# Patient Record
Sex: FEMALE | Race: BLACK OR AFRICAN AMERICAN | Employment: OTHER | URBAN - METROPOLITAN AREA
[De-identification: names, ages, dates, MRNs, and addresses within clinical notes are randomized per-mention and may not be internally consistent; named-entity substitution may affect disease eponyms.]

---

## 2019-08-17 ENCOUNTER — APPOINTMENT (OUTPATIENT)
Dept: GENERAL RADIOLOGY | Age: 84
End: 2019-08-17
Attending: EMERGENCY MEDICINE
Payer: MEDICARE

## 2019-08-17 ENCOUNTER — APPOINTMENT (OUTPATIENT)
Dept: CT IMAGING | Age: 84
End: 2019-08-17
Attending: EMERGENCY MEDICINE
Payer: MEDICARE

## 2019-08-17 ENCOUNTER — HOSPITAL ENCOUNTER (EMERGENCY)
Age: 84
Discharge: HOME | End: 2019-08-17
Attending: EMERGENCY MEDICINE
Payer: MEDICARE

## 2019-08-17 VITALS
SYSTOLIC BLOOD PRESSURE: 135 MMHG | DIASTOLIC BLOOD PRESSURE: 56 MMHG | OXYGEN SATURATION: 98 % | HEART RATE: 78 BPM | HEIGHT: 61 IN | TEMPERATURE: 97.9 F | WEIGHT: 145 LBS | BODY MASS INDEX: 27.38 KG/M2 | RESPIRATION RATE: 18 BRPM

## 2019-08-17 DIAGNOSIS — R56.9 SEIZURE (HCC): Primary | ICD-10-CM

## 2019-08-17 LAB
ALBUMIN SERPL-MCNC: 3.8 G/DL (ref 3.5–5)
ALBUMIN/GLOB SERPL: 1.1 {RATIO} (ref 1.1–2.2)
ALP SERPL-CCNC: 68 U/L (ref 45–117)
ALT SERPL-CCNC: 20 U/L (ref 12–78)
ANION GAP SERPL CALC-SCNC: 7 MMOL/L (ref 5–15)
APPEARANCE UR: CLEAR
AST SERPL-CCNC: 15 U/L (ref 15–37)
BACTERIA URNS QL MICRO: NEGATIVE /HPF
BASOPHILS # BLD: 0.1 K/UL (ref 0–0.1)
BASOPHILS NFR BLD: 1 % (ref 0–1)
BILIRUB SERPL-MCNC: 0.3 MG/DL (ref 0.2–1)
BILIRUB UR QL: NEGATIVE
BUN SERPL-MCNC: 18 MG/DL (ref 6–20)
BUN/CREAT SERPL: 13 (ref 12–20)
CALCIUM SERPL-MCNC: 9.5 MG/DL (ref 8.5–10.1)
CHLORIDE SERPL-SCNC: 112 MMOL/L (ref 97–108)
CO2 SERPL-SCNC: 23 MMOL/L (ref 21–32)
COLOR UR: ABNORMAL
CREAT SERPL-MCNC: 1.38 MG/DL (ref 0.55–1.02)
DIFFERENTIAL METHOD BLD: ABNORMAL
EOSINOPHIL # BLD: 0.1 K/UL (ref 0–0.4)
EOSINOPHIL NFR BLD: 2 % (ref 0–7)
EPITH CASTS URNS QL MICRO: ABNORMAL /LPF
ERYTHROCYTE [DISTWIDTH] IN BLOOD BY AUTOMATED COUNT: 14.7 % (ref 11.5–14.5)
GLOBULIN SER CALC-MCNC: 3.6 G/DL (ref 2–4)
GLUCOSE SERPL-MCNC: 98 MG/DL (ref 65–100)
GLUCOSE UR STRIP.AUTO-MCNC: NEGATIVE MG/DL
HCT VFR BLD AUTO: 40.2 % (ref 35–47)
HGB BLD-MCNC: 12.8 G/DL (ref 11.5–16)
HGB UR QL STRIP: NEGATIVE
HYALINE CASTS URNS QL MICRO: ABNORMAL /LPF (ref 0–5)
IMM GRANULOCYTES # BLD AUTO: 0 K/UL (ref 0–0.04)
IMM GRANULOCYTES NFR BLD AUTO: 0 % (ref 0–0.5)
KETONES UR QL STRIP.AUTO: NEGATIVE MG/DL
LEUKOCYTE ESTERASE UR QL STRIP.AUTO: ABNORMAL
LYMPHOCYTES # BLD: 1.6 K/UL (ref 0.8–3.5)
LYMPHOCYTES NFR BLD: 29 % (ref 12–49)
MCH RBC QN AUTO: 26 PG (ref 26–34)
MCHC RBC AUTO-ENTMCNC: 31.8 G/DL (ref 30–36.5)
MCV RBC AUTO: 81.5 FL (ref 80–99)
MONOCYTES # BLD: 0.7 K/UL (ref 0–1)
MONOCYTES NFR BLD: 13 % (ref 5–13)
NEUTS SEG # BLD: 3.1 K/UL (ref 1.8–8)
NEUTS SEG NFR BLD: 55 % (ref 32–75)
NITRITE UR QL STRIP.AUTO: NEGATIVE
NRBC # BLD: 0 K/UL (ref 0–0.01)
NRBC BLD-RTO: 0 PER 100 WBC
PH UR STRIP: 5.5 [PH] (ref 5–8)
PLATELET # BLD AUTO: 237 K/UL (ref 150–400)
PMV BLD AUTO: 10.8 FL (ref 8.9–12.9)
POTASSIUM SERPL-SCNC: 3.7 MMOL/L (ref 3.5–5.1)
PROT SERPL-MCNC: 7.4 G/DL (ref 6.4–8.2)
PROT UR STRIP-MCNC: NEGATIVE MG/DL
RBC # BLD AUTO: 4.93 M/UL (ref 3.8–5.2)
RBC #/AREA URNS HPF: ABNORMAL /HPF (ref 0–5)
SODIUM SERPL-SCNC: 142 MMOL/L (ref 136–145)
SP GR UR REFRACTOMETRY: 1.01 (ref 1–1.03)
UROBILINOGEN UR QL STRIP.AUTO: 0.2 EU/DL (ref 0.2–1)
WBC # BLD AUTO: 5.6 K/UL (ref 3.6–11)
WBC URNS QL MICRO: ABNORMAL /HPF (ref 0–4)

## 2019-08-17 PROCEDURE — 99285 EMERGENCY DEPT VISIT HI MDM: CPT

## 2019-08-17 PROCEDURE — 80053 COMPREHEN METABOLIC PANEL: CPT

## 2019-08-17 PROCEDURE — 81001 URINALYSIS AUTO W/SCOPE: CPT

## 2019-08-17 PROCEDURE — 71046 X-RAY EXAM CHEST 2 VIEWS: CPT

## 2019-08-17 PROCEDURE — 85025 COMPLETE CBC W/AUTO DIFF WBC: CPT

## 2019-08-17 PROCEDURE — 36415 COLL VENOUS BLD VENIPUNCTURE: CPT

## 2019-08-17 PROCEDURE — 70450 CT HEAD/BRAIN W/O DYE: CPT

## 2019-08-17 PROCEDURE — 93005 ELECTROCARDIOGRAM TRACING: CPT

## 2019-08-17 NOTE — ED NOTES
Care assumed of patient @ this time & intro with rounding done, with report from [facility], RN_________________. Patient resting quietly on stretcher without verbal complaints.

## 2019-08-17 NOTE — ED NOTES
Patient states she was at home on the computer playing a game where she got really dizzy, her son states her hand began shaking and her eyes rolled back in her head. Patient reports she has no history of seizure and that she does not remember the blacking out. She was never post ictal per EMS and remained A&O x4. Patient is in no distress at this time and is resting comfortably.

## 2019-08-17 NOTE — ED NOTES
Bedside shift change report given to Reyna Barnes RN (oncoming nurse) by Ariana Lynn RN (offgoing nurse). Report included the following information SBAR, Kardex and Recent Results.

## 2019-08-17 NOTE — ED PROVIDER NOTES
EMERGENCY DEPARTMENT HISTORY AND PHYSICAL EXAM      Date: 8/17/2019  Patient Name: Barry Soria    History of Presenting Illness     Chief Complaint   Patient presents with    Seizure       History Provided By: Patient and Patient's Son    HPI: Barry Soria, 80 y.o. female with PMHx significant for CAD and hypertension, presents by POV to the ED with cc of seizure-like activity. Symptoms occurred earlier today. Patient was at home playing a computer game, her son came into the room after she said she did not feel good and found her to be unresponsive with bilateral hand shaking. She was looking up to the ceiling, her eyes were tremulous and shaking back and forth. This occurred for approximately 5 minutes during which time she was unresponsive. No urinary incontinence but she did have some tongue biting. She had a 10-minute postictal period. No prior seizure activity. She does not drink alcohol. She only takes blood pressure medication. She denies any recent fevers, chills, illness. There are no other complaints, changes, or physical findings at this time. PCP: Maria Teresa Dan MD    No current facility-administered medications on file prior to encounter. Current Outpatient Medications on File Prior to Encounter   Medication Sig Dispense Refill    diltiazem (TAZTIA XT) 300 mg SR capsule Take 300 mg by mouth daily. Past History     Past Medical History:  Past Medical History:   Diagnosis Date    CAD (coronary artery disease)     Hypertension        Past Surgical History:  History reviewed. No pertinent surgical history. Family History:  History reviewed. No pertinent family history. Social History:  Social History     Tobacco Use    Smoking status: Never Smoker    Smokeless tobacco: Never Used   Substance Use Topics    Alcohol use: No    Drug use: No       Allergies:   Allergies   Allergen Reactions    Codeine Hives    Pcn [Penicillins] Hives    Sulfa (Sulfonamide Antibiotics) Hives         Review of Systems   Review of Systems   Constitutional: Negative for chills and fever. HENT: Negative. Eyes: Negative for visual disturbance. Respiratory: Negative for cough and shortness of breath. Cardiovascular: Negative for chest pain and leg swelling. Gastrointestinal: Negative for abdominal pain, nausea and vomiting. Genitourinary: Negative. Negative for dysuria. Musculoskeletal: Negative for back pain and gait problem. Skin: Negative for color change and rash. Neurological: Positive for seizures. Negative for dizziness, weakness, light-headedness and headaches. Hematological: Does not bruise/bleed easily. Physical Exam   Physical Exam   Constitutional: She is oriented to person, place, and time. She appears well-developed and well-nourished. No distress. HENT:   Head: Normocephalic and atraumatic. Eyes: Pupils are equal, round, and reactive to light. EOM are normal.   Neck: Normal range of motion. Neck supple. No JVD present. Cardiovascular: Normal rate, regular rhythm and normal heart sounds. Exam reveals no friction rub. No murmur heard. Pulmonary/Chest: Effort normal and breath sounds normal. No respiratory distress. She has no wheezes. Abdominal: Soft. She exhibits no distension. There is no tenderness. There is no rebound and no guarding. Musculoskeletal: Normal range of motion. She exhibits no edema or deformity. Neurological: She is alert and oriented to person, place, and time. No cranial nerve deficit. Cranial nerves intact, motor 5 out of 5 throughout, sensation intact, no cerebellar deficits. Skin: Skin is warm and dry. No rash noted. She is not diaphoretic. No erythema.        Diagnostic Study Results     Labs -     Recent Results (from the past 12 hour(s))   CBC WITH AUTOMATED DIFF    Collection Time: 08/17/19  6:16 PM   Result Value Ref Range    WBC 5.6 3.6 - 11.0 K/uL    RBC 4.93 3.80 - 5.20 M/uL    HGB 12.8 11.5 - 16.0 g/dL    HCT 40.2 35.0 - 47.0 %    MCV 81.5 80.0 - 99.0 FL    MCH 26.0 26.0 - 34.0 PG    MCHC 31.8 30.0 - 36.5 g/dL    RDW 14.7 (H) 11.5 - 14.5 %    PLATELET 823 633 - 291 K/uL    MPV 10.8 8.9 - 12.9 FL    NRBC 0.0 0  WBC    ABSOLUTE NRBC 0.00 0.00 - 0.01 K/uL    NEUTROPHILS 55 32 - 75 %    LYMPHOCYTES 29 12 - 49 %    MONOCYTES 13 5 - 13 %    EOSINOPHILS 2 0 - 7 %    BASOPHILS 1 0 - 1 %    IMMATURE GRANULOCYTES 0 0.0 - 0.5 %    ABS. NEUTROPHILS 3.1 1.8 - 8.0 K/UL    ABS. LYMPHOCYTES 1.6 0.8 - 3.5 K/UL    ABS. MONOCYTES 0.7 0.0 - 1.0 K/UL    ABS. EOSINOPHILS 0.1 0.0 - 0.4 K/UL    ABS. BASOPHILS 0.1 0.0 - 0.1 K/UL    ABS. IMM. GRANS. 0.0 0.00 - 0.04 K/UL    DF AUTOMATED     METABOLIC PANEL, COMPREHENSIVE    Collection Time: 08/17/19  6:16 PM   Result Value Ref Range    Sodium 142 136 - 145 mmol/L    Potassium 3.7 3.5 - 5.1 mmol/L    Chloride 112 (H) 97 - 108 mmol/L    CO2 23 21 - 32 mmol/L    Anion gap 7 5 - 15 mmol/L    Glucose 98 65 - 100 mg/dL    BUN 18 6 - 20 MG/DL    Creatinine 1.38 (H) 0.55 - 1.02 MG/DL    BUN/Creatinine ratio 13 12 - 20      GFR est AA 44 (L) >60 ml/min/1.73m2    GFR est non-AA 36 (L) >60 ml/min/1.73m2    Calcium 9.5 8.5 - 10.1 MG/DL    Bilirubin, total 0.3 0.2 - 1.0 MG/DL    ALT (SGPT) 20 12 - 78 U/L    AST (SGOT) 15 15 - 37 U/L    Alk.  phosphatase 68 45 - 117 U/L    Protein, total 7.4 6.4 - 8.2 g/dL    Albumin 3.8 3.5 - 5.0 g/dL    Globulin 3.6 2.0 - 4.0 g/dL    A-G Ratio 1.1 1.1 - 2.2     EKG, 12 LEAD, INITIAL    Collection Time: 08/17/19  6:42 PM   Result Value Ref Range    Ventricular Rate 71 BPM    Atrial Rate 72 BPM    P-R Interval 100 ms    QRS Duration 86 ms    Q-T Interval 368 ms    QTC Calculation (Bezet) 399 ms    Calculated P Axis 7 degrees    Calculated R Axis -11 degrees    Calculated T Axis 88 degrees    Diagnosis       Sinus rhythm with short RI  Nonspecific T wave abnormality  No previous ECGs available     URINALYSIS W/ RFLX MICROSCOPIC Collection Time: 08/17/19  7:34 PM   Result Value Ref Range    Color YELLOW/STRAW      Appearance CLEAR CLEAR      Specific gravity 1.012 1.003 - 1.030      pH (UA) 5.5 5.0 - 8.0      Protein NEGATIVE  NEG mg/dL    Glucose NEGATIVE  NEG mg/dL    Ketone NEGATIVE  NEG mg/dL    Bilirubin NEGATIVE  NEG      Blood NEGATIVE  NEG      Urobilinogen 0.2 0.2 - 1.0 EU/dL    Nitrites NEGATIVE  NEG      Leukocyte Esterase SMALL (A) NEG      WBC 10-20 0 - 4 /hpf    RBC 0-5 0 - 5 /hpf    Epithelial cells FEW FEW /lpf    Bacteria NEGATIVE  NEG /hpf    Hyaline cast 2-5 0 - 5 /lpf       Radiologic Studies -   CT HEAD WO CONT   Final Result   IMPRESSION: No acute process            XR CHEST PA LAT   Final Result   IMPRESSION: No acute abnormality identified. CT Results  (Last 48 hours)               08/17/19 2121  CT HEAD WO CONT Final result    Impression:  IMPRESSION: No acute process               Narrative:  EXAM: CT HEAD WO CONT       INDICATION: new onset seizure       COMPARISON: None. CONTRAST: None. TECHNIQUE: Unenhanced CT of the head was performed using 5 mm images. Brain and   bone windows were generated. CT dose reduction was achieved through use of a   standardized protocol tailored for this examination and automatic exposure   control for dose modulation. FINDINGS:   There is mild prominence of the ventricles and sulci. There are slight changes   small vessel disease periventricular white matter. No hemorrhage mass or acute   infarction identified. Bony structures are intact. CXR Results  (Last 48 hours)               08/17/19 1900  XR CHEST PA LAT Final result    Impression:  IMPRESSION: No acute abnormality identified. Narrative:  EXAM:  XR CHEST PA LAT       INDICATION:   seizure       COMPARISON: None. FINDINGS: PA and lateral radiographs of the chest demonstrate lungs are clear of   an acute process.  There is minor atelectasis/scarring left base. . The cardiac   and mediastinal contours and pulmonary vascularity are normal.  Bony structures   appear intact. There is eventration right diaphragm anteriorly. .                    Medical Decision Making   I am the first provider for this patient. I reviewed the vital signs, available nursing notes, past medical history, past surgical history, family history and social history. Vital Signs-Reviewed the patient's vital signs. Patient Vitals for the past 12 hrs:   Temp Pulse Resp BP SpO2   08/17/19 2158     98 %   08/17/19 2156    135/56    08/17/19 2030    126/52 100 %   08/17/19 2000    120/51 97 %   08/17/19 1930    117/65 100 %   08/17/19 1914    137/58 98 %   08/17/19 1830    136/64 97 %   08/17/19 1819    138/77 97 %   08/17/19 1800    136/63 96 %   08/17/19 1742    131/70    08/17/19 1741 97.9 °F (36.6 °C) 78 18 131/70 96 %       Pulse Oximetry Analysis - 98% on room air    Cardiac Monitor:   Rate: 78 bpm  Rhythm: Normal Sinus Rhythm        Records Reviewed: Nursing Notes, Old Medical Records, Previous Radiology Studies and Previous Laboratory Studies    Provider Notes (Medical Decision Making): This is an 59-year-old female here with new onset seizure activity. On examination she is in no acute distress. She appears clinically well nontoxic. She is afebrile and vital signs stable throughout entire ED stay. It seems that symptoms were unprovoked however she was playing a computer game prior to symptoms occurring. According to patient's son who was witnessed, it does appear that this was in fact seizure-like activity however she does not have any urinary incontinence or tongue biting. Urinalysis negative for infection. Blood work does not demonstrate any significant leukocytosis, concern for infection, or electrolyte imbalance. Chest x-ray is clear.   I did obtain CT imaging of the head which demonstrates no acute intracranial pathology. Patient was observed in the ED for approximately 5 hours during which time she had no recurrence of seizure activity. She feels back to baseline and would like to be discharged home at this time. I encouraged that she follow-up with PCP. I do not believe that she needs to be started on antiepileptic medication. With a negative CT head believe she is stable for discharge with strict return ED precautions. Son is agreeable to this plan and will be checking on his mother frequently. Discharged home in stable condition. ED Course:   Initial assessment performed. The patients presenting problems have been discussed, and they are in agreement with the care plan formulated and outlined with them. I have encouraged them to ask questions as they arise throughout their visit. ED Course as of Aug 18 0008   Sat Aug 17, 2019   1933 EKG per my interpretation EKG per my interpretation normal sinus rhythm, rate 71 bpm, normal axis, nonspecific ST-T wave changes, no acute ischemic changes. [AK]      ED Course User Index  [AK] Geremias Grajeda MD       Critical Care Time:   0    Disposition:  Home with PCP follow up    PLAN:  1. Discharge Medication List as of 8/17/2019 10:08 PM        2. Follow-up Information     Follow up With Specialties Details Why Contact Info    Snehal Baron MD [facility] Practice Call  As needed, If symptoms worsen [facility]  [phone]          Return to ED if worse     Diagnosis     Clinical Impression:   1.  Seizure [facility])        Attestations:    Sveta Naik MD

## 2019-08-18 LAB
ATRIAL RATE: 72 BPM
CALCULATED P AXIS, ECG09: 7 DEGREES
CALCULATED R AXIS, ECG10: -11 DEGREES
CALCULATED T AXIS, ECG11: 88 DEGREES
DIAGNOSIS, 93000: NORMAL
P-R INTERVAL, ECG05: 100 MS
Q-T INTERVAL, ECG07: 368 MS
QRS DURATION, ECG06: 86 MS
QTC CALCULATION (BEZET), ECG08: 399 MS
VENTRICULAR RATE, ECG03: 71 BPM

## 2019-08-18 NOTE — ED NOTES
Dr Ferguson_Jenna______________________ in to talk with patient and explain plan of care with  understanding and  written & verbal instructions. IV access removed from left forearm after put in by EMS.  Son will drive patient home & to car in w/c

## 2019-08-18 NOTE — DISCHARGE INSTRUCTIONS
You were evaluated in the emergency department for seizure activity. Your examination was reassuring as was your work-up including blood work, CT scan of the head, EKG, and chest xray. It will be important for you to follow-up with your primary care physician in 2-3 days. If you develop worsening symptoms such as recurrent seizures, please return to the emergency department immediately.

## 2020-12-26 ENCOUNTER — HOSPITAL ENCOUNTER (EMERGENCY)
Age: 85
Discharge: HOME | End: 2020-12-26
Attending: STUDENT IN AN ORGANIZED HEALTH CARE EDUCATION/TRAINING PROGRAM
Payer: MEDICARE

## 2020-12-26 ENCOUNTER — APPOINTMENT (OUTPATIENT)
Dept: GENERAL RADIOLOGY | Age: 85
End: 2020-12-26
Attending: STUDENT IN AN ORGANIZED HEALTH CARE EDUCATION/TRAINING PROGRAM
Payer: MEDICARE

## 2020-12-26 VITALS
RESPIRATION RATE: 15 BRPM | DIASTOLIC BLOOD PRESSURE: 79 MMHG | WEIGHT: 145.06 LBS | SYSTOLIC BLOOD PRESSURE: 138 MMHG | TEMPERATURE: 97.7 F | HEART RATE: 72 BPM | OXYGEN SATURATION: 98 % | HEIGHT: 61 IN | BODY MASS INDEX: 27.39 KG/M2

## 2020-12-26 DIAGNOSIS — R42 LIGHTHEADEDNESS: Primary | ICD-10-CM

## 2020-12-26 DIAGNOSIS — E86.1 HYPOVOLEMIA: ICD-10-CM

## 2020-12-26 LAB
ALBUMIN SERPL-MCNC: 3.4 G/DL (ref 3.5–5)
ALBUMIN/GLOB SERPL: 0.9 {RATIO} (ref 1.1–2.2)
ALP SERPL-CCNC: 53 U/L (ref 45–117)
ALT SERPL-CCNC: 28 U/L (ref 12–78)
ANION GAP SERPL CALC-SCNC: 6 MMOL/L (ref 5–15)
APPEARANCE UR: CLEAR
AST SERPL-CCNC: 29 U/L (ref 15–37)
BACTERIA URNS QL MICRO: NEGATIVE /HPF
BASOPHILS # BLD: 0 K/UL (ref 0–0.1)
BASOPHILS NFR BLD: 1 % (ref 0–1)
BILIRUB SERPL-MCNC: 0.5 MG/DL (ref 0.2–1)
BILIRUB UR QL: NEGATIVE
BNP SERPL-MCNC: 310 PG/ML
BUN SERPL-MCNC: 16 MG/DL (ref 6–20)
BUN/CREAT SERPL: 11 (ref 12–20)
CALCIUM SERPL-MCNC: 9 MG/DL (ref 8.5–10.1)
CHLORIDE SERPL-SCNC: 112 MMOL/L (ref 97–108)
CO2 SERPL-SCNC: 23 MMOL/L (ref 21–32)
COLOR UR: ABNORMAL
COMMENT, HOLDF: NORMAL
CREAT SERPL-MCNC: 1.41 MG/DL (ref 0.55–1.02)
DIFFERENTIAL METHOD BLD: ABNORMAL
EOSINOPHIL # BLD: 0 K/UL (ref 0–0.4)
EOSINOPHIL NFR BLD: 0 % (ref 0–7)
EPITH CASTS URNS QL MICRO: ABNORMAL /LPF
ERYTHROCYTE [DISTWIDTH] IN BLOOD BY AUTOMATED COUNT: 14.5 % (ref 11.5–14.5)
GLOBULIN SER CALC-MCNC: 3.8 G/DL (ref 2–4)
GLUCOSE SERPL-MCNC: 91 MG/DL (ref 65–100)
GLUCOSE UR STRIP.AUTO-MCNC: NEGATIVE MG/DL
HCT VFR BLD AUTO: 40.1 % (ref 35–47)
HGB BLD-MCNC: 12.8 G/DL (ref 11.5–16)
HGB UR QL STRIP: NEGATIVE
HYALINE CASTS URNS QL MICRO: ABNORMAL /LPF (ref 0–5)
IMM GRANULOCYTES # BLD AUTO: 0 K/UL (ref 0–0.04)
IMM GRANULOCYTES NFR BLD AUTO: 1 % (ref 0–0.5)
KETONES UR QL STRIP.AUTO: NEGATIVE MG/DL
LACTATE BLD-SCNC: 1.67 MMOL/L (ref 0.4–2)
LEUKOCYTE ESTERASE UR QL STRIP.AUTO: ABNORMAL
LIPASE SERPL-CCNC: 202 U/L (ref 73–393)
LYMPHOCYTES # BLD: 1.7 K/UL (ref 0.8–3.5)
LYMPHOCYTES NFR BLD: 38 % (ref 12–49)
MAGNESIUM SERPL-MCNC: 2 MG/DL (ref 1.6–2.4)
MCH RBC QN AUTO: 25.1 PG (ref 26–34)
MCHC RBC AUTO-ENTMCNC: 31.9 G/DL (ref 30–36.5)
MCV RBC AUTO: 78.6 FL (ref 80–99)
MONOCYTES # BLD: 0.6 K/UL (ref 0–1)
MONOCYTES NFR BLD: 13 % (ref 5–13)
NEUTS SEG # BLD: 2.1 K/UL (ref 1.8–8)
NEUTS SEG NFR BLD: 47 % (ref 32–75)
NITRITE UR QL STRIP.AUTO: NEGATIVE
NRBC # BLD: 0 K/UL (ref 0–0.01)
NRBC BLD-RTO: 0 PER 100 WBC
PH UR STRIP: 6 [PH] (ref 5–8)
PLATELET # BLD AUTO: 290 K/UL (ref 150–400)
PMV BLD AUTO: 10.5 FL (ref 8.9–12.9)
POTASSIUM SERPL-SCNC: 4 MMOL/L (ref 3.5–5.1)
PROT SERPL-MCNC: 7.2 G/DL (ref 6.4–8.2)
PROT UR STRIP-MCNC: NEGATIVE MG/DL
RBC # BLD AUTO: 5.1 M/UL (ref 3.8–5.2)
RBC #/AREA URNS HPF: ABNORMAL /HPF (ref 0–5)
SAMPLES BEING HELD,HOLD: NORMAL
SODIUM SERPL-SCNC: 141 MMOL/L (ref 136–145)
SP GR UR REFRACTOMETRY: 1.01 (ref 1–1.03)
TROPONIN I SERPL-MCNC: <0.05 NG/ML
UROBILINOGEN UR QL STRIP.AUTO: 1 EU/DL (ref 0.2–1)
WBC # BLD AUTO: 4.4 K/UL (ref 3.6–11)
WBC URNS QL MICRO: ABNORMAL /HPF (ref 0–4)

## 2020-12-26 PROCEDURE — 74011250636 HC RX REV CODE- 250/636: Performed by: STUDENT IN AN ORGANIZED HEALTH CARE EDUCATION/TRAINING PROGRAM

## 2020-12-26 PROCEDURE — 36415 COLL VENOUS BLD VENIPUNCTURE: CPT

## 2020-12-26 PROCEDURE — 85025 COMPLETE CBC W/AUTO DIFF WBC: CPT

## 2020-12-26 PROCEDURE — 84484 ASSAY OF TROPONIN QUANT: CPT

## 2020-12-26 PROCEDURE — 83690 ASSAY OF LIPASE: CPT

## 2020-12-26 PROCEDURE — 99285 EMERGENCY DEPT VISIT HI MDM: CPT

## 2020-12-26 PROCEDURE — 81001 URINALYSIS AUTO W/SCOPE: CPT

## 2020-12-26 PROCEDURE — 96361 HYDRATE IV INFUSION ADD-ON: CPT

## 2020-12-26 PROCEDURE — 80053 COMPREHEN METABOLIC PANEL: CPT

## 2020-12-26 PROCEDURE — 83605 ASSAY OF LACTIC ACID: CPT

## 2020-12-26 PROCEDURE — 83735 ASSAY OF MAGNESIUM: CPT

## 2020-12-26 PROCEDURE — 71045 X-RAY EXAM CHEST 1 VIEW: CPT

## 2020-12-26 PROCEDURE — 96360 HYDRATION IV INFUSION INIT: CPT

## 2020-12-26 PROCEDURE — 83880 ASSAY OF NATRIURETIC PEPTIDE: CPT

## 2020-12-26 PROCEDURE — 93005 ELECTROCARDIOGRAM TRACING: CPT

## 2020-12-26 RX ORDER — LOSARTAN POTASSIUM 25 MG/1
25 TABLET ORAL DAILY
Qty: 30 TAB | Refills: 0 | Status: SHIPPED | OUTPATIENT
Start: 2020-12-26 | End: 2021-01-07

## 2020-12-26 RX ADMIN — SODIUM CHLORIDE 1000 ML: 9 INJECTION, SOLUTION INTRAVENOUS at 12:25

## 2020-12-26 NOTE — ED NOTES
Attempted to call son at [phone], [phone], [phone] - all numbers not in service or accepting calls. Pt states there is no one else that can come pick her up.

## 2020-12-26 NOTE — ED NOTES
Pt was able to void via bedside commode, sample was sent to the lab. She was also able to walk to the door and back to the stretcher with a walker. Pt placed in position of comfort with call bell within reach.

## 2020-12-26 NOTE — PROGRESS NOTES
Hospitalist Consultation Note    NAME:  Jonatan Setting   :   1934   MRN:   [id number]     ATTENDING: No admitting provider for patient encounter. PCP:  Brittni Mujica MD    Date/Time:  2020 4:06 PM      Recommendations/Plan:       Active Problems:    * No active hospital problems. *  Dizziness, secondary to hypotension - resolved  Pt endorses positional lightheadedness for the past few weeks. She states that she told her PCP, who then increased her Losartan from 25mg to 50mg. She states that she been taking this diligently. Pt was given 1L of IVF in the ER and her BP has now rebounded. She was walked by RN using a walker and the pt did well. Pt denies any dizziness during this time. Pt denies any CP or SOB during these episodes in the past few weeks. She also denies any syncope. Advised pt to decrease her Losartan back to 25mg   Advised pt to stay hydrated  Advised to use compression stockings  Advised to continue using her walker at home  Do not believe that this is central or cardiac in origin  Trops negative. EKG reviewed - no ischemic changes appreciated. PE is non focal.  Have called pt's Emergency Contact but the person that picked up said it was the wrong number  Pt to be discharged from the ER - have spoken to ER Attending. Code Status: Full          Subjective:   REQUESTING PHYSICIAN:  REASON FOR CONSULT: for admission  [facility] is a 80 y.o.  female who I was asked to see for reason listed above. As outlined above, pt's dizziness is likely related to too tight BP control as well as decreased fluid intake. Pt now feels better since having received IVF and denies any further dizziness when she was ambulated. Pt is eager and happy to be discharged home. Past Medical History:   Diagnosis Date    CAD (coronary artery disease)     Hypertension       No past surgical history on file.   Social History     Tobacco Use    Smoking status: Never Smoker    Smokeless tobacco: Never Used   Substance Use Topics    Alcohol use: No      No family history on file. Allergies   Allergen Reactions    Codeine Hives    Pcn [Penicillins] Hives    Sulfa (Sulfonamide Antibiotics) Hives      Prior to Admission medications    Medication Sig Start Date End Date Taking? Authorizing Provider   diltiazem (TAZTIA XT) 300 mg SR capsule Take 300 mg by mouth daily. Kiesha, MD Jeanine       REVIEW OF SYSTEMS:     Total of 12 systems reviewed as follows:   I am not able to complete the review of systems because:    The patient is intubated and sedated    The patient has altered mental status due to his acute medical problems    The patient has baseline aphasia from prior stroke(s)    The patient has baseline dementia and is not reliable historian                 POSITIVE= underlined text  Negative = text not underlined  General:  fever, chills, sweats, generalized weakness, weight loss/gain,      loss of appetite   Eyes:    blurred vision, eye pain, loss of vision, double vision  ENT:    rhinorrhea, pharyngitis   Respiratory:   cough, sputum production, SOB, wheezing, AGUILERA, pleuritic pain   Cardiology:   chest pain, palpitations, orthopnea, PND, edema, syncope   Gastrointestinal:  abdominal pain , N/V, dysphagia, diarrhea, constipation, bleeding   Genitourinary:  frequency, urgency, dysuria, hematuria, incontinence   Muskuloskeletal :  arthralgia, myalgia   Hematology:  easy bruising, nose or gum bleeding, lymphadenopathy   Dermatological: rash, ulceration, pruritis   Endocrine:   hot flashes or polydipsia   Neurological:  headache, dizziness, confusion, focal weakness, paresthesia,     Speech difficulties, memory loss, gait disturbance  Psychological: Feelings of anxiety, depression, agitation    Objective:   VITALS:    Visit Vitals  BP (!) 143/81 (BP Patient Position: Supine)   Pulse 71   Temp 97.7 °F (36.5 °C)   Resp 14   Ht 5' 1\" (1.549 m)   Wt 65.8 kg (145 lb 1 oz)   SpO2 95%   BMI 27.41 kg/m²     Temp (24hrs), Av.7 °F (36.5 °C), Min:97.7 °F (36.5 °C), Max:97.7 °F (36.5 °C)      PHYSICAL EXAM:   General:    Alert, cooperative, no distress, appears stated age. HEENT: Atraumatic, anicteric sclerae, pink conjunctivae     No oral ulcers, mucosa moist, throat clear  Neck:  Supple, symmetrical,  thyroid: non tender  Lungs:   Clear to auscultation bilaterally. No Wheezing or Rhonchi. No rales. Chest wall:  No tenderness  No Accessory muscle use. Heart:   Regular  rhythm,  No  murmur   No edema  Abdomen:   Soft, non-tender. Not distended. Bowel sounds normal  Extremities: No cyanosis. No clubbing  Skin:     Not pale. Not Jaundiced  No rashes   Psych:  Good insight. Not depressed. Not anxious or agitated. Neurologic: EOMs intact. No facial asymmetry. No aphasia or slurred speech. Symmetrical strength, Alert and oriented X 4.     _______________________________________________________________________  Care Plan discussed with:    Comments   Patient x    Family      RN x    Care Manager                    Consultant:      ____________________________________________________________________  TOTAL TIME:     45 mins    Comments    x Reviewed previous records   >50% of visit spent in counseling and coordination of care  Discussion with patient and/or family and questions answered       Critical Care Provided     Minutes non procedure based  ________________________________________________________________________  Signed: Willy Garza MD      Procedures: see electronic medical records for all procedures/Xrays and details which were not copied into this note but were reviewed prior to creation of Plan.     LAB DATA REVIEWED:    Recent Results (from the past 24 hour(s))   SAMPLES BEING HELD    Collection Time: 20 12:27 PM   Result Value Ref Range    SAMPLES BEING HELD BL     COMMENT        Add-on orders for these samples will be processed based on acceptable specimen integrity and analyte stability, which may vary by analyte. CBC WITH AUTOMATED DIFF    Collection Time: 12/26/20 12:27 PM   Result Value Ref Range    WBC 4.4 3.6 - 11.0 K/uL    RBC 5.10 3.80 - 5.20 M/uL    HGB 12.8 11.5 - 16.0 g/dL    HCT 40.1 35.0 - 47.0 %    MCV 78.6 (L) 80.0 - 99.0 FL    MCH 25.1 (L) 26.0 - 34.0 PG    MCHC 31.9 30.0 - 36.5 g/dL    RDW 14.5 11.5 - 14.5 %    PLATELET 682 425 - 044 K/uL    MPV 10.5 8.9 - 12.9 FL    NRBC 0.0 0  WBC    ABSOLUTE NRBC 0.00 0.00 - 0.01 K/uL    NEUTROPHILS 47 32 - 75 %    LYMPHOCYTES 38 12 - 49 %    MONOCYTES 13 5 - 13 %    EOSINOPHILS 0 0 - 7 %    BASOPHILS 1 0 - 1 %    IMMATURE GRANULOCYTES 1 (H) 0.0 - 0.5 %    ABS. NEUTROPHILS 2.1 1.8 - 8.0 K/UL    ABS. LYMPHOCYTES 1.7 0.8 - 3.5 K/UL    ABS. MONOCYTES 0.6 0.0 - 1.0 K/UL    ABS. EOSINOPHILS 0.0 0.0 - 0.4 K/UL    ABS. BASOPHILS 0.0 0.0 - 0.1 K/UL    ABS. IMM. GRANS. 0.0 0.00 - 0.04 K/UL    DF AUTOMATED     NT-PRO BNP    Collection Time: 12/26/20  1:06 PM   Result Value Ref Range    NT pro- <450 PG/ML   TROPONIN I    Collection Time: 12/26/20  1:06 PM   Result Value Ref Range    Troponin-I, Qt. <0.05 <0.05 ng/mL   LIPASE    Collection Time: 12/26/20  1:06 PM   Result Value Ref Range    Lipase 202 73 - 393 U/L   MAGNESIUM    Collection Time: 12/26/20  1:06 PM   Result Value Ref Range    Magnesium 2.0 1.6 - 2.4 mg/dL   METABOLIC PANEL, COMPREHENSIVE    Collection Time: 12/26/20  1:06 PM   Result Value Ref Range    Sodium 141 136 - 145 mmol/L    Potassium 4.0 3.5 - 5.1 mmol/L    Chloride 112 (H) 97 - 108 mmol/L    CO2 23 21 - 32 mmol/L    Anion gap 6 5 - 15 mmol/L    Glucose 91 65 - 100 mg/dL    BUN 16 6 - 20 MG/DL    Creatinine 1.41 (H) 0.55 - 1.02 MG/DL    BUN/Creatinine ratio 11 (L) 12 - 20      GFR est AA 43 (L) >60 ml/min/1.73m2    GFR est non-AA 35 (L) >60 ml/min/1.73m2    Calcium 9.0 8.5 - 10.1 MG/DL    Bilirubin, total 0.5 0.2 - 1.0 MG/DL    ALT (SGPT) 28 12 - 78 U/L    AST (SGOT) 29 15 - 37 U/L    Alk. phosphatase 53 45 - 117 U/L    Protein, total 7.2 6.4 - 8.2 g/dL    Albumin 3.4 (L) 3.5 - 5.0 g/dL    Globulin 3.8 2.0 - 4.0 g/dL    A-G Ratio 0.9 (L) 1.1 - 2.2     POC LACTIC ACID    Collection Time: 12/26/20  1:15 PM   Result Value Ref Range    Lactic Acid (POC) 1.67 0.40 - 2.00 mmol/L   URINALYSIS W/ RFLX MICROSCOPIC    Collection Time: 12/26/20  3:21 PM   Result Value Ref Range    Color YELLOW/STRAW      Appearance CLEAR CLEAR      Specific gravity 1.013 1.003 - 1.030      pH (UA) 6.0 5.0 - 8.0      Protein Negative NEG mg/dL    Glucose Negative NEG mg/dL    Ketone Negative NEG mg/dL    Bilirubin Negative NEG      Blood Negative NEG      Urobilinogen 1.0 0.2 - 1.0 EU/dL    Nitrites Negative NEG      Leukocyte Esterase TRACE (A) NEG      WBC 0-4 0 - 4 /hpf    RBC 0-5 0 - 5 /hpf    Epithelial cells FEW FEW /lpf    Bacteria Negative NEG /hpf    Hyaline cast 2-5 0 - 5 /lpf       _____________________________  Hospitalist: Memo Patel MD

## 2020-12-26 NOTE — ED PROVIDER NOTES
EMERGENCY DEPARTMENT HISTORY AND PHYSICAL EXAM      Date: 12/26/2020  Patient Name: Kendrick Condon    History of Presenting Illness     Chief Complaint   Patient presents with    Dizziness     arrives via rescue weakness for two weeks. pt feels dizzy. bp low in triage 73/38 says she felt faint    Fatigue         HPI: Kendrick Condon, 80 y.o. female presents to the ED with cc of generalized weakness. This has been progressing over the last several weeks. History is also obtained from her son. She states she has not been eating much, he states that she has been drinking but has not eaten barely any food in the last several weeks. She states that every time she stands up she gets very lightheaded and feels like she is about to pass out. Apparently she has been barely moving from her bed at home. She denies any chest pain or shortness of breath, no fevers or coughing. She denies abdominal pain, vomiting or diarrhea, no headaches, no dysuria or hematuria. No new medications. According to her son, she has no known cardiac history but has not been to the doctor in years. There are no other complaints, changes, or physical findings at this time. PCP: Clara Cardona MD    No current facility-administered medications on file prior to encounter. Current Outpatient Medications on File Prior to Encounter   Medication Sig Dispense Refill    diltiazem (TAZTIA XT) 300 mg SR capsule Take 300 mg by mouth daily. Past History     Past Medical History:  Past Medical History:   Diagnosis Date    CAD (coronary artery disease)     Hypertension        Past Surgical History:  No past surgical history on file. Family History:  No family history on file. Social History:  Social History     Tobacco Use    Smoking status: Never Smoker    Smokeless tobacco: Never Used   Substance Use Topics    Alcohol use: No    Drug use: No       Allergies:   Allergies   Allergen Reactions    Codeine Hives    Pcn [Penicillins] Hives    Sulfa (Sulfonamide Antibiotics) Hives         Review of Systems   no fever  No eye pain  No ear pain  no shortness of breath  no chest pain  no abdominal pain  no dysuria  no leg pain  No rash  No lymphadenopathy  No weight loss    Physical Exam   Physical Exam  Constitutional:       General: She is not in acute distress. HENT:      Head: Normocephalic and atraumatic. Nose: Nose normal.   Eyes:      Extraocular Movements: Extraocular movements intact. Neck:      Musculoskeletal: Neck supple. Cardiovascular:      Rate and Rhythm: Normal rate and regular rhythm. Pulmonary:      Effort: Pulmonary effort is normal.      Breath sounds: Normal breath sounds. Abdominal:      Palpations: Abdomen is soft. Tenderness: There is no abdominal tenderness. Musculoskeletal:         General: No swelling or tenderness. Skin:     General: Skin is warm and dry. Neurological:      General: No focal deficit present. Mental Status: She is alert and oriented to person, place, and time. Comments: Normal straight arm raise bilaterally, sensation to light touch intact over upper and lower extremities bilaterally, she is weak in both of her lower extremities, she reports chronically so, speech is clear and coherent   Psychiatric:         Mood and Affect: Mood normal.         Diagnostic Study Results     Labs -     Recent Results (from the past 24 hour(s))   SAMPLES BEING HELD    Collection Time: 12/26/20 12:27 PM   Result Value Ref Range    SAMPLES BEING HELD BL     COMMENT        Add-on orders for these samples will be processed based on acceptable specimen integrity and analyte stability, which may vary by analyte.    CBC WITH AUTOMATED DIFF    Collection Time: 12/26/20 12:27 PM   Result Value Ref Range    WBC 4.4 3.6 - 11.0 K/uL    RBC 5.10 3.80 - 5.20 M/uL    HGB 12.8 11.5 - 16.0 g/dL    HCT 40.1 35.0 - 47.0 %    MCV 78.6 (L) 80.0 - 99.0 FL    MCH 25.1 (L) 26.0 - 34.0 PG    MCHC 31.9 30.0 - 36.5 g/dL    RDW 14.5 11.5 - 14.5 %    PLATELET 612 996 - 713 K/uL    MPV 10.5 8.9 - 12.9 FL    NRBC 0.0 0  WBC    ABSOLUTE NRBC 0.00 0.00 - 0.01 K/uL    NEUTROPHILS 47 32 - 75 %    LYMPHOCYTES 38 12 - 49 %    MONOCYTES 13 5 - 13 %    EOSINOPHILS 0 0 - 7 %    BASOPHILS 1 0 - 1 %    IMMATURE GRANULOCYTES 1 (H) 0.0 - 0.5 %    ABS. NEUTROPHILS 2.1 1.8 - 8.0 K/UL    ABS. LYMPHOCYTES 1.7 0.8 - 3.5 K/UL    ABS. MONOCYTES 0.6 0.0 - 1.0 K/UL    ABS. EOSINOPHILS 0.0 0.0 - 0.4 K/UL    ABS. BASOPHILS 0.0 0.0 - 0.1 K/UL    ABS. IMM. GRANS. 0.0 0.00 - 0.04 K/UL    DF AUTOMATED     NT-PRO BNP    Collection Time: 12/26/20  1:06 PM   Result Value Ref Range    NT pro- <450 PG/ML   TROPONIN I    Collection Time: 12/26/20  1:06 PM   Result Value Ref Range    Troponin-I, Qt. <0.05 <0.05 ng/mL   LIPASE    Collection Time: 12/26/20  1:06 PM   Result Value Ref Range    Lipase 202 73 - 393 U/L   MAGNESIUM    Collection Time: 12/26/20  1:06 PM   Result Value Ref Range    Magnesium 2.0 1.6 - 2.4 mg/dL   POC LACTIC ACID    Collection Time: 12/26/20  1:15 PM   Result Value Ref Range    Lactic Acid (POC) 1.67 0.40 - 2.00 mmol/L       Radiologic Studies -   XR CHEST PORT    (Results Pending)     CT Results  (Last 48 hours)    None        CXR Results  (Last 48 hours)    None            Medical Decision Making   I am the first provider for this patient. I reviewed the vital signs, available nursing notes, past medical history, past surgical history, family history and social history. Vital Signs-Reviewed the patient's vital signs.   Patient Vitals for the past 24 hrs:   Temp Pulse Resp BP SpO2   12/26/20 1404  71  131/73 95 %   12/26/20 1349     98 %   12/26/20 1315  67 14 119/67    12/26/20 1230  69 19 133/68 94 %   12/26/20 1220  74 18 118/69 96 %   12/26/20 1211 97.7 °F (36.5 °C) 83 16 (!) 73/38 97 %         Provider Notes (Medical Decision Making):   80-year-old female presenting with lightheadedness and weakness. On presentation, she is hypotensive at 76s over 30s, this improves after fluid administration. Her symptoms are concerning for dehydration, failure to thrive, electrolyte/metabolic abnormalities, dysrhythmias, UTI, pneumonia. Neurologic exam is unremarkable, denies any focal weakness, symptoms are not concerning for acute stroke or intracranial emergency. EKG is performed at 12: 33, shows either junctional or sinus rhythm at a rate of 69, , QRS 84, QTc of 428, P waves are difficult to discern however the rate appears regular, axis is upright, no ST segment elevation or depression concerning for ACS, there are T wave inversions in lateral leads. Is interpreted as regular sinus versus junctional rhythm. ED Course:     Initial assessment performed. The patients presenting problems have been discussed, and they are in agreement with the care plan formulated and outlined with them. I have encouraged them to ask questions as they arise throughout their visit. Blood pressures continue to be improved after normal saline bolus. On reevaluation, patient is resting comfortably, denies lightheadedness at rest, however becomes lightheaded on sitting up. Lactic acid is normal, CBC without leukocytosis or anemia, troponin is negative, BNP is not significantly elevated. Orthostatic vital signs will be obtained. Critical Care Time:         Disposition:  Admit    PLAN:  1. Current Discharge Medication List        2.    Follow-up Information    None       Return to ED if worse     Diagnosis     Clinical Impression: Acute lightheadedness and hypotension on arrival

## 2020-12-26 NOTE — DISCHARGE INSTRUCTIONS
You were seen in the ER for your symptoms. Please follow-up with your primary care doctor. Please stop taking the 50 mg of losartan, and decreased to 25 mg. Please return for worsening symptoms at any time.

## 2020-12-26 NOTE — ED NOTES
Orthostatic blood pressure is completed. Pt placed in position of comfort with call bell within reach.

## 2020-12-27 LAB
ATRIAL RATE: 69 BPM
CALCULATED P AXIS, ECG09: -22 DEGREES
CALCULATED R AXIS, ECG10: -24 DEGREES
CALCULATED T AXIS, ECG11: 33 DEGREES
DIAGNOSIS, 93000: NORMAL
P-R INTERVAL, ECG05: 116 MS
Q-T INTERVAL, ECG07: 400 MS
QRS DURATION, ECG06: 84 MS
QTC CALCULATION (BEZET), ECG08: 428 MS
VENTRICULAR RATE, ECG03: 69 BPM

## 2020-12-29 ENCOUNTER — HOSPITAL ENCOUNTER (INPATIENT)
Age: 85
LOS: 7 days | Discharge: NURSING, REHAB OR LONG-TERM CARE | DRG: 312 | End: 2021-01-07
Attending: STUDENT IN AN ORGANIZED HEALTH CARE EDUCATION/TRAINING PROGRAM | Admitting: INTERNAL MEDICINE
Payer: MEDICARE

## 2020-12-29 DIAGNOSIS — R42 LIGHTHEADEDNESS: Primary | ICD-10-CM

## 2020-12-29 PROBLEM — R53.1 GENERALIZED WEAKNESS: Status: ACTIVE | Noted: 2020-12-29

## 2020-12-29 LAB
ALBUMIN SERPL-MCNC: 3.4 G/DL (ref 3.5–5)
ALBUMIN/GLOB SERPL: 0.9 {RATIO} (ref 1.1–2.2)
ALP SERPL-CCNC: 52 U/L (ref 45–117)
ALT SERPL-CCNC: 23 U/L (ref 12–78)
ANION GAP SERPL CALC-SCNC: 5 MMOL/L (ref 5–15)
APPEARANCE UR: CLEAR
AST SERPL-CCNC: 25 U/L (ref 15–37)
ATRIAL RATE: 74 BPM
BACTERIA URNS QL MICRO: NEGATIVE /HPF
BASOPHILS # BLD: 0 K/UL (ref 0–0.1)
BASOPHILS NFR BLD: 1 % (ref 0–1)
BILIRUB SERPL-MCNC: 0.3 MG/DL (ref 0.2–1)
BILIRUB UR QL: NEGATIVE
BUN SERPL-MCNC: 15 MG/DL (ref 6–20)
BUN/CREAT SERPL: 12 (ref 12–20)
CALCIUM SERPL-MCNC: 9.1 MG/DL (ref 8.5–10.1)
CALCULATED P AXIS, ECG09: 26 DEGREES
CALCULATED R AXIS, ECG10: -27 DEGREES
CALCULATED T AXIS, ECG11: 10 DEGREES
CHLORIDE SERPL-SCNC: 107 MMOL/L (ref 97–108)
CO2 SERPL-SCNC: 26 MMOL/L (ref 21–32)
COLOR UR: NORMAL
CREAT SERPL-MCNC: 1.24 MG/DL (ref 0.55–1.02)
DIAGNOSIS, 93000: NORMAL
DIFFERENTIAL METHOD BLD: ABNORMAL
EOSINOPHIL # BLD: 0.1 K/UL (ref 0–0.4)
EOSINOPHIL NFR BLD: 1 % (ref 0–7)
EPITH CASTS URNS QL MICRO: NORMAL /LPF
ERYTHROCYTE [DISTWIDTH] IN BLOOD BY AUTOMATED COUNT: 14.6 % (ref 11.5–14.5)
GLOBULIN SER CALC-MCNC: 3.9 G/DL (ref 2–4)
GLUCOSE SERPL-MCNC: 86 MG/DL (ref 65–100)
GLUCOSE UR STRIP.AUTO-MCNC: NEGATIVE MG/DL
HCT VFR BLD AUTO: 37.5 % (ref 35–47)
HGB BLD-MCNC: 12.2 G/DL (ref 11.5–16)
HGB UR QL STRIP: NEGATIVE
HYALINE CASTS URNS QL MICRO: NORMAL /LPF (ref 0–5)
IMM GRANULOCYTES # BLD AUTO: 0 K/UL (ref 0–0.04)
IMM GRANULOCYTES NFR BLD AUTO: 1 % (ref 0–0.5)
KETONES UR QL STRIP.AUTO: NEGATIVE MG/DL
LEUKOCYTE ESTERASE UR QL STRIP.AUTO: NEGATIVE
LYMPHOCYTES # BLD: 1.2 K/UL (ref 0.8–3.5)
LYMPHOCYTES NFR BLD: 20 % (ref 12–49)
MCH RBC QN AUTO: 25.8 PG (ref 26–34)
MCHC RBC AUTO-ENTMCNC: 32.5 G/DL (ref 30–36.5)
MCV RBC AUTO: 79.3 FL (ref 80–99)
MONOCYTES # BLD: 0.6 K/UL (ref 0–1)
MONOCYTES NFR BLD: 11 % (ref 5–13)
NEUTS SEG # BLD: 3.9 K/UL (ref 1.8–8)
NEUTS SEG NFR BLD: 66 % (ref 32–75)
NITRITE UR QL STRIP.AUTO: NEGATIVE
NRBC # BLD: 0 K/UL (ref 0–0.01)
NRBC BLD-RTO: 0 PER 100 WBC
P-R INTERVAL, ECG05: 104 MS
PH UR STRIP: 6 [PH] (ref 5–8)
PLATELET # BLD AUTO: 258 K/UL (ref 150–400)
PMV BLD AUTO: 10.8 FL (ref 8.9–12.9)
POTASSIUM SERPL-SCNC: 3.6 MMOL/L (ref 3.5–5.1)
PROT SERPL-MCNC: 7.3 G/DL (ref 6.4–8.2)
PROT UR STRIP-MCNC: NEGATIVE MG/DL
Q-T INTERVAL, ECG07: 364 MS
QRS DURATION, ECG06: 88 MS
QTC CALCULATION (BEZET), ECG08: 404 MS
RBC # BLD AUTO: 4.73 M/UL (ref 3.8–5.2)
RBC #/AREA URNS HPF: NORMAL /HPF (ref 0–5)
SODIUM SERPL-SCNC: 138 MMOL/L (ref 136–145)
SP GR UR REFRACTOMETRY: 1.01 (ref 1–1.03)
TROPONIN I SERPL-MCNC: <0.05 NG/ML
UA: UC IF INDICATED,UAUC: NORMAL
UROBILINOGEN UR QL STRIP.AUTO: 0.2 EU/DL (ref 0.2–1)
VENTRICULAR RATE, ECG03: 74 BPM
WBC # BLD AUTO: 5.8 K/UL (ref 3.6–11)
WBC URNS QL MICRO: NORMAL /HPF (ref 0–4)

## 2020-12-29 PROCEDURE — 99285 EMERGENCY DEPT VISIT HI MDM: CPT

## 2020-12-29 PROCEDURE — 77030038269 HC DRN EXT URIN PURWCK BARD -A

## 2020-12-29 PROCEDURE — 2709999900 HC NON-CHARGEABLE SUPPLY

## 2020-12-29 PROCEDURE — 81001 URINALYSIS AUTO W/SCOPE: CPT

## 2020-12-29 PROCEDURE — 99218 HC RM OBSERVATION: CPT

## 2020-12-29 PROCEDURE — 93005 ELECTROCARDIOGRAM TRACING: CPT

## 2020-12-29 PROCEDURE — 84484 ASSAY OF TROPONIN QUANT: CPT

## 2020-12-29 PROCEDURE — 80053 COMPREHEN METABOLIC PANEL: CPT

## 2020-12-29 PROCEDURE — 36415 COLL VENOUS BLD VENIPUNCTURE: CPT

## 2020-12-29 PROCEDURE — 85025 COMPLETE CBC W/AUTO DIFF WBC: CPT

## 2020-12-29 PROCEDURE — 74011250636 HC RX REV CODE- 250/636: Performed by: STUDENT IN AN ORGANIZED HEALTH CARE EDUCATION/TRAINING PROGRAM

## 2020-12-29 RX ORDER — ONDANSETRON 2 MG/ML
4 INJECTION INTRAMUSCULAR; INTRAVENOUS
Status: DISCONTINUED | OUTPATIENT
Start: 2020-12-29 | End: 2021-01-07 | Stop reason: HOSPADM

## 2020-12-29 RX ORDER — SODIUM CHLORIDE 0.9 % (FLUSH) 0.9 %
5-40 SYRINGE (ML) INJECTION EVERY 8 HOURS
Status: DISCONTINUED | OUTPATIENT
Start: 2020-12-29 | End: 2021-01-07 | Stop reason: HOSPADM

## 2020-12-29 RX ORDER — PROMETHAZINE HYDROCHLORIDE 25 MG/1
12.5 TABLET ORAL
Status: DISCONTINUED | OUTPATIENT
Start: 2020-12-29 | End: 2021-01-07 | Stop reason: HOSPADM

## 2020-12-29 RX ORDER — POLYETHYLENE GLYCOL 3350 17 G/17G
17 POWDER, FOR SOLUTION ORAL DAILY PRN
Status: DISCONTINUED | OUTPATIENT
Start: 2020-12-29 | End: 2021-01-04

## 2020-12-29 RX ORDER — ACETAMINOPHEN 325 MG/1
650 TABLET ORAL
Status: DISCONTINUED | OUTPATIENT
Start: 2020-12-29 | End: 2020-12-31

## 2020-12-29 RX ORDER — ACETAMINOPHEN 650 MG/1
650 SUPPOSITORY RECTAL
Status: DISCONTINUED | OUTPATIENT
Start: 2020-12-29 | End: 2020-12-31

## 2020-12-29 RX ORDER — ENOXAPARIN SODIUM 100 MG/ML
40 INJECTION SUBCUTANEOUS DAILY
Status: DISCONTINUED | OUTPATIENT
Start: 2020-12-30 | End: 2020-12-31

## 2020-12-29 RX ORDER — SODIUM CHLORIDE 0.9 % (FLUSH) 0.9 %
5-40 SYRINGE (ML) INJECTION AS NEEDED
Status: DISCONTINUED | OUTPATIENT
Start: 2020-12-29 | End: 2021-01-07 | Stop reason: HOSPADM

## 2020-12-29 RX ADMIN — Medication 10 ML: at 18:40

## 2020-12-29 RX ADMIN — SODIUM CHLORIDE 500 ML: 9 INJECTION, SOLUTION INTRAVENOUS at 14:45

## 2020-12-29 NOTE — ED NOTES
Unable to successfully ambulate pt. Pt is unable to turn in the bed without passively moving her left leg d/t weakness. Pt has a lot of difficulty repositioning her ownself in the bed. Pt states that she does not walk well at all and has not been able to walk with her walker at home. Her son states that she is not able to walk.

## 2020-12-29 NOTE — H&P
Hospitalist Admission Note    NAME: Ronald De La Rosa   :  1934   MRN:  [id number]     Date/Time:  2020 4:53 PM    Patient PCP: Dallas Hunt MD  ______________________________________________________________________  Given the patient's current clinical presentation, I have a high level of concern for decompensation if discharged from the emergency department. Complex decision making was performed, which includes reviewing the patient's available past medical records, laboratory results, and x-ray films. My assessment of this patient's clinical condition and my plan of care is as follows. Assessment / Plan:  Generalized weakness  Symptomatic orthostatic hypotension  CAD  HTN  Admit for Observation  PT/OT  Holding home BP meds  Compression stockings  No evidence of infection  Initial trop negative  PE is non focal    Code Status: Full  Surrogate Decision Maker: Son  DVT Prophylaxis: Lovenox  GI Prophylaxis: not indicated  Baseline: Independent      Subjective:   CHIEF COMPLAINT: weakness    HISTORY OF PRESENT ILLNESS:     Jasmyn Chacko is a 80 y.o.  female who presents with CC listed above. I had seen the patient 2-3 days ago in the ER when she presented with similar complaints. At that time, pt received IVF in the ER, we discussed adjusting her BP meds, and she was able to ambulate well with a walker. Unfortunately it seems that pt's condition has continued to worsen and she is unable to manage herself at home. We were asked to admit for work up and evaluation of the above problems. Past Medical History:   Diagnosis Date    CAD (coronary artery disease)     Hypertension         No past surgical history on file. Social History     Tobacco Use    Smoking status: Never Smoker    Smokeless tobacco: Never Used   Substance Use Topics    Alcohol use: No        No family history on file.   Allergies   Allergen Reactions    Codeine Hives  Pcn [Penicillins] Hives    Sulfa (Sulfonamide Antibiotics) Hives        Prior to Admission medications    Medication Sig Start Date End Date Taking? Authorizing Provider   losartan (COZAAR) 25 mg tablet Take 1 Tab by mouth daily. 12/26/20   Beba Ann MD   diltiazem (TAZTIA XT) 300 mg SR capsule Take 300 mg by mouth daily. Other, MD Jeanine       REVIEW OF SYSTEMS:     I am not able to complete the review of systems because:    The patient is intubated and sedated    The patient has altered mental status due to his acute medical problems    The patient has baseline aphasia from prior stroke(s)    The patient has baseline dementia and is not reliable historian    The patient is in acute medical distress and unable to provide information           Total of 12 systems reviewed as follows:       POSITIVE= underlined text  Negative = text not underlined  General:  fever, chills, sweats, generalized weakness, weight loss/gain,      loss of appetite   Eyes:    blurred vision, eye pain, loss of vision, double vision  ENT:    rhinorrhea, pharyngitis   Respiratory:   cough, sputum production, SOB, AGUILERA, wheezing, pleuritic pain   Cardiology:   chest pain, palpitations, orthopnea, PND, edema, syncope   Gastrointestinal:  abdominal pain , N/V, diarrhea, dysphagia, constipation, bleeding   Genitourinary:  frequency, urgency, dysuria, hematuria, incontinence   Muskuloskeletal :  arthralgia, myalgia, back pain  Hematology:  easy bruising, nose or gum bleeding, lymphadenopathy   Dermatological: rash, ulceration, pruritis, color change / jaundice  Endocrine:   hot flashes or polydipsia   Neurological:  headache, dizziness, confusion, focal weakness, paresthesia,     Speech difficulties, memory loss, gait difficulty  Psychological: Feelings of anxiety, depression, agitation    Objective:   VITALS:    Visit Vitals  /76   Pulse 85   Temp 98.1 °F (36.7 °C)   Resp 19   Ht 5' 6\" (1.676 m)   Wt 65.8 kg (145 lb)   SpO2 98% BMI 23.40 kg/m²       PHYSICAL EXAM:    General:    Alert, cooperative, no distress, appears stated age. HEENT: Atraumatic, anicteric sclerae, pink conjunctivae     No oral ulcers, mucosa moist, throat clear, dentition fair  Neck:  Supple, symmetrical,  thyroid: non tender  Lungs:   Clear to auscultation bilaterally. No Wheezing or Rhonchi. No rales. Chest wall:  No tenderness  No Accessory muscle use. Heart:   Regular  rhythm,  No  murmur   No edema  Abdomen:   Soft, non-tender. Not distended. Bowel sounds normal  Extremities: No cyanosis. No clubbing,      Skin turgor normal, Capillary refill normal, Radial dial pulse 2+  Skin:     Not pale. Not Jaundiced  No rashes   Psych:  Good insight. Not depressed. Not anxious or agitated. Neurologic: EOMs intact. No facial asymmetry. No aphasia or slurred speech. Symmetrical strength, Sensation grossly intact. Alert and oriented X 4.     _______________________________________________________________________  Care Plan discussed with:    Comments   Patient x    Family      RN x    Care Manager                    Consultant:      _______________________________________________________________________  Expected  Disposition:   Home with Family    HH/PT/OT/RN x   SNF/LTC    [facility]    ________________________________________________________________________  TOTAL TIME:  54 Minutes    Critical Care Provided     Minutes non procedure based      Comments     Reviewed previous records   >50% of visit spent in counseling and coordination of care  Discussion with patient and/or family and questions answered       ________________________________________________________________________  Signed: Sveta Crocker MD    Procedures: see electronic medical records for all procedures/Xrays and details which were not copied into this note but were reviewed prior to creation of Plan.     LAB DATA REVIEWED:    Recent Results (from the past 24 hour(s))   EKG, 12 LEAD, INITIAL Collection Time: 12/29/20  1:05 PM   Result Value Ref Range    Ventricular Rate 74 BPM    Atrial Rate 74 BPM    P-R Interval 104 ms    QRS Duration 88 ms    Q-T Interval 364 ms    QTC Calculation (Bezet) 404 ms    Calculated P Axis 26 degrees    Calculated R Axis -27 degrees    Calculated T Axis 10 degrees    Diagnosis       Sinus rhythm with short DE  Nonspecific ST and T wave abnormality  Confirmed by Hailey Christianson ([id number]) on 12/29/2020 4:00:16 PM     CBC WITH AUTOMATED DIFF    Collection Time: 12/29/20  1:50 PM   Result Value Ref Range    WBC 5.8 3.6 - 11.0 K/uL    RBC 4.73 3.80 - 5.20 M/uL    HGB 12.2 11.5 - 16.0 g/dL    HCT 37.5 35.0 - 47.0 %    MCV 79.3 (L) 80.0 - 99.0 FL    MCH 25.8 (L) 26.0 - 34.0 PG    MCHC 32.5 30.0 - 36.5 g/dL    RDW 14.6 (H) 11.5 - 14.5 %    PLATELET 492 698 - 678 K/uL    MPV 10.8 8.9 - 12.9 FL    NRBC 0.0 0  WBC    ABSOLUTE NRBC 0.00 0.00 - 0.01 K/uL    NEUTROPHILS 66 32 - 75 %    LYMPHOCYTES 20 12 - 49 %    MONOCYTES 11 5 - 13 %    EOSINOPHILS 1 0 - 7 %    BASOPHILS 1 0 - 1 %    IMMATURE GRANULOCYTES 1 (H) 0.0 - 0.5 %    ABS. NEUTROPHILS 3.9 1.8 - 8.0 K/UL    ABS. LYMPHOCYTES 1.2 0.8 - 3.5 K/UL    ABS. MONOCYTES 0.6 0.0 - 1.0 K/UL    ABS. EOSINOPHILS 0.1 0.0 - 0.4 K/UL    ABS. BASOPHILS 0.0 0.0 - 0.1 K/UL    ABS. IMM. GRANS. 0.0 0.00 - 0.04 K/UL    DF AUTOMATED     METABOLIC PANEL, COMPREHENSIVE    Collection Time: 12/29/20  1:50 PM   Result Value Ref Range    Sodium 138 136 - 145 mmol/L    Potassium 3.6 3.5 - 5.1 mmol/L    Chloride 107 97 - 108 mmol/L    CO2 26 21 - 32 mmol/L    Anion gap 5 5 - 15 mmol/L    Glucose 86 65 - 100 mg/dL    BUN 15 6 - 20 MG/DL    Creatinine 1.24 (H) 0.55 - 1.02 MG/DL    BUN/Creatinine ratio 12 12 - 20      GFR est AA 50 (L) >60 ml/min/1.73m2    GFR est non-AA 41 (L) >60 ml/min/1.73m2    Calcium 9.1 8.5 - 10.1 MG/DL    Bilirubin, total 0.3 0.2 - 1.0 MG/DL    ALT (SGPT) 23 12 - 78 U/L    AST (SGOT) 25 15 - 37 U/L    Alk.  phosphatase 52 45 - 117 U/L    Protein, total 7.3 6.4 - 8.2 g/dL    Albumin 3.4 (L) 3.5 - 5.0 g/dL    Globulin 3.9 2.0 - 4.0 g/dL    A-G Ratio 0.9 (L) 1.1 - 2.2     TROPONIN I    Collection Time: 12/29/20  1:50 PM   Result Value Ref Range    Troponin-I, Qt. <0.05 <0.05 ng/mL   URINALYSIS W/ REFLEX CULTURE    Collection Time: 12/29/20  3:19 PM    Specimen: Urine   Result Value Ref Range    Color YELLOW/STRAW      Appearance CLEAR CLEAR      Specific gravity 1.012 1.003 - 1.030      pH (UA) 6.0 5.0 - 8.0      Protein Negative NEG mg/dL    Glucose Negative NEG mg/dL    Ketone Negative NEG mg/dL    Bilirubin Negative NEG      Blood Negative NEG      Urobilinogen 0.2 0.2 - 1.0 EU/dL    Nitrites Negative NEG      Leukocyte Esterase Negative NEG      WBC 0-4 0 - 4 /hpf    RBC 0-5 0 - 5 /hpf    Epithelial cells FEW FEW /lpf    Bacteria Negative NEG /hpf    UA:UC IF INDICATED CULTURE NOT INDICATED BY UA RESULT CNI      Hyaline cast 0-2 0 - 5 /lpf

## 2020-12-29 NOTE — ED NOTES
TRANSFER - OUT REPORT:    Verbal report given to Tay Griffin (name) on Naomi Armenta  being transferred to Room 2113 Gen Surg (unit) for routine progression of care       Report consisted of patients Situation, Background, Assessment and   Recommendations(SBAR). Information from the following report(s) SBAR, ED Summary, [facility] and Recent Results was reviewed with the receiving nurse. Lines:   Peripheral IV 12/29/20 Left;Mid Arm (Active)   Site Assessment Clean, dry, & intact 12/29/20 1355   Phlebitis Assessment 0 12/29/20 1355   Infiltration Assessment 0 12/29/20 1355   Dressing Status Clean, dry, & intact 12/29/20 1355   Dressing Type Transparent 12/29/20 1355   Hub Color/Line Status Flushed;Capped 12/29/20 1355        Opportunity for questions and clarification was provided.

## 2020-12-29 NOTE — PROGRESS NOTES
CM met with pt and sonChel, at bedside to discuss discharge plans. Pt lives alone and states that she has been having trouble performing her adl's in home due to weakness. Pt states that she uses a walker in the home. Her son expressed concerns with her returning home at this time, and states that she has had home health before and they \"didn't do anything. \" Pt is interested in transitioning to a rehab setting. PT/OT evaluations will be needed to determine SNF vs IPR needs. Pt will also need COVID test prior to d/c. Pt's insurance will also need to approve authorization for pt to transfer. CM discussed these concerns with attending physician. Pt has been placed in observation status. Oral and Written notification given to patient informing them that they are currently an Outpatient receiving care in our facility. Outpatient services include Observation Services. Reason for Admission:  Generalized weakness, symptomatic orthostatic hypotension, CAD, HTN                    RUR Score: N/A                    Plan for utilizing home health:  Pt states that she would prefer inpatient rehab (SNF vs IPR) instead of going home with home health. Pt has had home health previously and state that they \"don't do anything. \"       PCP: First and Last name: Ethan Aponte    Name of Practice:    Are you a current patient: Yes Yes/No:    Approximate date of last visit:    Can you participate in a virtual visit with your PCP: With assistance from family                    Current Advanced Directive/Advance Care Plan: Pt is a FULL code and has ACP documents on file listing sonChel, as primary surrogate decision maker, sonBeata, as secondary decision maker. Transition of Care Plan: Unable to determine at this time. PT/OT evaluations in place to assist with disposition. Care Management Interventions  PCP Verified by CM:  Yes  Transition of Care Consult (CM Consult): Discharge Planning  Discharge Durable Medical Equipment: No(Pt has a walker)  Physical Therapy Consult: Yes  Occupational Therapy Consult: Yes  Speech Therapy Consult: No  Current Support Network: Lives Alone  Confirm Follow Up Transport: Family  Discharge Location  Discharge Placement: Unable to determine at this time(Awaiting PT/OT consults for assistance with disposition)      [facility], [address]

## 2020-12-29 NOTE — PROGRESS NOTES
TRANSFER - IN REPORT:    Verbal report received from [address] RN on Gonzalo Maharaj  being received from ED for routine progression of care      Report consisted of patients Situation, Background, Assessment and   Recommendations(SBAR). Information from the following report(s) SBAR, Kardex, ED Summary, Intake/Output, MAR and Recent Results was reviewed with the receiving nurse. Opportunity for questions and clarification was provided. Assessment completed upon patients arrival to unit and care assumed.      Steph Taylor RN

## 2020-12-29 NOTE — ED NOTES
Pt was placed back on pure wick and changed her gown and pad beneath her. Notified the pt that she is going to be admitted a called dietary for a GI lite tray.

## 2020-12-29 NOTE — PROGRESS NOTES
Problem: Falls - Risk of  Goal: *Absence of Falls  Description: Document [place] Flow Fall Risk and appropriate interventions in the flowsheet.   Outcome: Progressing Towards Goal  Note: Fall Risk Interventions:  Mobility Interventions: Assess mobility with egress test, Bed/chair exit alarm, Communicate number of staff needed for ambulation/transfer, Mechanical lift, OT consult for ADLs, Patient to call before getting OOB, PT Consult for mobility concerns, PT Consult for assist device competence, Strengthening exercises (ROM-active/passive)              Elimination Interventions: Bed/chair exit alarm, Call light in reach, Elevated toilet seat, Patient to call for help with toileting needs, Stay With Me (per policy), Toilet paper/wipes in reach, Toileting schedule/hourly rounds

## 2020-12-29 NOTE — ED NOTES
Pt up to the side of the bed with help of Dr. Suleman Murillo. Weakness noted that is generalized as well as extreme LLE weakness causing difficulty to balance when standing.  Stands with assistance x2

## 2020-12-30 LAB
ANION GAP SERPL CALC-SCNC: 6 MMOL/L (ref 5–15)
BASOPHILS # BLD: 0 K/UL (ref 0–0.1)
BASOPHILS NFR BLD: 1 % (ref 0–1)
BUN SERPL-MCNC: 13 MG/DL (ref 6–20)
BUN/CREAT SERPL: 13 (ref 12–20)
CALCIUM SERPL-MCNC: 9 MG/DL (ref 8.5–10.1)
CHLORIDE SERPL-SCNC: 108 MMOL/L (ref 97–108)
CO2 SERPL-SCNC: 24 MMOL/L (ref 21–32)
CREAT SERPL-MCNC: 1 MG/DL (ref 0.55–1.02)
DIFFERENTIAL METHOD BLD: ABNORMAL
EOSINOPHIL # BLD: 0.2 K/UL (ref 0–0.4)
EOSINOPHIL NFR BLD: 3 % (ref 0–7)
ERYTHROCYTE [DISTWIDTH] IN BLOOD BY AUTOMATED COUNT: 14.4 % (ref 11.5–14.5)
GLUCOSE SERPL-MCNC: 89 MG/DL (ref 65–100)
HCT VFR BLD AUTO: 36.8 % (ref 35–47)
HGB BLD-MCNC: 11.9 G/DL (ref 11.5–16)
IMM GRANULOCYTES # BLD AUTO: 0 K/UL (ref 0–0.04)
IMM GRANULOCYTES NFR BLD AUTO: 1 % (ref 0–0.5)
LYMPHOCYTES # BLD: 1.5 K/UL (ref 0.8–3.5)
LYMPHOCYTES NFR BLD: 29 % (ref 12–49)
MAGNESIUM SERPL-MCNC: 1.9 MG/DL (ref 1.6–2.4)
MCH RBC QN AUTO: 25.1 PG (ref 26–34)
MCHC RBC AUTO-ENTMCNC: 32.3 G/DL (ref 30–36.5)
MCV RBC AUTO: 77.5 FL (ref 80–99)
MONOCYTES # BLD: 0.6 K/UL (ref 0–1)
MONOCYTES NFR BLD: 11 % (ref 5–13)
NEUTS SEG # BLD: 2.9 K/UL (ref 1.8–8)
NEUTS SEG NFR BLD: 55 % (ref 32–75)
NRBC # BLD: 0 K/UL (ref 0–0.01)
NRBC BLD-RTO: 0 PER 100 WBC
PLATELET # BLD AUTO: 253 K/UL (ref 150–400)
PMV BLD AUTO: 10.8 FL (ref 8.9–12.9)
POTASSIUM SERPL-SCNC: 3.6 MMOL/L (ref 3.5–5.1)
RBC # BLD AUTO: 4.75 M/UL (ref 3.8–5.2)
SODIUM SERPL-SCNC: 138 MMOL/L (ref 136–145)
WBC # BLD AUTO: 5.2 K/UL (ref 3.6–11)

## 2020-12-30 PROCEDURE — 80048 BASIC METABOLIC PNL TOTAL CA: CPT

## 2020-12-30 PROCEDURE — 36415 COLL VENOUS BLD VENIPUNCTURE: CPT

## 2020-12-30 PROCEDURE — 87635 SARS-COV-2 COVID-19 AMP PRB: CPT

## 2020-12-30 PROCEDURE — 97116 GAIT TRAINING THERAPY: CPT

## 2020-12-30 PROCEDURE — 83735 ASSAY OF MAGNESIUM: CPT

## 2020-12-30 PROCEDURE — 96372 THER/PROPH/DIAG INJ SC/IM: CPT

## 2020-12-30 PROCEDURE — 99218 HC RM OBSERVATION: CPT

## 2020-12-30 PROCEDURE — 74011250637 HC RX REV CODE- 250/637: Performed by: NURSE PRACTITIONER

## 2020-12-30 PROCEDURE — 97535 SELF CARE MNGMENT TRAINING: CPT

## 2020-12-30 PROCEDURE — 97161 PT EVAL LOW COMPLEX 20 MIN: CPT

## 2020-12-30 PROCEDURE — 74011250636 HC RX REV CODE- 250/636: Performed by: GENERAL ACUTE CARE HOSPITAL

## 2020-12-30 PROCEDURE — 97165 OT EVAL LOW COMPLEX 30 MIN: CPT

## 2020-12-30 PROCEDURE — 85025 COMPLETE CBC W/AUTO DIFF WBC: CPT

## 2020-12-30 RX ORDER — IBUPROFEN 400 MG/1
400 TABLET ORAL
Status: DISCONTINUED | OUTPATIENT
Start: 2020-12-30 | End: 2021-01-07 | Stop reason: HOSPADM

## 2020-12-30 RX ADMIN — IBUPROFEN 400 MG: 400 TABLET, FILM COATED ORAL at 17:17

## 2020-12-30 RX ADMIN — Medication 10 ML: at 02:12

## 2020-12-30 RX ADMIN — Medication 10 ML: at 21:18

## 2020-12-30 RX ADMIN — Medication 1 LOZENGE: at 17:17

## 2020-12-30 RX ADMIN — Medication 10 ML: at 05:13

## 2020-12-30 RX ADMIN — Medication 10 ML: at 14:00

## 2020-12-30 RX ADMIN — ENOXAPARIN SODIUM 40 MG: 40 INJECTION SUBCUTANEOUS at 08:30

## 2020-12-30 NOTE — PROGRESS NOTES
Plan:  -[place] [address] test pending  -Yamel Vega pending       2:17PM  CM met with son in bates. Son requesting SNF at d/c due to concerns about pt's weakness and the fact that she lives alone. CM discussed location possibilities. Son interested in [address] [place] [address] and [facility].. Son provided copies of AMDs. CM will confirm whether they are already listed in chart. CM in to speak with pt. Pt agreeable to SNF, either facility, and okay with CM keeping son updated. COVID test ordered. Nursing aware to complete ASAP. CM PC to [employer] to initiate auth requested. Spoke with [employer]. Pending auth [id number]. Clinicals faxed to [facility]. Planning for d/c tomorrow if COVID negative and insurance auth obtained. All in agreement with plan. CM will continue to follow and assist with d/c planning.        Sami Brooks MSW  Care Manager

## 2020-12-30 NOTE — PROGRESS NOTES
Problem: Self Care Deficits Care Plan (Adult)  Goal: *Acute Goals and Plan of Care (Insert Text)  Description:   FUNCTIONAL STATUS PRIOR TO ADMISSION:Pt reports ambulation with use of RW. Denies history of falls. Reports increased weakness/difficulty caring for herself over previous 1 month. Pt reports x1 month of increased dizziness with mobility, stating she was mostly spending the day in bed as a result. Admitted to ER for same symptoms 12-26; meds changed, went home and continued to feel worse. HOME SUPPORT PRIOR TO ADMISSION: The patient lives alone. Son lives in the area and assists as needed. Pt reports she previously had caregiver 5 days/wk for 5 hrs however caregiver has stopped coming to her home. Occupational Therapy Goals  Initiated 12/30/2020  1. Patient will perform standing grooming with supervision/set-up within 7 day(s). 2.  Patient will perform lower body dressing with minimal assistance/contact guard assist within 7 day(s). 3.  Patient will perform bathing with minimal assistance/contact guard assist within 7 day(s). 4.  Patient will perform toilet transfers with minimal assistance/contact guard assist within 7 day(s). 5.  Patient will perform all aspects of toileting with minimal assistance/contact guard assist within 7 day(s). 6.  Patient will participate in upper extremity therapeutic exercise/activities with supervision/set-up for 5 minutes within 7 day(s). 7.  Patient will utilize energy conservation, pain management, fall prevention and hypotension safety precaution techniques during functional activities with verbal and visual cues within 7 day(s). Outcome: Progressing Towards Goal   OCCUPATIONAL THERAPY EVALUATION  Patient:  Aj Reynolds (80 y.o. female)  Date: 12/30/2020  Primary Diagnosis: Generalized weakness [R53.1]        Precautions:  Fall    ASSESSMENT  Based on the objective data described below, the patient presents with orthostatic hypotension/dizziness and general weakness, to ER 12-26 and 12-29. Patient with decreased standing balance and endurance with ADLs and c/o 6/10 back pain at rest and with activity. Current Level of Function Impacting Discharge (ADLs/self-care): set up to min A UE ADLs, depending on dizziness and back pain, Mod/AMax A-D LE ADLs- limited most by back pain this session; Max A bed mobility, mod A overall functional transfers with increased time and c/o not feeling well    Functional Outcome Measure: The patient scored Total: 35/100 on the Barthel Index outcome measure which is indicative of 65% impaired ability to care for basic self needs/dependency on others; inferred 100% dependency on others for instrumental ADLs. Other factors to consider for discharge: lived alone     Patient will benefit from skilled therapy intervention to address the above noted impairments. PLAN :  Recommendations and Planned Interventions: self care training, functional mobility training, therapeutic exercise, balance training, therapeutic activities, cognitive retraining, endurance activities, patient education, home safety training, and family training/education    Frequency/Duration: Patient will be followed by occupational therapy 4 times a week to address goals. Recommendation for discharge: (in order for the patient to meet his/her long term goals)  Therapy up to 5 days/week in SNF setting    This discharge recommendation:  A follow-up discussion with the attending provider and/or case management is planned    IF patient discharges home will need the following DME: AE: long handled bathing, AE: long handled dressing, bedside commode, transfer bench, and walker: rolling       SUBJECTIVE:   Patient stated I just cant do my socks because my back hurts too much.     OBJECTIVE DATA SUMMARY:   HISTORY:   Past Medical History:   Diagnosis Date    CAD (coronary artery disease)     Hypertension    No past surgical history on file.    Expanded or extensive additional review of patient history:     Home Situation  Home Environment: Private residence  # Steps to Enter: 0  One/Two Story Residence: One story  Living Alone: Yes  Support Systems: Child(jorden)  Patient Expects to be Discharged to[de-identified] Rehabilitation facility  Current DME Used/Available at Home: Wheelchair, Walker, rolling, Shower chair, Cane, straight  Tub or Shower Type: Tub/Shower combination    Hand dominance: Right    EXAMINATION OF PERFORMANCE DEFICITS:  Cognitive/Behavioral Status:  Neurologic State: Alert; Appropriate for age  Orientation Level: Oriented X4  Cognition: Follows commands; Appropriate for age attention/concentration(attentions declines with hypotension)  Perception: Appears intact  Perseveration: No perseveration noted  Safety/Judgement: Fall prevention; Awareness of environment    Skin: see nsg notes    Edema: see nsg notes    Hearing: Auditory  Auditory Impairment: Hard of hearing, bilateral    Vision/Perceptual:                           Acuity: (WFL)    Corrective Lenses: Glasses    Range of Motion:  B UE  AROM: Generally decreased, functional  Unable to bend forward to feet or bring LEs up/jnaay sit                          Strength:  B UE  Strength: Generally decreased, functional  Decreased LE strength with decreased standing and LE ADL tolerance                Coordination:  Coordination: Generally decreased, functional  Fine Motor Skills-Upper: Left Intact; Right Intact(declines w/fatigue)    Gross Motor Skills-Upper: Left Intact; Right Intact(declines with fatigue)    Tone & Sensation:    Tone: Normal                         Balance:  Sitting: Impaired; Without support  Sitting - Static: Good (unsupported)  Sitting - Dynamic: Fair (occasional)  Standing: Impaired; With support(RW)  Standing - Static: Fair;Constant support  Standing - Dynamic : Fair;Constant support    Functional Mobility and Transfers for ADLs:  Bed Mobility:  Rolling: Maximum assistance; Additional time; Adaptive equipment  Supine to Sit: Maximum assistance;Assist x2  Sit to Supine: Maximum assistance  Scooting: Minimum assistance    Transfers:  Sit to Stand: Moderate assistance  Stand to Sit: Contact guard assistance  Bed to Chair: Moderate assistance  Toilet Transfer : Moderate assistance    ADL Assessment:  Feeding: Setup    Oral Facial Hygiene/Grooming: Minimum assistance    Bathing: Maximum assistance    Upper Body Dressing: Minimum assistance    Lower Body Dressing: Total assistance(\"I just dont feel like I can because of my back pain\")    Toileting: Moderate assistance;Maximum assistance                ADL Intervention and task modifications:     Initiated training of orthostatic hypotension training, increased fluids, stability before mobility, pain management    Cognitive Retraining  Safety/Judgement: Fall prevention; Awareness of environment      Functional Measure:  Barthel Index:    Bathin  Bladder: 5  Bowels: 5  Groomin  Dressin  Feeding: 10  Mobility: 0  Stairs: 0  Toilet Use: 5  Transfer (Bed to Chair and Back): 5  Total: 35/100        The Barthel ADL Index: Guidelines  1. The index should be used as a record of what a patient does, not as a record of what a patient could do. 2. The main aim is to establish degree of independence from any help, physical or verbal, however minor and for whatever reason. 3. The need for supervision renders the patient not independent. 4. A patient's performance should be established using the best available evidence. Asking the patient, friends/relatives and nurses are the usual sources, but direct observation and common sense are also important. However direct testing is not needed. 5. Usually the patient's performance over the preceding 24-48 hours is important, but occasionally longer periods will be relevant. 6. Middle categories imply that the patient supplies over 50 per cent of the effort.   7. Use of aids to be independent is allowed. Armin Coil., Barthel, D.W. (5313). Functional evaluation: the Barthel Index. [address] (14)2. [place] Doyle, Lisa Bond., Meryl Adkins., [place], [address] (). Measuring the change indisability after inpatient rehabilitation; comparison of the responsiveness of the Barthel Index and Functional [place] Measure. Journal of Neurology, Neurosurgery, and Psychiatry, 66(4), 869-047. REJI Boswell, KONRAD Rajan, & Rashmi Waite M.A. (2004.) Assessment of post-stroke quality of life in cost-effectiveness studies: The usefulness of the Barthel Index and the EuroQoL-5D. Quality of Life Research, 15, 229-65         Occupational Therapy Evaluation Charge Determination   History Examination Decision-Making   LOW Complexity : Brief history review  MEDIUM Complexity : 3-5 performance deficits relating to physical, cognitive , or psychosocial skils that result in activity limitations and / or participation restrictions MEDIUM Complexity : Patient may present with comorbidities that affect occupational performnce. Miniml to moderate modification of tasks or assistance (eg, physical or verbal ) with assesment(s) is necessary to enable patient to complete evaluation       Based on the above components, the patient evaluation is determined to be of the following complexity level: LOW   Pain Ratin/10 low back pain; pain management reviewed nsg notified; patient reports \"Tylenol causes HTN\"    Activity Tolerance:   Fair, Poor, SpO2 stable on RA, requires frequent rest breaks, and signs and symptoms of orthostatic hypotension    After treatment patient left in no apparent distress:    Supine in bed, Call bell within reach, Bed / chair alarm activated, Side rails x 3, and nsg notified of request for pain meds and cough drops    COMMUNICATION/EDUCATION:   The patients plan of care was discussed with: Physical therapist and Registered nurse.      Home safety education was provided and the patient/caregiver indicated understanding., Patient/family have participated as able in goal setting and plan of care. , and Patient/family agree to work toward stated goals and plan of care. This patients plan of care is appropriate for delegation to REBEKAH.     Thank you for this referral.  Donna Farfan OTR/L  Time Calculation: 23 mins

## 2020-12-30 NOTE — PROGRESS NOTES
End of Shift Note    Bedside shift change report given to Smooth Lomax RN by Jay Jay Dick RN (offgoing nurse). Report included the following information SBAR, ED Summary, Procedure Summary, Intake/Output, MAR and Recent Results    Shift worked:  5542-5980     Shift summary and any significant changes:     Pt admitted to unit, vitals stable. No complaints of pain. Pt is resting comfortably. Concerns for physician to address:  n/a     Zone phone for oncoming shift:   [phone]     Activity:     Number times ambulated in hallways past shift: 0  Number of times OOB to chair past shift: 0    Cardiac:   Cardiac Monitoring: No    Cardiac Rhythm: Normal sinus rhythm    Access:   Current line(s): PIV     Genitourinary:   Urinary status: incontinent and external catheter    Respiratory:   O2 Device: Room air  Chronic home O2 use?: NO  Incentive spirometer at bedside: N/A     GI:     Current diet:  DIET GI LITE (POST SURGICAL)  Passing flatus: Yes  Tolerating current diet: Yes    Pain Management:   Patient states pain is manageable on current regimen: YES    Skin:  Cordell Score: 15  Interventions: turn team, float heels, increase time out of bed, foam dressing, PT/OT consult, limit briefs and internal/external urinary devices    Patient Safety:  Fall Score:  Total Score: 2  Interventions: bed/chair alarm, assistive device (walker, cane, etc), gripper socks, pt to call before getting OOB and stay with me (per policy)  High Fall Risk: Yes    Length of Stay:  Expected LOS: - - -  Actual LOS: 0      Jay Jay Dick RN

## 2020-12-30 NOTE — PROGRESS NOTES
End of Shift Note    Bedside shift change report given to Elda Pereira (oncoming nurse) by Gio Myles RN (offgoing nurse). Report included the following information SBAR, Kardex, Intake/Output, MAR and Recent Results    Shift worked:  7a-7p     Shift summary and any significant changes:     Pt worked with PT and OT this shift. Covid test obtained per order, sent to lab. Pt complains of lower back pain this shift but states tylenol \"raises my blood pressure so I never take it\". Spoke with Dinora NP who ordered motrin. Administered per MAR. Pt complains of dry cough, gave pt lozenges per MAR. Good PO intake. Concerns for physician to address:  Placement     Zone phone for oncoming shift:   [phone]       Activity:  Activity Level: Up with Assistance  Number times ambulated in hallways past shift: 1  Number of times OOB to chair past shift: 0    Cardiac:   Cardiac Monitoring: No      Cardiac Rhythm: Normal sinus rhythm    Access:   Current line(s): PIV     Genitourinary:   Urinary status: voiding and external catheter    Respiratory:   O2 Device: Room air  Chronic home O2 use?: NO  Incentive spirometer at bedside: NO     GI:     Current diet:  DIET GI LITE (POST SURGICAL)  Passing flatus: YES  Tolerating current diet: YES  % Diet Eaten: 50 %    Pain Management:   Patient states pain is manageable on current regimen: YES    Skin:  Cordell Score: 18  Interventions: turn team and internal/external urinary devices    Patient Safety:  Fall Score:  Total Score: 2  Interventions: bed/chair alarm, gripper socks and pt to call before getting OOB  High Fall Risk: Yes    Length of Stay:  Expected LOS: - - -  Actual LOS: 0      Gio Myles RN

## 2020-12-30 NOTE — PROGRESS NOTES
Bedside report given to Ryan Fields RN to include SBAR, Medex, Kardex, Updates and questions and answers.

## 2020-12-30 NOTE — PROGRESS NOTES
I reviewed pertinent labs and imaging, and discussed /agreed on the plan of care with Dr. Harmony Gutierres Hospitalist Progress Note    NAME: Katharine Scott   :  1934   MRN:  [id number]       Assessment / Plan:    Generalized weakness  Symptomatic orthostatic hypotension  - second to ER with c/o generalized weakness / dizziness   - noted to be orthostatic on the 26   - unable to ambulate   -son requesting possible rehab placement since pt lives alone   - PT/OT consulted for opinion   - appreciate CM input     CAD  HTN  - hold all antihypertensives for now   - monitor throughout day   -Trop negative     Code Status: Full  Surrogate Decision Maker son     Body mass index is 23.4 kg/m². Code status: Full  Prophylaxis: Lovenox  Recommended Disposition: TBD     Subjective:     Chief Complaint / Reason for Physician Visit  \"I am ok lying here in bed \". Discussed with RN events overnight. Pt c/o pain in legs and difficulty with ambulation . discussed with pt possibility of going to rehab for strengthening, pt not sure , discussed poc with pt and son      Review of Systems:  Symptom Y/N Comments  Symptom Y/N Comments   Fever/Chills n   Chest Pain n    Poor Appetite n   Edema n    Cough n   Abdominal Pain n    Sputum n   Joint Pain n    SOB/AGUILERA n   Pruritis/Rash n    Nausea/vomit n   Tolerating PT/OT n    Diarrhea n   Tolerating Diet y    Constipation    Other       Could NOT obtain due to:      Objective:     VITALS:   Last 24hrs VS reviewed since prior progress note.  Most recent are:  Patient Vitals for the past 24 hrs:   Temp Pulse Resp BP SpO2   20 0742 97.9 °F (36.6 °C) 76 18 (!) 148/77 95 %   20 0318 98.7 °F (37.1 °C) 74 18 (!) 148/79 95 %   20 2348 98.3 °F (36.8 °C) 80 18 (!) 152/70 99 %   20 98.9 °F (37.2 °C) 80 18 (!) 155/73 98 %   20 1826 98.4 °F (36.9 °C) 76 18 (!) 150/93 96 %   20 1700  84 22 (!) 161/65 92 %   20 1645  80 21  100 %   20 1630  83 19  91 %   12/29/20 1615  81 18  93 %   12/29/20 1600  85 19 137/76 98 %   12/29/20 1545  79 18  98 %   12/29/20 1530  79 20  97 %   12/29/20 1517  84 22 (!) 153/69 91 %   12/29/20 1259 98.1 °F (36.7 °C) 81 16 (!) 140/64 97 %       Intake/Output Summary (Last 24 hours) at 12/30/2020 0815  Last data filed at 12/30/2020 0213  Gross per 24 hour   Intake 500 ml   Output 500 ml   Net 0 ml        I had a face to face encounter and independently examined this patient on 12/30/2020, as outlined below:  PHYSICAL EXAM:  General: Thin . Alert, cooperative, no acute distress    EENT:  EOMI. Anicteric sclerae. MMM poor dentition   Resp:   no wheezing or rales. No accessory muscle use  CV:  S1S2,  No edema  GI:  Soft, Non distended, Non tender. +Bowel sounds  Neurologic:  Alert and oriented X 3, normal speech,   Psych:   . Not anxious nor agitated  Skin:  No rashes. No jaundice Dry , flaky     Reviewed most current lab test results and cultures  YES  Reviewed most current radiology test results   YES  Review and summation of old records today    NO  Reviewed patient's current orders and MAR    YES  PMH/ reviewed - no change compared to H&P  ________________________________________________________________________  Care Plan discussed with:    Comments   Patient x    Family  x    RN     Care Manager x    Consultant                        Multidiciplinary team rounds were held today with , nursing, pharmacist and clinical coordinator. Patient's plan of care was discussed; medications were reviewed and discharge planning was addressed. ________________________________________________________________________  Total NON critical care TIME: 30   Minutes    Total CRITICAL CARE TIME Spent:   Minutes non procedure based      Comments   >50% of visit spent in counseling and coordination of care     ________________________________________________________________________  Jaimie Gaming.  Sherri May NP Procedures: see electronic medical records for all procedures/Xrays and details which were not copied into this note but were reviewed prior to creation of Plan. LABS:  I reviewed today's most current labs and imaging studies. Pertinent labs include:  Recent Labs     12/30/20  0330 12/29/20  1350   WBC 5.2 5.8   HGB 11.9 12.2   HCT 36.8 37.5    258     Recent Labs     12/30/20  0330 12/29/20  1350    138   K 3.6 3.6    107   CO2 24 26   GLU 89 86   BUN 13 15   CREA 1.00 1.24*   CA 9.0 9.1   MG 1.9  --    ALB  --  3.4*   TBILI  --  0.3   ALT  --  23       Signed: Yolette Aguillon NP

## 2020-12-30 NOTE — ED PROVIDER NOTES
EMERGENCY DEPARTMENT HISTORY AND PHYSICAL EXAM      Date: 12/29/2020  Patient Name: Deneen Kim    History of Presenting Illness     Chief Complaint   Patient presents with   • Fatigue     Seen on 12/26 dx with hypotension, med adjusments were made. Pt arrives via EMS, bp stable en route, Bilat lower extremity weakness and dizziness when standing.    • Dizziness         HPI: Deneen Kim, 86 y.o. female presents to the ED with cc of generalized weakness.  Was seen here in the emergency department 2 days ago for similar symptoms, had her blood pressure medication adjusted, however states her symptoms are not any improved.  She gets very lightheaded whenever she tries to sit up or stand, and feels like she is barely able to get around and do her activities of daily living at home.  Her son is at bedside and reports similar concerns.  She denies any unilateral weakness, numbness or tingling.  No headaches, no fevers or coughing, no chest pain or shortness of breath.  She denies any abdominal pain, vomiting or diarrhea, no dysuria or hematuria.          There are no other complaints, changes, or physical findings at this time.    PCP: Miguel Angel Florentino MD    No current facility-administered medications on file prior to encounter.      Current Outpatient Medications on File Prior to Encounter   Medication Sig Dispense Refill   • losartan (COZAAR) 25 mg tablet Take 1 Tab by mouth daily. 30 Tab 0   • diltiazem (TAZTIA XT) 300 mg SR capsule Take 300 mg by mouth daily.         Past History     Past Medical History:  Past Medical History:   Diagnosis Date   • CAD (coronary artery disease)    • Hypertension        Past Surgical History:  No past surgical history on file.    Family History:  No family history on file.    Social History:  Social History     Tobacco Use   • Smoking status: Never Smoker   • Smokeless tobacco: Never Used   Substance Use Topics   • Alcohol use: No   • Drug use: No  Allergies: Allergies   Allergen Reactions    Codeine Hives    Pcn [Penicillins] Hives    Sulfa (Sulfonamide Antibiotics) Hives         Review of Systems   no fever  No eye pain  No ear pain  no shortness of breath  no chest pain  no abdominal pain  no dysuria  no leg pain  No rash  No lymphadenopathy  No weight loss    Physical Exam   Physical Exam  Constitutional:       General: She is not in acute distress. Appearance: Normal appearance. HENT:      Head: Normocephalic and atraumatic. Nose: Nose normal.      Mouth/Throat:      Mouth: Mucous membranes are moist.   Eyes:      Extraocular Movements: Extraocular movements intact. Neck:      Musculoskeletal: Neck supple. Cardiovascular:      Rate and Rhythm: Normal rate and regular rhythm. Pulmonary:      Effort: Pulmonary effort is normal.      Breath sounds: Normal breath sounds. Abdominal:      Palpations: Abdomen is soft. Tenderness: There is no abdominal tenderness. Musculoskeletal:         General: No swelling or tenderness. Skin:     General: Skin is warm. Neurological:      General: No focal deficit present. Mental Status: She is alert and oriented to person, place, and time. Comments: Normal straight arm test bilaterally, she has 4-5 strength in bilateral lower extremities.   Sensation to light touch intact over upper and lower extremities bilaterally, symmetric facial expressions   Psychiatric:         Mood and Affect: Mood normal.         Diagnostic Study Results     Labs -     Recent Results (from the past 24 hour(s))   EKG, 12 LEAD, INITIAL    Collection Time: 12/29/20  1:05 PM   Result Value Ref Range    Ventricular Rate 74 BPM    Atrial Rate 74 BPM    P-R Interval 104 ms    QRS Duration 88 ms    Q-T Interval 364 ms    QTC Calculation (Bezet) 404 ms    Calculated P Axis 26 degrees    Calculated R Axis -27 degrees    Calculated T Axis 10 degrees    Diagnosis       Sinus rhythm with short [place]  Nonspecific ST and T wave abnormality  Confirmed by Don Avila ([id number]) on 12/29/2020 4:00:16 PM     CBC WITH AUTOMATED DIFF    Collection Time: 12/29/20  1:50 PM   Result Value Ref Range    WBC 5.8 3.6 - 11.0 K/uL    RBC 4.73 3.80 - 5.20 M/uL    HGB 12.2 11.5 - 16.0 g/dL    HCT 37.5 35.0 - 47.0 %    MCV 79.3 (L) 80.0 - 99.0 FL    MCH 25.8 (L) 26.0 - 34.0 PG    MCHC 32.5 30.0 - 36.5 g/dL    RDW 14.6 (H) 11.5 - 14.5 %    PLATELET 945 909 - 720 K/uL    MPV 10.8 8.9 - 12.9 FL    NRBC 0.0 0  WBC    ABSOLUTE NRBC 0.00 0.00 - 0.01 K/uL    NEUTROPHILS 66 32 - 75 %    LYMPHOCYTES 20 12 - 49 %    MONOCYTES 11 5 - 13 %    EOSINOPHILS 1 0 - 7 %    BASOPHILS 1 0 - 1 %    IMMATURE GRANULOCYTES 1 (H) 0.0 - 0.5 %    ABS. NEUTROPHILS 3.9 1.8 - 8.0 K/UL    ABS. LYMPHOCYTES 1.2 0.8 - 3.5 K/UL    ABS. MONOCYTES 0.6 0.0 - 1.0 K/UL    ABS. EOSINOPHILS 0.1 0.0 - 0.4 K/UL    ABS. BASOPHILS 0.0 0.0 - 0.1 K/UL    ABS. IMM. GRANS. 0.0 0.00 - 0.04 K/UL    DF AUTOMATED     METABOLIC PANEL, COMPREHENSIVE    Collection Time: 12/29/20  1:50 PM   Result Value Ref Range    Sodium 138 136 - 145 mmol/L    Potassium 3.6 3.5 - 5.1 mmol/L    Chloride 107 97 - 108 mmol/L    CO2 26 21 - 32 mmol/L    Anion gap 5 5 - 15 mmol/L    Glucose 86 65 - 100 mg/dL    BUN 15 6 - 20 MG/DL    Creatinine 1.24 (H) 0.55 - 1.02 MG/DL    BUN/Creatinine ratio 12 12 - 20      GFR est AA 50 (L) >60 ml/min/1.73m2    GFR est non-AA 41 (L) >60 ml/min/1.73m2    Calcium 9.1 8.5 - 10.1 MG/DL    Bilirubin, total 0.3 0.2 - 1.0 MG/DL    ALT (SGPT) 23 12 - 78 U/L    AST (SGOT) 25 15 - 37 U/L    Alk.  phosphatase 52 45 - 117 U/L    Protein, total 7.3 6.4 - 8.2 g/dL    Albumin 3.4 (L) 3.5 - 5.0 g/dL    Globulin 3.9 2.0 - 4.0 g/dL    A-G Ratio 0.9 (L) 1.1 - 2.2     TROPONIN I    Collection Time: 12/29/20  1:50 PM   Result Value Ref Range    Troponin-I, Qt. <0.05 <0.05 ng/mL   URINALYSIS W/ REFLEX CULTURE    Collection Time: 12/29/20  3:19 PM    Specimen: Urine   Result Value Ref Range Color YELLOW/STRAW      Appearance CLEAR CLEAR      Specific gravity 1.012 1.003 - 1.030      pH (UA) 6.0 5.0 - 8.0      Protein Negative NEG mg/dL    Glucose Negative NEG mg/dL    Ketone Negative NEG mg/dL    Bilirubin Negative NEG      Blood Negative NEG      Urobilinogen 0.2 0.2 - 1.0 EU/dL    Nitrites Negative NEG      Leukocyte Esterase Negative NEG      WBC 0-4 0 - 4 /hpf    RBC 0-5 0 - 5 /hpf    Epithelial cells FEW FEW /lpf    Bacteria Negative NEG /hpf    UA:UC IF INDICATED CULTURE NOT INDICATED BY UA RESULT CNI      Hyaline cast 0-2 0 - 5 /lpf       Radiologic Studies -   No orders to display     CT Results  (Last 48 hours)    None        CXR Results  (Last 48 hours)    None            Medical Decision Making   I am the first provider for this patient. I reviewed the vital signs, available nursing notes, past medical history, past surgical history, family history and social history. Vital Signs-Reviewed the patient's vital signs. Patient Vitals for the past 24 hrs:   Temp Pulse Resp BP SpO2   12/29/20 1826 98.4 °F (36.9 °C) 76 18 (!) 150/93 96 %   12/29/20 1700  84 22 (!) 161/65 92 %   12/29/20 1645  80 21  100 %   12/29/20 1630  83 19  91 %   12/29/20 1615  81 18  93 %   12/29/20 1600  85 19 137/76 98 %   12/29/20 1545  79 18  98 %   12/29/20 1530  79 20  97 %   12/29/20 1517  84 22 (!) 153/69 91 %   12/29/20 1259 98.1 °F (36.7 °C) 81 16 (!) 140/64 97 %         Provider Notes (Medical Decision Making):   59-year-old female presenting with generalized weakness. Concern for dehydration, electrolyte/metabolic abnormalities, orthostatic hypotension, dysrhythmia, failure to thrive. ED Course:     Initial assessment performed. The patients presenting problems have been discussed, and they are in agreement with the care plan formulated and outlined with them. I have encouraged them to ask questions as they arise throughout their visit.     CBC negative for leukocytosis or anemia, UA negative for UTI basic metabolic panel shows creatinine elevated at 1.24 without worrisome electrolyte abnormalities. EKG is performed at 13: 05, shows sinus rhythm at a rate of 74, , QRS 88, QTc 4 4, no ST segment elevation or depression concerning for ACS, is interpreted as normal sinus rhythm. On reevaluation, the patient is resting comfortably, I attempted to stand up and ambulate the patient, however she required significant assistance, reports lightheadedness as this is performed. I spoken with her son at length and have had care management assess the patient. He states that he does not believe that she is safe to go home, she is not able to complete her ADLs, and they are interested in inpatient rehabilitation facility. Do not believe that home health will be enough to help care for her. Care management states that this will require inpatient admission for PT and OT evaluation. Critical Care Time:         Disposition:  Admit    PLAN:  1. Current Discharge Medication List        2.    Follow-up Information     Follow up With Specialties Details Why Contact Info    Jose C Ross MD Family Medicine Call in 1 day  [address] [place] [address]  [phone]      \[facility]\"" EMERGENCY DEPT Emergency Medicine  As needed, If symptoms worsen [address] [place] [address]  [phone]        Return to ED if worse     Diagnosis     Clinical Impression: Acute generalized weakness and lightheadedness

## 2020-12-30 NOTE — PROGRESS NOTES
Problem: Mobility Impaired (Adult and Pediatric)  Goal: *Acute Goals and Plan of Care (Insert Text)  Description: FUNCTIONAL STATUS PRIOR TO ADMISSION: Pt reports ambulation with use of RW. Denies history of falls. Reports increased weakness/difficulty caring for herself over previous 1 month. Pt reports x1 month of increased dizziness with mobility, stating she was mostly spending the day in bed as a result. HOME SUPPORT PRIOR TO ADMISSION: The patient lives alone. Son lives in the area and assists as needed. Pt reports she previously had caregiver 5 days/wk for 5 hrs however caregiver has stopped coming to her home. Physical Therapy Goals  Initiated 12/30/2020  1. Patient will move from supine to sit and sit to supine , scoot up and down, and roll side to side in bed with supervision/set-up within 7 day(s). 2.  Patient will transfer from bed to chair and chair to bed with supervision/set-up using the least restrictive device within 7 day(s). 3.  Patient will perform sit to stand with supervision/set-up within 7 day(s). 4.  Patient will ambulate with contact guard assist for 25 feet with the least restrictive device within 7 day(s). Outcome: Progressing Towards Goal   PHYSICAL THERAPY EVALUATION  Patient: Samson Briggs (80 y.o. female)  Date: 12/30/2020  Primary Diagnosis: Generalized weakness [R53.1]        Precautions:        ASSESSMENT  Based on the objective data described below, the patient presents with increased weakness, orthostatic hypotension, decreased endurance/activity tolerance, altered gait pattern, impaired balance, and overall impaired functional mobility. Pt + for orthostatic hypotension during mobility however BP stabilized post activity w pt in bedside chair. Pt denied c/o dizziness/lightheadedness however continually stated \"I just don't feel 100%. \" Pt required modAx1 during sit>>stand transfer as well as minAx2 with use of RW during brief ambulation trial. Pt fatigued quickly, only able to tolerate ambulation to bedside chair before fatigue. Pt remains a large falls risk and not safe to return home alone. Recommend additional rehab at discharge. Current Level of Function Impacting Discharge (mobility/balance): maxA x2 for bed mobility, modAx1 for sit<>stand transfer, minAx2 with use of RW during ambulation    Functional Outcome Measure: The patient scored 35/100 on the Barthel Index outcome measure which is indicative of significant impairment in ADLs and functional mobility. Other factors to consider for discharge: lives alone, decline in functional status, falls risk     Patient will benefit from skilled therapy intervention to address the above noted impairments. PLAN :  Recommendations and Planned Interventions: bed mobility training, transfer training, gait training, therapeutic exercises, patient and family training/education, and therapeutic activities      Frequency/Duration: Patient will be followed by physical therapy:  4 times a week to address goals. Recommendation for discharge: (in order for the patient to meet his/her long term goals)  Therapy up to 5 days/week in SNF setting    This discharge recommendation:  Has been made in collaboration with the attending provider and/or case management    IF patient discharges home will need the following DME: to be determined (TBD)         SUBJECTIVE:   Patient stated I just don't feel 100%.     OBJECTIVE DATA SUMMARY:   HISTORY:    Past Medical History:   Diagnosis Date    CAD (coronary artery disease)     Hypertension    No past surgical history on file.     Personal factors and/or comorbidities impacting plan of care:     Home Situation  Home Environment: Private residence  # Steps to Enter: 0  One/Two Story Residence: One story  Living Alone: Yes  Support Systems: Child(jorden)  Patient Expects to be Discharged to[de-identified] Rehabilitation facility  Current DME Used/Available at Home: Wheelchair, [address], rolling, Shower chair, Sony Keo, straight    EXAMINATION/PRESENTATION/DECISION MAKING:   Critical Behavior:  Neurologic State: Alert, Eyes open spontaneously  Orientation Level: Oriented X4  Cognition: Follows commands     Hearing: Auditory  Auditory Impairment: Hard of hearing, bilateral  Skin:  intact  Edema: none noted   Range Of Motion:  AROM: Generally decreased, functional                       Strength:    Strength: Generally decreased, functional                    Tone & Sensation:   Tone: Normal                              Coordination:  Coordination: Within functional limits  Vision:      Functional Mobility:  Bed Mobility:  Rolling: Maximum assistance;Assist x2  Supine to Sit: Maximum assistance;Assist x2     Scooting: Stand-by assistance  Transfers:  Sit to Stand: Moderate assistance;Assist x1  Stand to Sit: Minimum assistance        Bed to Chair: Minimum assistance;Assist x2              Balance:   Sitting: Impaired  Sitting - Static: Good (unsupported)  Sitting - Dynamic: Fair (occasional)  Standing: Impaired; With support(RW)  Standing - Static: Fair;Constant support  Standing - Dynamic : Fair;Constant support  Ambulation/Gait Training:  Distance (ft): 3 Feet (ft)  Assistive Device: Walker, rolling;Gait belt  Ambulation - Level of Assistance: Minimal assistance;Assist x2        Gait Abnormalities: Decreased step clearance;Shuffling gait;Trunk sway increased        Base of Support: Narrowed     Speed/Orseline: Slow;Shuffled  Step Length: Right shortened;Left shortened                  Functional Measure:  Barthel Index:    Bathin  Bladder: 5  Bowels: 5  Groomin  Dressin  Feeding: 10  Mobility: 0  Stairs: 0  Toilet Use: 5  Transfer (Bed to Chair and Back): 5  Total: 35/100       The Barthel ADL Index: Guidelines  1. The index should be used as a record of what a patient does, not as a record of what a patient could do.   2. The main aim is to establish degree of independence from any help, physical or verbal, however minor and for whatever reason. 3. The need for supervision renders the patient not independent. 4. A patient's performance should be established using the best available evidence. Asking the patient, friends/relatives and nurses are the usual sources, but direct observation and common sense are also important. However direct testing is not needed. 5. Usually the patient's performance over the preceding 24-48 hours is important, but occasionally longer periods will be relevant. 6. Middle categories imply that the patient supplies over 50 per cent of the effort. 7. Use of aids to be independent is allowed. Jamie Milner., Barthel, DThaddeusW. (0529). Functional evaluation: the Barthel Index. [facility] (14)2. [place] Garces, Yannick Abreu., Kar Méndez., Clarissa, [address] (1999). Measuring the change indisability after inpatient rehabilitation; comparison of the responsiveness of the Barthel Index and Functional [place] Measure. Journal of Neurology, Neurosurgery, and Psychiatry, 66(4), 738-691. Oral AGUSTIN Damon.KM, KONRAD Rajan, & Aleks Leihg, MThaddeusA. (2004.) Assessment of post-stroke quality of life in cost-effectiveness studies: The usefulness of the Barthel Index and the EuroQoL-5D.  Quality of Life Research, 15, 986-66           Physical Therapy Evaluation Charge Determination   History Examination Presentation Decision-Making   MEDIUM  Complexity : 1-2 comorbidities / personal factors will impact the outcome/ POC  MEDIUM Complexity : 3 Standardized tests and measures addressing body structure, function, activity limitation and / or participation in recreation  MEDIUM Complexity : Evolving with changing characteristics  MEDIUM Complexity : FOTO score of 26-74      Based on the above components, the patient evaluation is determined to be of the following complexity level: MEDIUM    Pain Rating:  Denied complaints of pain    Activity Tolerance:   signs and symptoms of orthostatic hypotension    After treatment patient left in no apparent distress:   Sitting in chair, Call bell within reach, and Caregiver / family present    COMMUNICATION/EDUCATION:   The patients plan of care was discussed with: Registered nurse and Case management. Fall prevention education was provided and the patient/caregiver indicated understanding., Patient/family have participated as able in goal setting and plan of care. , and Patient/family agree to work toward stated goals and plan of care.     Thank you for this referral.  Mae Brown, PT, DPT   Time Calculation: 20 mins

## 2020-12-30 NOTE — PROGRESS NOTES
Problem: Falls - Risk of  Goal: *Absence of Falls  Description: Document Miya Benjamin Fall Risk and appropriate interventions in the flowsheet. Outcome: Progressing Towards Goal  Note: Fall Risk Interventions:  Mobility Interventions: Patient to call before getting OOB, PT Consult for mobility concerns, Strengthening exercises (ROM-active/passive)              Elimination Interventions: Bed/chair exit alarm              Problem: Patient Education: Go to Patient Education Activity  Goal: Patient/Family Education  Outcome: Progressing Towards Goal     Problem: Pressure Injury - Risk of  Goal: *Prevention of pressure injury  Description: Document Cordell Scale and appropriate interventions in the flowsheet.   Outcome: Progressing Towards Goal  Note: Pressure Injury Interventions:  Sensory Interventions: Assess changes in LOC, Assess need for specialty bed, Avoid rigorous massage over bony prominences, Chair cushion, Check visual cues for pain, Discuss PT/OT consult with provider, Float heels, Keep linens dry and wrinkle-free, Maintain/enhance activity level, Minimize linen layers, Pressure redistribution bed/mattress (bed type), Monitor skin under medical devices, Pad between skin to skin    Moisture Interventions: Absorbent underpads, Internal/External urinary devices    Activity Interventions: Increase time out of bed, PT/OT evaluation    Mobility Interventions: PT/OT evaluation, HOB 30 degrees or less    Nutrition Interventions: Document food/fluid/supplement intake, Discuss nutritional consult with provider, Offer support with meals,snacks and hydration    Friction and Shear Interventions: Apply protective barrier, creams and emollients, HOB 30 degrees or less                Problem: Patient Education: Go to Patient Education Activity  Goal: Patient/Family Education  Outcome: Progressing Towards Goal

## 2020-12-31 PROBLEM — I95.1 ORTHOSTATIC HYPOTENSION: Status: ACTIVE | Noted: 2020-12-31

## 2020-12-31 LAB
25(OH)D3 SERPL-MCNC: 68 NG/ML (ref 30–100)
ALBUMIN SERPL-MCNC: 3.2 G/DL (ref 3.5–5)
ALBUMIN/GLOB SERPL: 0.8 {RATIO} (ref 1.1–2.2)
ALP SERPL-CCNC: 55 U/L (ref 45–117)
ALT SERPL-CCNC: 28 U/L (ref 12–78)
ANION GAP SERPL CALC-SCNC: 6 MMOL/L (ref 5–15)
APTT PPP: 38 SEC (ref 22.1–31)
AST SERPL-CCNC: 32 U/L (ref 15–37)
BASOPHILS # BLD: 0 K/UL (ref 0–0.1)
BASOPHILS NFR BLD: 0 % (ref 0–1)
BILIRUB SERPL-MCNC: 0.5 MG/DL (ref 0.2–1)
BUN SERPL-MCNC: 15 MG/DL (ref 6–20)
BUN/CREAT SERPL: 13 (ref 12–20)
CALCIUM SERPL-MCNC: 9 MG/DL (ref 8.5–10.1)
CHLORIDE SERPL-SCNC: 105 MMOL/L (ref 97–108)
CO2 SERPL-SCNC: 24 MMOL/L (ref 21–32)
CREAT SERPL-MCNC: 1.18 MG/DL (ref 0.55–1.02)
D DIMER PPP FEU-MCNC: 0.99 MG/L FEU (ref 0–0.65)
DIFFERENTIAL METHOD BLD: ABNORMAL
EOSINOPHIL # BLD: 0.1 K/UL (ref 0–0.4)
EOSINOPHIL NFR BLD: 1 % (ref 0–7)
ERYTHROCYTE [DISTWIDTH] IN BLOOD BY AUTOMATED COUNT: 14.5 % (ref 11.5–14.5)
FIBRINOGEN PPP-MCNC: 533 MG/DL (ref 200–475)
GLOBULIN SER CALC-MCNC: 4 G/DL (ref 2–4)
GLUCOSE SERPL-MCNC: 83 MG/DL (ref 65–100)
HCT VFR BLD AUTO: 37.2 % (ref 35–47)
HEALTH STATUS, XMCV2T: ABNORMAL
HGB BLD-MCNC: 12.1 G/DL (ref 11.5–16)
IMM GRANULOCYTES # BLD AUTO: 0 K/UL (ref 0–0.04)
IMM GRANULOCYTES NFR BLD AUTO: 0 % (ref 0–0.5)
INR PPP: 1 (ref 0.9–1.1)
LDH SERPL L TO P-CCNC: 278 U/L (ref 81–246)
LYMPHOCYTES # BLD: 0.4 K/UL (ref 0.8–3.5)
LYMPHOCYTES NFR BLD: 6 % (ref 12–49)
MAGNESIUM SERPL-MCNC: 1.9 MG/DL (ref 1.6–2.4)
MCH RBC QN AUTO: 24.9 PG (ref 26–34)
MCHC RBC AUTO-ENTMCNC: 32.5 G/DL (ref 30–36.5)
MCV RBC AUTO: 76.7 FL (ref 80–99)
MONOCYTES # BLD: 0.3 K/UL (ref 0–1)
MONOCYTES NFR BLD: 4 % (ref 5–13)
NEUTS BAND NFR BLD MANUAL: 6 %
NEUTS SEG # BLD: 5.6 K/UL (ref 1.8–8)
NEUTS SEG NFR BLD: 83 % (ref 32–75)
NRBC # BLD: 0 K/UL (ref 0–0.01)
NRBC BLD-RTO: 0 PER 100 WBC
PLATELET # BLD AUTO: 279 K/UL (ref 150–400)
PMV BLD AUTO: 10.4 FL (ref 8.9–12.9)
POTASSIUM SERPL-SCNC: 3.9 MMOL/L (ref 3.5–5.1)
PROCALCITONIN SERPL-MCNC: <0.05 NG/ML
PROT SERPL-MCNC: 7.2 G/DL (ref 6.4–8.2)
PROTHROMBIN TIME: 10.8 SEC (ref 9–11.1)
RBC # BLD AUTO: 4.85 M/UL (ref 3.8–5.2)
RBC MORPH BLD: ABNORMAL
SARS-COV-2, COV2: DETECTED
SODIUM SERPL-SCNC: 135 MMOL/L (ref 136–145)
SOURCE, COVRS: ABNORMAL
SPECIMEN SOURCE, FCOV2M: ABNORMAL
SPECIMEN TYPE, XMCV1T: ABNORMAL
THERAPEUTIC RANGE,PTTT: ABNORMAL SECS (ref 58–77)
TROPONIN I SERPL-MCNC: <0.05 NG/ML
WBC # BLD AUTO: 6.4 K/UL (ref 3.6–11)

## 2020-12-31 PROCEDURE — 85384 FIBRINOGEN ACTIVITY: CPT

## 2020-12-31 PROCEDURE — 85379 FIBRIN DEGRADATION QUANT: CPT

## 2020-12-31 PROCEDURE — 84484 ASSAY OF TROPONIN QUANT: CPT

## 2020-12-31 PROCEDURE — 65660000000 HC RM CCU STEPDOWN

## 2020-12-31 PROCEDURE — 87449 NOS EACH ORGANISM AG IA: CPT

## 2020-12-31 PROCEDURE — 85730 THROMBOPLASTIN TIME PARTIAL: CPT

## 2020-12-31 PROCEDURE — 74011250636 HC RX REV CODE- 250/636: Performed by: NURSE PRACTITIONER

## 2020-12-31 PROCEDURE — 74011250637 HC RX REV CODE- 250/637: Performed by: NURSE PRACTITIONER

## 2020-12-31 PROCEDURE — 99218 HC RM OBSERVATION: CPT

## 2020-12-31 PROCEDURE — 83615 LACTATE (LD) (LDH) ENZYME: CPT

## 2020-12-31 PROCEDURE — 84145 PROCALCITONIN (PCT): CPT

## 2020-12-31 PROCEDURE — 97535 SELF CARE MNGMENT TRAINING: CPT

## 2020-12-31 PROCEDURE — 74011250636 HC RX REV CODE- 250/636: Performed by: GENERAL ACUTE CARE HOSPITAL

## 2020-12-31 PROCEDURE — 87899 AGENT NOS ASSAY W/OPTIC: CPT

## 2020-12-31 PROCEDURE — 80053 COMPREHEN METABOLIC PANEL: CPT

## 2020-12-31 PROCEDURE — 96372 THER/PROPH/DIAG INJ SC/IM: CPT

## 2020-12-31 PROCEDURE — 97116 GAIT TRAINING THERAPY: CPT

## 2020-12-31 PROCEDURE — 86140 C-REACTIVE PROTEIN: CPT

## 2020-12-31 PROCEDURE — 85610 PROTHROMBIN TIME: CPT

## 2020-12-31 PROCEDURE — 82306 VITAMIN D 25 HYDROXY: CPT

## 2020-12-31 PROCEDURE — 85025 COMPLETE CBC W/AUTO DIFF WBC: CPT

## 2020-12-31 PROCEDURE — 82728 ASSAY OF FERRITIN: CPT

## 2020-12-31 PROCEDURE — 83735 ASSAY OF MAGNESIUM: CPT

## 2020-12-31 RX ORDER — ASCORBIC ACID 500 MG
500 TABLET ORAL 2 TIMES DAILY
Status: COMPLETED | OUTPATIENT
Start: 2020-12-31 | End: 2021-01-05

## 2020-12-31 RX ORDER — ZINC SULFATE 50(220)MG
1 CAPSULE ORAL EVERY 12 HOURS
Status: COMPLETED | OUTPATIENT
Start: 2020-12-31 | End: 2021-01-05

## 2020-12-31 RX ORDER — ACETAMINOPHEN 325 MG/1
650 TABLET ORAL
Status: DISCONTINUED | OUTPATIENT
Start: 2020-12-31 | End: 2021-01-07 | Stop reason: HOSPADM

## 2020-12-31 RX ORDER — DEXAMETHASONE 4 MG/1
6 TABLET ORAL DAILY
Status: DISCONTINUED | OUTPATIENT
Start: 2020-12-31 | End: 2021-01-07 | Stop reason: HOSPADM

## 2020-12-31 RX ORDER — DILTIAZEM HYDROCHLORIDE 300 MG/1
300 CAPSULE, COATED, EXTENDED RELEASE ORAL DAILY
Status: DISCONTINUED | OUTPATIENT
Start: 2020-12-31 | End: 2021-01-03

## 2020-12-31 RX ORDER — GUAIFENESIN/DEXTROMETHORPHAN 100-10MG/5
5 SYRUP ORAL
Status: DISCONTINUED | OUTPATIENT
Start: 2020-12-31 | End: 2021-01-07 | Stop reason: HOSPADM

## 2020-12-31 RX ORDER — ENOXAPARIN SODIUM 100 MG/ML
30 INJECTION SUBCUTANEOUS EVERY 12 HOURS
Status: DISCONTINUED | OUTPATIENT
Start: 2020-12-31 | End: 2021-01-03

## 2020-12-31 RX ORDER — ACETAMINOPHEN 650 MG/1
650 SUPPOSITORY RECTAL
Status: DISCONTINUED | OUTPATIENT
Start: 2020-12-31 | End: 2021-01-07 | Stop reason: HOSPADM

## 2020-12-31 RX ORDER — ZINC SULFATE 50(220)MG
1 CAPSULE ORAL DAILY
Status: DISCONTINUED | OUTPATIENT
Start: 2020-12-31 | End: 2020-12-31

## 2020-12-31 RX ORDER — MELATONIN
2000 DAILY
Status: DISCONTINUED | OUTPATIENT
Start: 2020-12-31 | End: 2021-01-07 | Stop reason: HOSPADM

## 2020-12-31 RX ORDER — ASCORBIC ACID 500 MG
500 TABLET ORAL DAILY
Status: DISCONTINUED | OUTPATIENT
Start: 2020-12-31 | End: 2020-12-31

## 2020-12-31 RX ADMIN — ACETAMINOPHEN 650 MG: 325 TABLET ORAL at 20:35

## 2020-12-31 RX ADMIN — Medication 10 ML: at 13:33

## 2020-12-31 RX ADMIN — Medication 1 CAPSULE: at 11:00

## 2020-12-31 RX ADMIN — DILTIAZEM HYDROCHLORIDE 300 MG: 300 CAPSULE, COATED, EXTENDED RELEASE ORAL at 14:54

## 2020-12-31 RX ADMIN — Medication 10 ML: at 20:36

## 2020-12-31 RX ADMIN — Medication 10 ML: at 05:12

## 2020-12-31 RX ADMIN — IBUPROFEN 400 MG: 400 TABLET, FILM COATED ORAL at 20:35

## 2020-12-31 RX ADMIN — Medication 1 LOZENGE: at 08:33

## 2020-12-31 RX ADMIN — GUAIFENESIN AND DEXTROMETHORPHAN 5 ML: 100; 10 SYRUP ORAL at 20:35

## 2020-12-31 RX ADMIN — ENOXAPARIN SODIUM 30 MG: 30 INJECTION SUBCUTANEOUS at 20:35

## 2020-12-31 RX ADMIN — ZINC SULFATE 220 MG (50 MG) CAPSULE 1 CAPSULE: CAPSULE at 20:35

## 2020-12-31 RX ADMIN — CHOLECALCIFEROL TAB 25 MCG (1000 UNIT) 2 TABLET: 25 TAB at 11:00

## 2020-12-31 RX ADMIN — ENOXAPARIN SODIUM 40 MG: 40 INJECTION SUBCUTANEOUS at 08:29

## 2020-12-31 RX ADMIN — OXYCODONE HYDROCHLORIDE AND ACETAMINOPHEN 500 MG: 500 TABLET ORAL at 11:00

## 2020-12-31 RX ADMIN — OXYCODONE HYDROCHLORIDE AND ACETAMINOPHEN 500 MG: 500 TABLET ORAL at 18:02

## 2020-12-31 RX ADMIN — DEXAMETHASONE 6 MG: 4 TABLET ORAL at 10:00

## 2020-12-31 NOTE — PROGRESS NOTES
Problem: Falls - Risk of  Goal: *Absence of Falls  Description: Document Miya Benjamin Fall Risk and appropriate interventions in the flowsheet. Outcome: Progressing Towards Goal  Note: Fall Risk Interventions:  Mobility Interventions: Bed/chair exit alarm, Communicate number of staff needed for ambulation/transfer, Patient to call before getting OOB              Elimination Interventions: Bed/chair exit alarm, Call light in reach, Patient to call for help with toileting needs, Toileting schedule/hourly rounds              Problem: Patient Education: Go to Patient Education Activity  Goal: Patient/Family Education  Outcome: Progressing Towards Goal     Problem: Pressure Injury - Risk of  Goal: *Prevention of pressure injury  Description: Document Cordell Scale and appropriate interventions in the flowsheet.   Outcome: Progressing Towards Goal  Note: Pressure Injury Interventions:  Sensory Interventions: Assess changes in LOC, Assess need for specialty bed, Avoid rigorous massage over bony prominences, Chair cushion, Check visual cues for pain, Discuss PT/OT consult with provider, Float heels, Keep linens dry and wrinkle-free, Maintain/enhance activity level, Minimize linen layers, Pressure redistribution bed/mattress (bed type), Monitor skin under medical devices, Pad between skin to skin    Moisture Interventions: Absorbent underpads, Apply protective barrier, creams and emollients    Activity Interventions: Increase time out of bed    Mobility Interventions: Float heels, HOB 30 degrees or less    Nutrition Interventions: Document food/fluid/supplement intake    Friction and Shear Interventions: Apply protective barrier, creams and emollients, HOB 30 degrees or less, Minimize layers                Problem: Patient Education: Go to Patient Education Activity  Goal: Patient/Family Education  Outcome: Progressing Towards Goal     Problem: Patient Education: Go to Patient Education Activity  Goal: Patient/Family Education  Outcome: Progressing Towards Goal     Problem: Patient Education: Go to Patient Education Activity  Goal: Patient/Family Education  Outcome: Progressing Towards Goal

## 2020-12-31 NOTE — PROGRESS NOTES
Problem: Self Care Deficits Care Plan (Adult)  Goal: *Acute Goals and Plan of Care (Insert Text)  Description:   FUNCTIONAL STATUS PRIOR TO ADMISSION:Pt reports ambulation with use of RW. Denies history of falls. Reports increased weakness/difficulty caring for herself over previous 1 month. Pt reports x1 month of increased dizziness with mobility, stating she was mostly spending the day in bed as a result. Admitted to ER for same symptoms 12-26; meds changed, went home and continued to feel worse. HOME SUPPORT PRIOR TO ADMISSION: The patient lives alone. Son lives in the area and assists as needed. Pt reports she previously had caregiver 5 days/wk for 5 hrs however caregiver has stopped coming to her home. Occupational Therapy Goals  Initiated 12/30/2020  1. Patient will perform standing grooming with supervision/set-up within 7 day(s). 2.  Patient will perform lower body dressing with minimal assistance/contact guard assist within 7 day(s). 3.  Patient will perform bathing with minimal assistance/contact guard assist within 7 day(s). 4.  Patient will perform toilet transfers with minimal assistance/contact guard assist within 7 day(s). 5.  Patient will perform all aspects of toileting with minimal assistance/contact guard assist within 7 day(s). 6.  Patient will participate in upper extremity therapeutic exercise/activities with supervision/set-up for 5 minutes within 7 day(s). 7.  Patient will utilize energy conservation, pain management, fall prevention and hypotension safety precaution techniques during functional activities with verbal and visual cues within 7 day(s). Outcome: Progressing Towards Goal   OCCUPATIONAL THERAPY TREATMENT  Patient:  Alex  (80 y.o. female)  Date: 12/31/2020  Diagnosis: Generalized weakness [R53.1] <principal problem not specified>      Precautions: Fall, droplet plus(new)  Chart, occupational therapy assessment, plan of care, and goals were reviewed. ASSESSMENT  Patient continues with skilled OT services and is progressing towards goals. Nursing cleared for therapy. Patient received in bed and agreeable to therapy. Demonstrating increased functional mobility however limited standing tolerance with bathing tasks. She reports feeling of weakness but denies dizziness, decrease in SBP in standing by approx 30 points that rebounded in sitting. Continue to recommend SNF for additional rehab prior to dc home. Following session, nursing alerted therapists that patient with positive COVID test.  (Patient had been awaiting testing for dc plans)    Current Level of Function Impacting Discharge (ADLs): Bathing in sitting with set up for UB and min A for LB and min A x2 SPT     Other factors to consider for discharge: Patient lives at home alone, some assistance from son. PLAN :  Patient continues to benefit from skilled intervention to address the above impairments. Continue treatment per established plan of care. to address goals. Recommend with staff: min A at RW level SPT    Recommend next OT session: standing ADLs    Recommendation for discharge: (in order for the patient to meet his/her long term goals)  Therapy up to 5 days/week in SNF setting    This discharge recommendation:  Has been made in collaboration with the attending provider and/or case management    IF patient discharges home will need the following DME: TBD SNF       SUBJECTIVE:   Patient stated I am weak.     OBJECTIVE DATA SUMMARY:   Cognitive/Behavioral Status:          Functional Mobility and Transfers for ADLs:  Bed Mobility:  Supine to Sit: Minimum assistance;Assist x2;Bed ModifiedMin A x2 for final scooting with HOB elevated    Transfers:  Sit to Stand: Minimum assistance;Assist x2Min A x2 at RW level      Balance:  Sitting: Intact  Sitting - Static: Good (unsupported)  Sitting - Dynamic: Good (unsupported)  Standing: Impaired; With support  Standing - Static: Fair;Constant support  Standing - Dynamic : Fair;Constant supportSitting: intact  Standing: UE support on RW- fair      ADL Intervention:   Patient instructed and indicated understanding the benefits of maintaining activity tolerance, functional mobility, and independence with self care tasks during acute stay  to ensure safe return home and to baseline. Encouraged patient to increase frequency and duration OOB, be out of bed for all meals, perform daily ADLs (as approved by RN/MD regarding bathing etc), and performing functional mobility to/from bathroom. Provided education through multi-modal cues for RW safety including proper positioning, hand placement,  and safety. Patient able to perform sit <> stand with min  assistance. Fair understanding of RW use and safety. Upper Body Bathing  Bathing Assistance: Set-up  Position Performed: Seated in chair    Lower Body Bathing  Perineal  : Total assistance (dependent)(standing)  Position Performed: Standing  Lower Body : Minimum assistance  Position Performed: Seated in chair    Upper [address]: Stand-by assistance       Pain:  No c/o pain    Activity Tolerance:   Fair and signs and symptoms of orthostatic hypotension    After treatment patient left in no apparent distress:   Sitting in chair and Call bell within reach    COMMUNICATION/COLLABORATION:   The patients plan of care was discussed with: Physical therapist and Registered nurse.      Adelaida Rodriguez OT  Time Calculation: 24 mins

## 2020-12-31 NOTE — PROGRESS NOTES
Bedside and Verbal shift change report given to [address] (oncoming nurse) by Marisabel Paul (offgoing nurse). Report included the following information SBAR, Kardex, Intake/Output, MAR, Accordion and Recent Results.

## 2020-12-31 NOTE — PROGRESS NOTES
End of Shift Note    Bedside shift change report given to [facility] (oncoming nurse) by Louise Chahal (offgoing nurse). Report included the following information SBAR, Kardex, ED Summary, Intake/Output, MAR and Recent Results    Shift worked:  4149-0724     Shift summary and any significant changes:     Pt tolerated all aspects of care. Pt stated she was comfortable and had no complaints of dizziness or discomfort throughout shift. Pt has no concerns at this time. Will continue to monitor. Concerns for physician to address:  none     Zone phone for oncoming shift:   [phone]       Activity:  Activity Level: Up with Assistance  Number times ambulated in hallways past shift: 0  Number of times OOB to chair past shift: 0    Cardiac:   Cardiac Monitoring: No      Cardiac Rhythm: Normal sinus rhythm    Access:   Current line(s): PIV     Genitourinary:   Urinary status: external catheter    Respiratory:   O2 Device: Room air  Chronic home O2 use?: NO  Incentive spirometer at bedside: YES     GI:     Current diet:  DIET GI LITE (POST SURGICAL)  Passing flatus: YES  Tolerating current diet: YES  % Diet Eaten: 50 %    Pain Management:   Patient states pain is manageable on current regimen: YES    Skin:  Cordell Score: 18  Interventions: float heels and increase time out of bed    Patient Safety:  Fall Score:  Total Score: 2  Interventions: bed/chair alarm, assistive device (walker, cane, etc), gripper socks, pt to call before getting OOB and stay with me (per policy)  High Fall Risk: Yes    Length of Stay:  Expected LOS: - - -  Actual LOS: [facility]

## 2020-12-31 NOTE — PROGRESS NOTES
TRANSFER - OUT REPORT:    Verbal report given to RN (name) on [facility] [address]  being transferred to Ortho (unit) for routine progression of care   (covid positive)    Report consisted of patients Situation, Background, Assessment and   Recommendations(SBAR). Information from the following report(s) SBAR, Kardex, Intake/Output, MAR and Recent Results was reviewed with the receiving nurse. Lines:   Peripheral IV 12/29/20 Left;Mid Arm (Active)   Site Assessment Clean, dry, & intact 12/31/20 0828   Phlebitis Assessment 0 12/31/20 0828   Infiltration Assessment 0 12/31/20 0828   Dressing Status Clean, dry, & intact 12/31/20 0828   Dressing Type Tape;Transparent 12/31/20 0828   Hub Color/Line Status Blue;Patent; Flushed 12/31/20 0743   Action Taken Open ports on tubing capped 12/31/20 0828   Alcohol Cap Used Yes 12/31/20 9745        Opportunity for questions and clarification was provided.       Patient transported with:   Registered Nurse  Tech

## 2020-12-31 NOTE — PROGRESS NOTES
I reviewed pertinent labs and imaging, and discussed /agreed on the plan of care with Dr. Alisha Stanley. Hospitalist Progress Note    NAME: Abhijit Curtis   :  1934   MRN:  [id number]       Assessment / Plan:    COVID positive   -asymptomatic screening for SNF placement  - pt asymptomatic - has dry cough , no fever ,on room air   - Pt positive , COVID labs done    check LFT  CRP Ferritin LDH D-dimer Procalcitonin Troponin  Vitamin C 500mg po BID ,Zinc 220mg PO BID  - transfer off unit to Covid unit   - discussion with son Isma Fofana of dx and urge to get tested also  - cont to monitor  - will need Neg Covid test for SNF placement     Generalized weakness  Symptomatic orthostatic hypotension  - second visit  to ER with c/o generalized weakness / dizziness   - noted to be orthostatic on the    - unable to ambulate at home   -son requesting possible rehab placement since pt lives alone   - PT/OT consulted for opinion   - appreciate CM input     CAD  HTN  - Sbp 130-160/71  - resume diltiazem 300 mg today   Cont to hold [place] for Cr. 1.018  - monitor throughout day   -Trop negative     Code Status: Full  Surrogate Decision Maker son     Body mass index is 23.4 kg/m². Code status: Full  Prophylaxis: Lovenox  Recommended Disposition: TBD     Subjective:     Chief Complaint / Reason for Physician Visit  \"I am still feeling weak  \". Discussed with RN events overnight. Pt made aware of Covid positive diagnosis although asymptomatic   Review of Systems:  Symptom Y/N Comments  Symptom Y/N Comments   Fever/Chills n   Chest Pain n    Poor Appetite n   Edema n    Cough y dry  Abdominal Pain n    Sputum n   Joint Pain n    SOB/AGUILERA n   Pruritis/Rash n    Nausea/vomit n   Tolerating PT/OT n    Diarrhea n   Tolerating Diet y    Constipation    Other       Could NOT obtain due to:      Objective:     VITALS:   Last 24hrs VS reviewed since prior progress note.  Most recent are:  Patient Vitals for the past 24 hrs:   Temp Pulse Resp BP SpO2   12/31/20 1300 99.9 °F (37.7 °C) 83 20 (!) 167/71 97 %   12/31/20 0848 97.5 °F (36.4 °C) 80 16 (!) 130/53 98 %   12/31/20 0335 98.6 °F (37 °C) 70 17 119/70 98 %   12/30/20 2329 98.6 °F (37 °C) 64 17 118/61 96 %   12/30/20 2008 98.7 °F (37.1 °C) 66 16 (!) 119/52 95 %   12/30/20 1517 99.6 °F (37.6 °C) 74 16 133/76 96 %       Intake/Output Summary (Last 24 hours) at 12/31/2020 1405  Last data filed at 12/30/2020 2010  Gross per 24 hour   Intake 120 ml   Output 850 ml   Net -730 ml        I had a face to face encounter and independently examined this patient on 12/31/2020, as outlined below:  PHYSICAL EXAM:  General: Thin . Alert, cooperative, no acute distress    EENT:  EOMI. Anicteric sclerae. MMM poor dentition   Resp:   no wheezing or rales. No accessory muscle use, room air   CV:  S1S2,  No edema  GI:  Soft, Non distended, Non tender. +Bowel sounds  Neurologic:  Alert and oriented X 3, normal speech,   Psych:   . Not anxious nor agitated  Skin:  No rashes. No jaundice Dry , flaky     Reviewed most current lab test results and cultures  YES  Reviewed most current radiology test results   YES  Review and summation of old records today    NO  Reviewed patient's current orders and MAR    YES  PMH/ reviewed - no change compared to H&P  ________________________________________________________________________  Care Plan discussed with:    Comments   Patient x    Family  x    RN     Care Manager x    Consultant                        Multidiciplinary team rounds were held today with , nursing, pharmacist and clinical coordinator. Patient's plan of care was discussed; medications were reviewed and discharge planning was addressed.      ________________________________________________________________________  Total NON critical care TIME: 30   Minutes    Total CRITICAL CARE TIME Spent:   Minutes non procedure based      Comments   >50% of visit spent in counseling and coordination of care     ________________________________________________________________________  Jaimie Gaming. Sherri May NP     Procedures: see electronic medical records for all procedures/Xrays and details which were not copied into this note but were reviewed prior to creation of Plan. LABS:  I reviewed today's most current labs and imaging studies. Pertinent labs include:  Recent Labs     12/31/20  1120 12/30/20  0330 12/29/20  1350   WBC 6.4 5.2 5.8   HGB 12.1 11.9 12.2   HCT 37.2 36.8 37.5    253 258     Recent Labs     12/31/20  1120 12/30/20  0330 12/29/20  1350   * 138 138   K 3.9 3.6 3.6    108 107   CO2 24 24 26   GLU 83 89 86   BUN 15 13 15   CREA 1.18* 1.00 1.24*   CA 9.0 9.0 9.1   MG 1.9 1.9  --    ALB 3.2*  --  3.4*   TBILI 0.5  --  0.3   ALT 28  --  23   INR 1.0  --   --        Signed: Yolette May NP

## 2020-12-31 NOTE — PROGRESS NOTES
Plan:  -? SNF for rehab  -[employer] auth required      10:02AM  COVID test returned positive. Per Perri Apley at [facility], unable to accept pt COVID+ unless pt completes 14 day quarantine in the hospital. Pt moving to [facility]. Handoff provided to ortho CM. Will need to establish alternate d/c plan. PC from [facility]. Bryna Kanner obtained for 1 day for [facility]. Will need to renew/update. CM will continue to follow and assist with d/c planning.        Bigg Gore, MSW  Care Manager

## 2020-12-31 NOTE — PROGRESS NOTES
Problem: Mobility Impaired (Adult and Pediatric)  Goal: *Acute Goals and Plan of Care (Insert Text)  Description: FUNCTIONAL STATUS PRIOR TO ADMISSION: Pt reports ambulation with use of RW. Denies history of falls. Reports increased weakness/difficulty caring for herself over previous 1 month. Pt reports x1 month of increased dizziness with mobility, stating she was mostly spending the day in bed as a result. HOME SUPPORT PRIOR TO ADMISSION: The patient lives alone. Son lives in the area and assists as needed. Pt reports she previously had caregiver 5 days/wk for 5 hrs however caregiver has stopped coming to her home. Physical Therapy Goals  Initiated 12/30/2020  1. Patient will move from supine to sit and sit to supine , scoot up and down, and roll side to side in bed with supervision/set-up within 7 day(s). 2.  Patient will transfer from bed to chair and chair to bed with supervision/set-up using the least restrictive device within 7 day(s). 3.  Patient will perform sit to stand with supervision/set-up within 7 day(s). 4.  Patient will ambulate with contact guard assist for 25 feet with the least restrictive device within 7 day(s). Outcome: Progressing Towards Goal   PHYSICAL THERAPY TREATMENT  Patient: Ronna Calabrese (80 y.o. female)  Date: 12/31/2020  Diagnosis: Generalized weakness [R53.1]  Orthostatic hypotension [I95.1] <principal problem not specified>       Precautions: Fall  Chart, physical therapy assessment, plan of care and goals were reviewed. ASSESSMENT  Patient continues with skilled PT services and is progressing towards goals however continues to present with orthostatic hypotension, impaired activity tolerance/endurance, generalized weakness, impaired balance, and altered gait pattern. Pt remains orthostatic during standing/ambulation, reporting increased dizziness/lightheadedness.  Pt only able to tolerate ambulation trial from EOB>>bedside chair w/ RW and minAx2 before fatigue. Increased trunk flexion noted during ambulation with pt requiring verbal/tactile cueing to facilitate upright posture. BP stabilized post activity with pt at seated rest in bedside chair. Pt continues to function below her baseline mod I level and an increased falls risk. Continue to recommend SNF at discharge. Current Level of Function Impacting Discharge (mobility/balance): minAx2 during sit<>stand, minAx2 during ambulation w/ RW support    Other factors to consider for discharge: lives alone, falls risk         PLAN :  Patient continues to benefit from skilled intervention to address the above impairments. Continue treatment per established plan of care. to address goals. Recommendation for discharge: (in order for the patient to meet his/her long term goals)  Therapy up to 5 days/week in SNF setting    This discharge recommendation:  Has been made in collaboration with the attending provider and/or case management    IF patient discharges home will need the following DME: to be determined (TBD)       SUBJECTIVE:   Patient stated No pain today.     OBJECTIVE DATA SUMMARY:   Critical Behavior:  Neurologic State: Alert  Orientation Level: Oriented X4  Cognition: Follows commands  Safety/Judgement: Fall prevention, Awareness of environment  Functional Mobility Training:  Bed Mobility:     Supine to Sit: Minimum assistance;Assist x2;Bed Modified              Transfers:  Sit to Stand: Minimum assistance;Assist x2  Stand to Sit: Minimum assistance;Assist x2        Bed to Chair: Minimum assistance;Assist x2(RW)                    Balance:  Sitting: Intact  Sitting - Static: Good (unsupported)  Sitting - Dynamic: Good (unsupported)  Standing: Impaired; With support  Standing - Static: Fair;Constant support  Standing - Dynamic : Fair;Constant support  Ambulation/Gait Training:  Distance (ft): 3 Feet (ft)  Assistive Device: Walker, rolling;Gait belt  Ambulation - Level of Assistance: Minimal assistance;Assist x2        Gait Abnormalities: Decreased step clearance;Shuffling gait;Trunk sway increased        Base of Support: Narrowed     Speed/Roseline: Slow;Shuffled  Step Length: Right shortened;Left shortened                Pain Rating:  Denied complaints of pain    Activity Tolerance:   signs and symptoms of orthostatic hypotension    After treatment patient left in no apparent distress:   Sitting in chair, Call bell within reach, and Bed / chair alarm activated    COMMUNICATION/COLLABORATION:   The patients plan of care was discussed with: Occupational therapist and Registered nurse.      Lanie Miranda PT, DPT   Time Calculation: 20 mins

## 2021-01-01 PROBLEM — I10 HYPERTENSION: Status: ACTIVE | Noted: 2021-01-01

## 2021-01-01 PROBLEM — I25.10 CAD (CORONARY ARTERY DISEASE): Status: ACTIVE | Noted: 2021-01-01

## 2021-01-01 LAB
ALBUMIN SERPL-MCNC: 2.8 G/DL (ref 3.5–5)
ALBUMIN/GLOB SERPL: 0.7 {RATIO} (ref 1.1–2.2)
ALP SERPL-CCNC: 50 U/L (ref 45–117)
ALT SERPL-CCNC: 27 U/L (ref 12–78)
ANION GAP SERPL CALC-SCNC: 6 MMOL/L (ref 5–15)
APTT PPP: 34.6 SEC (ref 22.1–31)
AST SERPL-CCNC: 26 U/L (ref 15–37)
BASOPHILS # BLD: 0 K/UL (ref 0–0.1)
BASOPHILS NFR BLD: 0 % (ref 0–1)
BILIRUB SERPL-MCNC: 0.4 MG/DL (ref 0.2–1)
BUN SERPL-MCNC: 21 MG/DL (ref 6–20)
BUN/CREAT SERPL: 17 (ref 12–20)
CALCIUM SERPL-MCNC: 8.7 MG/DL (ref 8.5–10.1)
CHLORIDE SERPL-SCNC: 106 MMOL/L (ref 97–108)
CO2 SERPL-SCNC: 24 MMOL/L (ref 21–32)
CREAT SERPL-MCNC: 1.23 MG/DL (ref 0.55–1.02)
CRP SERPL-MCNC: 4.43 MG/DL (ref 0–0.6)
D DIMER PPP FEU-MCNC: 0.4 MG/L FEU (ref 0–0.65)
DIFFERENTIAL METHOD BLD: ABNORMAL
EOSINOPHIL # BLD: 0 K/UL (ref 0–0.4)
EOSINOPHIL NFR BLD: 0 % (ref 0–7)
ERYTHROCYTE [DISTWIDTH] IN BLOOD BY AUTOMATED COUNT: 14.5 % (ref 11.5–14.5)
FERRITIN SERPL-MCNC: 399 NG/ML (ref 8–252)
FIBRINOGEN PPP-MCNC: 590 MG/DL (ref 200–475)
GLOBULIN SER CALC-MCNC: 3.8 G/DL (ref 2–4)
GLUCOSE SERPL-MCNC: 146 MG/DL (ref 65–100)
HCT VFR BLD AUTO: 35.2 % (ref 35–47)
HGB BLD-MCNC: 11.7 G/DL (ref 11.5–16)
IMM GRANULOCYTES # BLD AUTO: 0.1 K/UL (ref 0–0.04)
IMM GRANULOCYTES NFR BLD AUTO: 1 % (ref 0–0.5)
INR PPP: 1 (ref 0.9–1.1)
LYMPHOCYTES # BLD: 0.9 K/UL (ref 0.8–3.5)
LYMPHOCYTES NFR BLD: 17 % (ref 12–49)
MCH RBC QN AUTO: 25.5 PG (ref 26–34)
MCHC RBC AUTO-ENTMCNC: 33.2 G/DL (ref 30–36.5)
MCV RBC AUTO: 76.7 FL (ref 80–99)
MONOCYTES # BLD: 0.3 K/UL (ref 0–1)
MONOCYTES NFR BLD: 5 % (ref 5–13)
NEUTS SEG # BLD: 4.1 K/UL (ref 1.8–8)
NEUTS SEG NFR BLD: 77 % (ref 32–75)
NRBC # BLD: 0 K/UL (ref 0–0.01)
NRBC BLD-RTO: 0 PER 100 WBC
PLATELET # BLD AUTO: 275 K/UL (ref 150–400)
PMV BLD AUTO: 10.4 FL (ref 8.9–12.9)
POTASSIUM SERPL-SCNC: 4.2 MMOL/L (ref 3.5–5.1)
PROT SERPL-MCNC: 6.6 G/DL (ref 6.4–8.2)
PROTHROMBIN TIME: 10.3 SEC (ref 9–11.1)
RBC # BLD AUTO: 4.59 M/UL (ref 3.8–5.2)
SODIUM SERPL-SCNC: 136 MMOL/L (ref 136–145)
THERAPEUTIC RANGE,PTTT: ABNORMAL SECS (ref 58–77)
WBC # BLD AUTO: 5.3 K/UL (ref 3.6–11)

## 2021-01-01 PROCEDURE — 74011250637 HC RX REV CODE- 250/637: Performed by: FAMILY MEDICINE

## 2021-01-01 PROCEDURE — 85379 FIBRIN DEGRADATION QUANT: CPT

## 2021-01-01 PROCEDURE — 85730 THROMBOPLASTIN TIME PARTIAL: CPT

## 2021-01-01 PROCEDURE — 85384 FIBRINOGEN ACTIVITY: CPT

## 2021-01-01 PROCEDURE — 85025 COMPLETE CBC W/AUTO DIFF WBC: CPT

## 2021-01-01 PROCEDURE — 85610 PROTHROMBIN TIME: CPT

## 2021-01-01 PROCEDURE — 74011250637 HC RX REV CODE- 250/637: Performed by: NURSE PRACTITIONER

## 2021-01-01 PROCEDURE — 80053 COMPREHEN METABOLIC PANEL: CPT

## 2021-01-01 PROCEDURE — 65660000000 HC RM CCU STEPDOWN

## 2021-01-01 PROCEDURE — 36415 COLL VENOUS BLD VENIPUNCTURE: CPT

## 2021-01-01 PROCEDURE — 74011250636 HC RX REV CODE- 250/636: Performed by: NURSE PRACTITIONER

## 2021-01-01 RX ADMIN — Medication 10 ML: at 05:48

## 2021-01-01 RX ADMIN — CHOLECALCIFEROL TAB 25 MCG (1000 UNIT) 2 TABLET: 25 TAB at 09:50

## 2021-01-01 RX ADMIN — ZINC SULFATE 220 MG (50 MG) CAPSULE 1 CAPSULE: CAPSULE at 09:50

## 2021-01-01 RX ADMIN — ACETAMINOPHEN 650 MG: 325 TABLET ORAL at 20:43

## 2021-01-01 RX ADMIN — OXYCODONE HYDROCHLORIDE AND ACETAMINOPHEN 500 MG: 500 TABLET ORAL at 09:50

## 2021-01-01 RX ADMIN — DEXAMETHASONE 6 MG: 4 TABLET ORAL at 09:50

## 2021-01-01 RX ADMIN — Medication 10 ML: at 16:27

## 2021-01-01 RX ADMIN — Medication 10 ML: at 20:45

## 2021-01-01 RX ADMIN — OXYCODONE HYDROCHLORIDE AND ACETAMINOPHEN 500 MG: 500 TABLET ORAL at 17:58

## 2021-01-01 RX ADMIN — ZINC SULFATE 220 MG (50 MG) CAPSULE 1 CAPSULE: CAPSULE at 20:43

## 2021-01-01 RX ADMIN — GUAIFENESIN AND DEXTROMETHORPHAN 5 ML: 100; 10 SYRUP ORAL at 16:27

## 2021-01-01 RX ADMIN — ENOXAPARIN SODIUM 30 MG: 30 INJECTION SUBCUTANEOUS at 09:50

## 2021-01-01 RX ADMIN — ENOXAPARIN SODIUM 30 MG: 30 INJECTION SUBCUTANEOUS at 20:43

## 2021-01-01 RX ADMIN — IBUPROFEN 400 MG: 400 TABLET, FILM COATED ORAL at 20:43

## 2021-01-01 NOTE — PROGRESS NOTES
0700: Verbal shift change report given to Yun Jarrell RN (oncoming nurse) by Jerome Garcia (offgoing nurse). Report included the following information SBAR.

## 2021-01-01 NOTE — PROGRESS NOTES
End of Shift Note    Bedside shift change report given to Kathy Bella RN (oncoming nurse) by Gregor Mcintyre (offgoing nurse). Report included the following information SBAR    Shift worked:  Day     Shift summary and any significant changes:          Concerns for physician to address:       Zone phone for oncoming shift:          Activity:  Activity Level: Up with Assistance  Number times ambulated in hallways past shift: 0  Number of times OOB to chair past shift: 0    Cardiac:   Cardiac Monitoring: Yes      Cardiac Rhythm: Normal sinus rhythm    Access:   Current line(s): PIV     Genitourinary:   Urinary status: voiding and external catheter    Respiratory:   O2 Device: Room air  Chronic home O2 use?: NO  Incentive spirometer at bedside: N/A     GI:  Last Bowel Movement Date: 12/30/20  Current diet:  DIET GI LITE (POST SURGICAL)  Passing flatus: YES  Tolerating current diet: YES  % Diet Eaten: 50 %    Pain Management:   Patient states pain is manageable on current regimen: YES    Skin:  Cordell Score: 18  Interventions: float heels and internal/external urinary devices    Patient Safety:  Fall Score:  Total Score: 2  Interventions: bed/chair alarm, assistive device (walker, cane, etc) and pt to call before getting OOB  High Fall Risk: Yes    Length of Stay:  Expected LOS: - - -  Actual LOS: 0      Gregor Mcintyre

## 2021-01-01 NOTE — PROGRESS NOTES
1630 Bedside shift change report given to [address] (oncoming nurse) by Stephenie Field (offgoing nurse). Report included the following information SBAR and Kardex.

## 2021-01-01 NOTE — ACP (ADVANCE CARE PLANNING)
Advance Care Planning Note      NAME: Pb Sykes   :  1934   MRN:  [id number]     Date/Time:  2021 5:40 PM    Active Diagnoses:  Hospital Problems  Never Reviewed          Codes Class Noted POA    Hypertension ICD-10-CM: I10  ICD-9-CM: 401.9  2021 Yes        CAD (coronary artery disease) ICD-10-CM: I25.10  ICD-9-CM: 414.00  2021 Yes        Orthostatic hypotension ICD-10-CM: I95.1  ICD-9-CM: 458.0  2020 Yes        Generalized weakness ICD-10-CM: R53.1  ICD-9-CM: 780.79  2020 Yes              These active diagnoses are of sufficient risk that focused discussion on advance care planning is indicated in order to allow the patient to thoughtfully consider personal goals of care, and if situations arise that prevent the ability to personally give input, to ensure appropriate representation of their personal desires for different levels and aggressiveness of care. Discussion:   Code status addressed and wants to be a Full Code. Patient wants central line and vasopressors if needed. Patient would also want a feeding tube, if needed, for nutritional support. Patient  would like to assign Abigail Ruggiero (son) [phone]  as the surrogate decision maker. Persons present and participating in discussion: Maria Luz Duque 78 Tamir Jones NP, Abigail Ruggiero    Time Spent:   Total time spent face-to-face in education and discussion:  16 minutes. Yolette Jones NP   Hospitalist

## 2021-01-01 NOTE — PROGRESS NOTES
Problem: Falls - Risk of  Goal: *Absence of Falls  Description: Document Do Minium Fall Risk and appropriate interventions in the flowsheet. Outcome: Progressing Towards Goal  Note: Fall Risk Interventions:  Mobility Interventions: Bed/chair exit alarm, Communicate number of staff needed for ambulation/transfer, Patient to call before getting OOB, Utilize walker, cane, or other assistive device         Medication Interventions: Bed/chair exit alarm, Patient to call before getting OOB, Teach patient to arise slowly    Elimination Interventions: Bed/chair exit alarm, Call light in reach, Patient to call for help with toileting needs, Toileting schedule/hourly rounds              Problem: Pressure Injury - Risk of  Goal: *Prevention of pressure injury  Description: Document Cordell Scale and appropriate interventions in the flowsheet.   Outcome: Progressing Towards Goal  Note: Pressure Injury Interventions:  Sensory Interventions: Assess changes in LOC, Float heels, Keep linens dry and wrinkle-free, Maintain/enhance activity level, Minimize linen layers    Moisture Interventions: Absorbent underpads, Apply protective barrier, creams and emollients, Internal/External urinary devices, Limit adult briefs, Minimize layers    Activity Interventions: Assess need for specialty bed, Increase time out of bed, Pressure redistribution bed/mattress(bed type)    Mobility Interventions: Assess need for specialty bed, HOB 30 degrees or less, Pressure redistribution bed/mattress (bed type)    Nutrition Interventions: Document food/fluid/supplement intake    Friction and Shear Interventions: Apply protective barrier, creams and emollients, Foam dressings/transparent film/skin sealants, Lift team/patient mobility team, Minimize layers

## 2021-01-01 NOTE — PROGRESS NOTES
Problem: Falls - Risk of  Goal: *Absence of Falls  Description: Document Bentleyevaristo Trippas Fall Risk and appropriate interventions in the flowsheet. Outcome: Progressing Towards Goal  Note: Fall Risk Interventions:  Mobility Interventions: Bed/chair exit alarm, Communicate number of staff needed for ambulation/transfer, OT consult for ADLs, PT Consult for mobility concerns, Patient to call before getting OOB, PT Consult for assist device competence, Strengthening exercises (ROM-active/passive), Utilize walker, cane, or other assistive device              Elimination Interventions: Bed/chair exit alarm, Call light in reach, Patient to call for help with toileting needs              Problem: Patient Education: Go to Patient Education Activity  Goal: Patient/Family Education  Outcome: Progressing Towards Goal     Problem: Pressure Injury - Risk of  Goal: *Prevention of pressure injury  Description: Document Cordell Scale and appropriate interventions in the flowsheet.   Outcome: Progressing Towards Goal  Note: Pressure Injury Interventions:  Sensory Interventions: Float heels, Keep linens dry and wrinkle-free, Minimize linen layers    Moisture Interventions: Internal/External urinary devices, Absorbent underpads, Minimize layers    Activity Interventions: Increase time out of bed, PT/OT evaluation    Mobility Interventions: Float heels, HOB 30 degrees or less, PT/OT evaluation    Nutrition Interventions: Document food/fluid/supplement intake    Friction and Shear Interventions: Lift sheet, Minimize layers, HOB 30 degrees or less                Problem: Patient Education: Go to Patient Education Activity  Goal: Patient/Family Education  Outcome: Progressing Towards Goal

## 2021-01-01 NOTE — PROGRESS NOTES
End of Shift Note    Bedside shift change report given to  (oncoming nurse) by Amara Kelly RN (offgoing nurse). Report included the following information SBAR, Kardex, ED Summary, Intake/Output, MAR, Accordion and Recent Results    Shift worked:  7p-7a     Shift summary and any significant changes:     No Changes     Concerns for physician to address:  None     Zone phone for oncoming shift:   [phone]       Activity:  Activity Level: Up with Assistance  Number times ambulated in hallways past shift: 0  Number of times OOB to chair past shift: 0    Cardiac:   Cardiac Monitoring: Yes      Cardiac Rhythm: Normal sinus rhythm    Access:   Current line(s): PIV     Genitourinary:   Urinary status: voiding    Respiratory:   O2 Device: Room air  Chronic home O2 use?: NO  Incentive spirometer at bedside: YES     GI:  Last Bowel Movement Date: 12/30/20  Current diet:  DIET GI LITE (POST SURGICAL)  Passing flatus: YES  Tolerating current diet: YES  % Diet Eaten: 50 %    Pain Management:   Patient states pain is manageable on current regimen: YES    Skin:  Cordell Score: 18  Interventions: speciality bed and increase time out of bed    Patient Safety:  Fall Score:  Total Score: 2  Interventions: assistive device (walker, cane, etc) and pt to call before getting OOB  High Fall Risk: Yes    Length of Stay:  Expected LOS: - - -  Actual LOS: 1      Amara Kelly RN

## 2021-01-01 NOTE — PROGRESS NOTES
End of Shift Note    Bedside shift change report given JADE Francisco (oncoming nurse) by Adilson Bhardwaj RN (offgoing nurse). Report included the following information SBAR, Kardex, ED Summary, Intake/Output, MAR, Accordion and Recent Results    Shift worked:  7p-7a     Shift summary and any significant changes:     No Changes     Concerns for physician to address:  None     Zone phone for oncoming shift:   [phone]       Activity:  Activity Level: Up with Assistance  Number times ambulated in hallways past shift: 0  Number of times OOB to chair past shift: 0    Cardiac:   Cardiac Monitoring: Yes      Cardiac Rhythm: Normal sinus rhythm    Access:   Current line(s): PIV     Genitourinary:   Urinary status: voiding and external catheter    Respiratory:   O2 Device: Room air  Chronic home O2 use?: NO  Incentive spirometer at bedside: YES     GI:  Last Bowel Movement Date: 12/30/20  Current diet:  DIET GI LITE (POST SURGICAL)  Passing flatus: YES  Tolerating current diet: YES  % Diet Eaten: 50 %    Pain Management:   Patient states pain is manageable on current regimen: YES    Skin:  Cordell Score: 18  Interventions: turn team and speciality bed    Patient Safety:  Fall Score:  Total Score: 2  Interventions: bed/chair alarm and assistive device (walker, cane, etc)  High Fall Risk: Yes    Length of Stay:  Expected LOS: - - -  Actual LOS: 1      Adilson Bhardwaj RN

## 2021-01-01 NOTE — PROGRESS NOTES
I reviewed pertinent labs and imaging, and discussed /agreed on the plan of care with Dr. Socorro Byrd      Hospitalist Progress Note    NAME: Armando Bermudez   :  1934   MRN:  [id number]       Assessment / Plan:    Hitesh positive   -asymptomatic screening for SNF placement  - pt asymptomatic - has dry cough , no fever ,on room air   - Pt positive , COVID labs done    check LFT norms;  CRP 4.43 / Ferritin  399 /  /  D-dimer 0.40/ Fibrinogen 590/  Pro calcitonin <0.05 Troponin  Vitamin C 500mg po BID ,Zinc 220mg PO BID  - transfer off unit to Covid unit   - discussion with adalgisa Chavez of dx and urge to get tested also  - pt remains on room air , dry cough , afebrile   - will need Neg Covid test for SNF placement     Generalized weakness  Symptomatic orthostatic hypotension  - second visit  to ER with c/o generalized weakness / dizziness   - noted to be orthostatic on the    - unable to ambulate at home   -son requesting possible rehab placement since pt lives alone   - PT/OT consulted for opinion   - appreciate CM input     CAD  HTN  - Sbp 130-160/71  - DC dilt today HR 45-53 overnight   - may consider starting amlodipine if needed   -Cont to hold Losarten for Cr. 1.18  - monitor throughout day   -Trop negative   Spoke to adalgisa Chavez and updated on pt condition     Code Status: Full  Surrogate Decision Maker adalgisa Chavez [phone]    Body mass index is 23.4 kg/m². Code status: Full  Prophylaxis: Lovenox  Recommended Disposition: TBD     Subjective:     Chief Complaint / Reason for Physician Visit  \"I am hungry can I get real food   \". Discussed with RN events overnight. Pt appears in good spirits .  Remains asymptomatic   Review of Systems:  Symptom Y/N Comments  Symptom Y/N Comments   Fever/Chills n   Chest Pain n    Poor Appetite n   Edema n    Cough y dry  Abdominal Pain n    Sputum n   Joint Pain n    SOB/AGUILERA n   Pruritis/Rash n    Nausea/vomit n   Tolerating PT/OT n    Diarrhea n Tolerating Diet y    Constipation    Other       Could NOT obtain due to:      Objective:     VITALS:   Last 24hrs VS reviewed since prior progress note. Most recent are:  Patient Vitals for the past 24 hrs:   Temp Pulse Resp BP SpO2   01/01/21 0739 97.4 °F (36.3 °C) (!) 45 16 135/60 95 %   01/01/21 0349 97.9 °F (36.6 °C) (!) 53 16 130/60 97 %   01/01/21 0313 97.9 °F (36.6 °C) (!) 52 16 119/60 97 %   12/31/20 2326 97.6 °F (36.4 °C) 65 18 131/60 97 %   12/31/20 2037  70   96 %   12/31/20 2000  71      12/31/20 1939 98.5 °F (36.9 °C) 63 20 137/62 98 %   12/31/20 1806 98.5 °F (36.9 °C) 76 20 137/63 98 %     No intake or output data in the 24 hours ending 01/01/21 1441     I had a face to face encounter and independently examined this patient on 1/1/2021, as outlined below:  PHYSICAL EXAM:  General: Thin . Alert, cooperative, no acute distress    EENT:  EOMI. Anicteric sclerae. MMM poor dentition   Resp:   no wheezing or rales. No accessory muscle use, room air   CV:  S1S2,  No edema  GI:  Soft,  Non tender. +Bowel sounds + flatus   Neurologic:  Alert and oriented X 3, normal speech,   Psych:   . Not anxious nor agitated  Skin:  No rashes. No jaundice Dry ,      Reviewed most current lab test results and cultures  YES  Reviewed most current radiology test results   YES  Review and summation of old records today    NO  Reviewed patient's current orders and MAR    YES  PMH/SH reviewed - no change compared to H&P  ________________________________________________________________________  Care Plan discussed with:    Comments   Patient x    Family  x    RN     Care Manager x    Consultant                        Multidiciplinary team rounds were held today with , nursing, pharmacist and clinical coordinator. Patient's plan of care was discussed; medications were reviewed and discharge planning was addressed.      ________________________________________________________________________  Total NON critical care TIME: 30   Minutes    Total CRITICAL CARE TIME Spent:   Minutes non procedure based      Comments   >50% of visit spent in counseling and coordination of care     ________________________________________________________________________  Bandar Reeder. Aidan Haro NP     Procedures: see electronic medical records for all procedures/Xrays and details which were not copied into this note but were reviewed prior to creation of Plan. LABS:  I reviewed today's most current labs and imaging studies. Pertinent labs include:  Recent Labs     01/01/21 0259 12/31/20  1120 12/30/20  0330   WBC 5.3 6.4 5.2   HGB 11.7 12.1 11.9   HCT 35.2 37.2 36.8    279 253     Recent Labs     01/01/21 0259 12/31/20  1120 12/30/20  0330    135* 138   K 4.2 3.9 3.6    105 108   CO2 24 24 24   * 83 89   BUN 21* 15 13   CREA 1.23* 1.18* 1.00   CA 8.7 9.0 9.0   MG  --  1.9 1.9   ALB 2.8* 3.2*  --    TBILI 0.4 0.5  --    ALT 27 28  --    INR 1.0 1.0  --        Signed: Yolette Haro NP

## 2021-01-02 LAB
ALBUMIN SERPL-MCNC: 2.9 G/DL (ref 3.5–5)
ALBUMIN/GLOB SERPL: 0.7 {RATIO} (ref 1.1–2.2)
ALP SERPL-CCNC: 51 U/L (ref 45–117)
ALT SERPL-CCNC: 29 U/L (ref 12–78)
ANION GAP SERPL CALC-SCNC: 8 MMOL/L (ref 5–15)
APTT PPP: 33.5 SEC (ref 22.1–31)
AST SERPL-CCNC: 40 U/L (ref 15–37)
BASOPHILS # BLD: 0 K/UL (ref 0–0.1)
BASOPHILS NFR BLD: 0 % (ref 0–1)
BILIRUB SERPL-MCNC: 0.7 MG/DL (ref 0.2–1)
BUN SERPL-MCNC: 28 MG/DL (ref 6–20)
BUN/CREAT SERPL: 21 (ref 12–20)
CALCIUM SERPL-MCNC: 9 MG/DL (ref 8.5–10.1)
CHLORIDE SERPL-SCNC: 108 MMOL/L (ref 97–108)
CO2 SERPL-SCNC: 21 MMOL/L (ref 21–32)
CREAT SERPL-MCNC: 1.31 MG/DL (ref 0.55–1.02)
D DIMER PPP FEU-MCNC: 0.33 MG/L FEU (ref 0–0.65)
DIFFERENTIAL METHOD BLD: ABNORMAL
EOSINOPHIL # BLD: 0 K/UL (ref 0–0.4)
EOSINOPHIL NFR BLD: 0 % (ref 0–7)
ERYTHROCYTE [DISTWIDTH] IN BLOOD BY AUTOMATED COUNT: 14.3 % (ref 11.5–14.5)
FIBRINOGEN PPP-MCNC: 562 MG/DL (ref 200–475)
GLOBULIN SER CALC-MCNC: 3.9 G/DL (ref 2–4)
GLUCOSE SERPL-MCNC: 130 MG/DL (ref 65–100)
HCT VFR BLD AUTO: 36.9 % (ref 35–47)
HGB BLD-MCNC: 12.3 G/DL (ref 11.5–16)
IMM GRANULOCYTES # BLD AUTO: 0.2 K/UL (ref 0–0.04)
IMM GRANULOCYTES NFR BLD AUTO: 1 % (ref 0–0.5)
INR PPP: 1 (ref 0.9–1.1)
LYMPHOCYTES # BLD: 1.1 K/UL (ref 0.8–3.5)
LYMPHOCYTES NFR BLD: 9 % (ref 12–49)
MCH RBC QN AUTO: 25.3 PG (ref 26–34)
MCHC RBC AUTO-ENTMCNC: 33.3 G/DL (ref 30–36.5)
MCV RBC AUTO: 75.9 FL (ref 80–99)
MONOCYTES # BLD: 0.7 K/UL (ref 0–1)
MONOCYTES NFR BLD: 6 % (ref 5–13)
NEUTS SEG # BLD: 9.8 K/UL (ref 1.8–8)
NEUTS SEG NFR BLD: 84 % (ref 32–75)
NRBC # BLD: 0 K/UL (ref 0–0.01)
NRBC BLD-RTO: 0 PER 100 WBC
PLATELET # BLD AUTO: 338 K/UL (ref 150–400)
PMV BLD AUTO: 10.6 FL (ref 8.9–12.9)
POTASSIUM SERPL-SCNC: 4 MMOL/L (ref 3.5–5.1)
PROT SERPL-MCNC: 6.8 G/DL (ref 6.4–8.2)
PROTHROMBIN TIME: 10.1 SEC (ref 9–11.1)
RBC # BLD AUTO: 4.86 M/UL (ref 3.8–5.2)
SODIUM SERPL-SCNC: 137 MMOL/L (ref 136–145)
THERAPEUTIC RANGE,PTTT: ABNORMAL SECS (ref 58–77)
WBC # BLD AUTO: 11.8 K/UL (ref 3.6–11)

## 2021-01-02 PROCEDURE — 80053 COMPREHEN METABOLIC PANEL: CPT

## 2021-01-02 PROCEDURE — 74011250636 HC RX REV CODE- 250/636: Performed by: NURSE PRACTITIONER

## 2021-01-02 PROCEDURE — 36415 COLL VENOUS BLD VENIPUNCTURE: CPT

## 2021-01-02 PROCEDURE — 85379 FIBRIN DEGRADATION QUANT: CPT

## 2021-01-02 PROCEDURE — 85025 COMPLETE CBC W/AUTO DIFF WBC: CPT

## 2021-01-02 PROCEDURE — 65660000000 HC RM CCU STEPDOWN

## 2021-01-02 PROCEDURE — 85384 FIBRINOGEN ACTIVITY: CPT

## 2021-01-02 PROCEDURE — 85730 THROMBOPLASTIN TIME PARTIAL: CPT

## 2021-01-02 PROCEDURE — 74011250637 HC RX REV CODE- 250/637: Performed by: FAMILY MEDICINE

## 2021-01-02 PROCEDURE — 74011250637 HC RX REV CODE- 250/637: Performed by: NURSE PRACTITIONER

## 2021-01-02 PROCEDURE — 85610 PROTHROMBIN TIME: CPT

## 2021-01-02 PROCEDURE — 74011250637 HC RX REV CODE- 250/637: Performed by: GENERAL ACUTE CARE HOSPITAL

## 2021-01-02 RX ADMIN — OXYCODONE HYDROCHLORIDE AND ACETAMINOPHEN 500 MG: 500 TABLET ORAL at 08:56

## 2021-01-02 RX ADMIN — ENOXAPARIN SODIUM 30 MG: 30 INJECTION SUBCUTANEOUS at 09:05

## 2021-01-02 RX ADMIN — ENOXAPARIN SODIUM 30 MG: 30 INJECTION SUBCUTANEOUS at 21:55

## 2021-01-02 RX ADMIN — DEXAMETHASONE 6 MG: 4 TABLET ORAL at 09:06

## 2021-01-02 RX ADMIN — OXYCODONE HYDROCHLORIDE AND ACETAMINOPHEN 500 MG: 500 TABLET ORAL at 15:48

## 2021-01-02 RX ADMIN — ACETAMINOPHEN 650 MG: 325 TABLET ORAL at 15:55

## 2021-01-02 RX ADMIN — CHOLECALCIFEROL TAB 25 MCG (1000 UNIT) 2 TABLET: 25 TAB at 08:56

## 2021-01-02 RX ADMIN — ZINC SULFATE 220 MG (50 MG) CAPSULE 1 CAPSULE: CAPSULE at 21:55

## 2021-01-02 RX ADMIN — Medication 10 ML: at 08:56

## 2021-01-02 RX ADMIN — Medication 10 ML: at 21:56

## 2021-01-02 RX ADMIN — POLYETHYLENE GLYCOL 3350 17 G: 17 POWDER, FOR SOLUTION ORAL at 09:51

## 2021-01-02 RX ADMIN — ZINC SULFATE 220 MG (50 MG) CAPSULE 1 CAPSULE: CAPSULE at 08:56

## 2021-01-02 RX ADMIN — GUAIFENESIN AND DEXTROMETHORPHAN 5 ML: 100; 10 SYRUP ORAL at 15:55

## 2021-01-02 RX ADMIN — Medication 10 ML: at 15:47

## 2021-01-02 RX ADMIN — ACETAMINOPHEN 650 MG: 325 TABLET ORAL at 09:05

## 2021-01-02 NOTE — PROGRESS NOTES
Problem: Falls - Risk of  Goal: *Absence of Falls  Description: Document Ada Patel Fall Risk and appropriate interventions in the flowsheet. Outcome: Progressing Towards Goal  Note: Fall Risk Interventions:  Mobility Interventions: Bed/chair exit alarm, Communicate number of staff needed for ambulation/transfer, Patient to call before getting OOB, Utilize walker, cane, or other assistive device         Medication Interventions: Bed/chair exit alarm, Patient to call before getting OOB, Teach patient to arise slowly    Elimination Interventions: Bed/chair exit alarm, Call light in reach, Patient to call for help with toileting needs, Toileting schedule/hourly rounds              Problem: Patient Education: Go to Patient Education Activity  Goal: Patient/Family Education  Outcome: Progressing Towards Goal     Problem: Pressure Injury - Risk of  Goal: *Prevention of pressure injury  Description: Document Cordell Scale and appropriate interventions in the flowsheet.   Outcome: Progressing Towards Goal  Note: Pressure Injury Interventions:  Sensory Interventions: Assess changes in LOC, Float heels, Keep linens dry and wrinkle-free, Maintain/enhance activity level, Minimize linen layers    Moisture Interventions: Absorbent underpads, Apply protective barrier, creams and emollients, Internal/External urinary devices, Limit adult briefs, Minimize layers    Activity Interventions: Assess need for specialty bed, Increase time out of bed, Pressure redistribution bed/mattress(bed type)    Mobility Interventions: Assess need for specialty bed, HOB 30 degrees or less, Pressure redistribution bed/mattress (bed type)    Nutrition Interventions: Document food/fluid/supplement intake    Friction and Shear Interventions: Apply protective barrier, creams and emollients, Foam dressings/transparent film/skin sealants, Lift team/patient mobility team, Minimize layers                Problem: Patient Education: Go to Patient Education Activity  Goal: Patient/Family Education  Outcome: Progressing Towards Goal     Problem: Patient Education: Go to Patient Education Activity  Goal: Patient/Family Education  Outcome: Progressing Towards Goal     Problem: Patient Education: Go to Patient Education Activity  Goal: Patient/Family Education  Outcome: Progressing Towards Goal

## 2021-01-02 NOTE — PROGRESS NOTES
I reviewed pertinent labs and imaging, and discussed /agreed on the plan of care with Dr. Kat Turner      Hospitalist Progress Note    NAME: Alex Cummings   :  1934   MRN:  [id number]       Assessment / Plan:    Constipation   - no BM x 4 days   - Miralax PRN     COVID positive   -asymptomatic screening for SNF placement  - pt asymptomatic - has dry cough , no fever ,on room air   - Pt positive , COVID labs done    check LFT norms;  CRP 4.43 / Ferritin  399 /  /  D-dimer 0.40/ Fibrinogen 562/  Pro calcitonin <0.05 Troponin  Vitamin C 500mg po BID ,Zinc 220mg PO BIDv  - pt remains on room air , dry cough , afebrile   - will need Neg Covid test for SNF placement     Generalized weakness  Symptomatic orthostatic hypotension  - second visit  to ER with c/o generalized weakness / dizziness   - noted to be orthostatic on the    - unable to ambulate at home   -son requesting possible rehab placement since pt lives alone   - PT/OT consulted for opinion   - appreciate CM input     CAD  HTN  - Sbp 130-160/71  - DC dilt today HR 45-53 overnight   - may consider starting amlodipine if needed   -Cont to hold Losarten for Cr. 1.18  - monitor throughout day   -Trop negative   Spoke to adalgisa Zhou and updated on pt condition     Code Status: Full  Surrogate Decision Maker adalgisa Zhou [phone]    Body mass index is 23.4 kg/m². Code status: Full  Prophylaxis: Lovenox  Recommended Disposition: TBD     Subjective:     Chief Complaint / Reason for Physician Visit  \"I am constipated  \". Discussed with RN events overnight. Pt denies any SOB c/o constipation , encouraged pt to get oob to chair and increase PO hydration  .  Pt c/o occasional dizziness     Review of Systems:  Symptom Y/N Comments  Symptom Y/N Comments   Fever/Chills n   Chest Pain n    Poor Appetite n   Edema n    Cough y dry  Abdominal Pain n    Sputum n   Joint Pain n    SOB/AGUILERA n   Pruritis/Rash n    Nausea/vomit n   Tolerating PT/OT n Diarrhea n   Tolerating Diet y    Constipation    Other       Could NOT obtain due to:      Objective:     VITALS:   Last 24hrs VS reviewed since prior progress note. Most recent are:  Patient Vitals for the past 24 hrs:   Temp Pulse Resp BP SpO2   01/02/21 0400  (!) 48      01/02/21 0306 97.4 °F (36.3 °C) (!) 50 18 137/69 97 %   01/02/21 0000  (!) 46      01/01/21 2356 97.4 °F (36.3 °C) (!) 53 18 137/61 98 %   01/01/21 2000  (!) 50      01/01/21 1952 97.7 °F (36.5 °C) 63 18 (!) 147/74 100 %   01/01/21 1603 97.7 °F (36.5 °C) (!) 53 16 (!) 140/60 96 %       Intake/Output Summary (Last 24 hours) at 1/2/2021 7108  Last data filed at 1/1/2021 2029  Gross per 24 hour   Intake    Output 300 ml   Net -300 ml        I had a face to face encounter and independently examined this patient on 1/2/2021, as outlined below:  PHYSICAL EXAM:  General: Thin . Alert, cooperative, no acute distress    EENT:  EOMI. Anicteric sclerae. MMM poor dentition   Resp:   no wheezing or rales. No accessory muscle use, room air   CV:  S1S2,  No edema  GI:  Soft,  Non tender. +Bowel sounds + flatus constipation   Neurologic:  Alert and oriented X 3, normal speech,   Psych:   . Not anxious nor agitated  Skin:  No rashes. No jaundice Dry ,      Reviewed most current lab test results and cultures  YES  Reviewed most current radiology test results   YES  Review and summation of old records today    NO  Reviewed patient's current orders and MAR    YES  PMH/SH reviewed - no change compared to H&P  ________________________________________________________________________  Care Plan discussed with:    Comments   Patient x    Family  x    RN     Care Manager x    Consultant                        Multidiciplinary team rounds were held today with , nursing, pharmacist and clinical coordinator. Patient's plan of care was discussed; medications were reviewed and discharge planning was addressed. ________________________________________________________________________  Total NON critical care TIME: 30   Minutes    Total CRITICAL CARE TIME Spent:   Minutes non procedure based      Comments   >50% of visit spent in counseling and coordination of care     ________________________________________________________________________  Anuj Martin NP     Procedures: see electronic medical records for all procedures/Xrays and details which were not copied into this note but were reviewed prior to creation of Plan. LABS:  I reviewed today's most current labs and imaging studies. Pertinent labs include:  Recent Labs     01/02/21  0339 01/01/21  0259 12/31/20  1120   WBC 11.8* 5.3 6.4   HGB 12.3 11.7 12.1   HCT 36.9 35.2 37.2    275 279     Recent Labs     01/02/21  0339 01/01/21  0259 12/31/20  1120    136 135*   K 4.0 4.2 3.9    106 105   CO2 21 24 24   * 146* 83   BUN 28* 21* 15   CREA 1.31* 1.23* 1.18*   CA 9.0 8.7 9.0   MG  --   --  1.9   ALB 2.9* 2.8* 3.2*   TBILI 0.7 0.4 0.5   ALT 29 27 28   INR 1.0 1.0 1.0       Signed: Yolette Martin NP

## 2021-01-02 NOTE — PROGRESS NOTES
Laying 132/70 HR 68 , 98 % room air . Sitting 157/91 HR 82, 97 % room air. Standing 159/85 HR 88, 93 % room air. Denies any dizziness with position changes . Assisted to chair. Miralax 17 GM given is warm prune juice . Will monitor for a bowel movement.

## 2021-01-03 LAB
ALBUMIN SERPL-MCNC: 3.3 G/DL (ref 3.5–5)
ALBUMIN/GLOB SERPL: 0.7 {RATIO} (ref 1.1–2.2)
ALP SERPL-CCNC: 62 U/L (ref 45–117)
ALT SERPL-CCNC: 46 U/L (ref 12–78)
ANION GAP SERPL CALC-SCNC: 5 MMOL/L (ref 5–15)
APTT PPP: 32.4 SEC (ref 22.1–31)
AST SERPL-CCNC: 47 U/L (ref 15–37)
BILIRUB SERPL-MCNC: 0.3 MG/DL (ref 0.2–1)
BUN SERPL-MCNC: 33 MG/DL (ref 6–20)
BUN/CREAT SERPL: 25 (ref 12–20)
CALCIUM SERPL-MCNC: 9.8 MG/DL (ref 8.5–10.1)
CHLORIDE SERPL-SCNC: 108 MMOL/L (ref 97–108)
CK SERPL-CCNC: 424 U/L (ref 26–192)
CO2 SERPL-SCNC: 23 MMOL/L (ref 21–32)
CREAT SERPL-MCNC: 1.33 MG/DL (ref 0.55–1.02)
D DIMER PPP FEU-MCNC: 0.39 MG/L FEU (ref 0–0.65)
ERYTHROCYTE [DISTWIDTH] IN BLOOD BY AUTOMATED COUNT: 14.6 % (ref 11.5–14.5)
FIBRINOGEN PPP-MCNC: 553 MG/DL (ref 200–475)
GLOBULIN SER CALC-MCNC: 4.8 G/DL (ref 2–4)
GLUCOSE SERPL-MCNC: 126 MG/DL (ref 65–100)
HCT VFR BLD AUTO: 40.7 % (ref 35–47)
HGB BLD-MCNC: 13.4 G/DL (ref 11.5–16)
INR PPP: 1 (ref 0.9–1.1)
MCH RBC QN AUTO: 25.4 PG (ref 26–34)
MCHC RBC AUTO-ENTMCNC: 32.9 G/DL (ref 30–36.5)
MCV RBC AUTO: 77.2 FL (ref 80–99)
NRBC # BLD: 0 K/UL (ref 0–0.01)
NRBC BLD-RTO: 0 PER 100 WBC
PLATELET # BLD AUTO: 391 K/UL (ref 150–400)
PMV BLD AUTO: 10.6 FL (ref 8.9–12.9)
POTASSIUM SERPL-SCNC: 4.6 MMOL/L (ref 3.5–5.1)
PROT SERPL-MCNC: 8.1 G/DL (ref 6.4–8.2)
PROTHROMBIN TIME: 10.1 SEC (ref 9–11.1)
RBC # BLD AUTO: 5.27 M/UL (ref 3.8–5.2)
SODIUM SERPL-SCNC: 136 MMOL/L (ref 136–145)
THERAPEUTIC RANGE,PTTT: ABNORMAL SECS (ref 58–77)
TROPONIN I SERPL-MCNC: <0.05 NG/ML
WBC # BLD AUTO: 15.5 K/UL (ref 3.6–11)

## 2021-01-03 PROCEDURE — 84484 ASSAY OF TROPONIN QUANT: CPT

## 2021-01-03 PROCEDURE — 85610 PROTHROMBIN TIME: CPT

## 2021-01-03 PROCEDURE — 85730 THROMBOPLASTIN TIME PARTIAL: CPT

## 2021-01-03 PROCEDURE — 82550 ASSAY OF CK (CPK): CPT

## 2021-01-03 PROCEDURE — 85027 COMPLETE CBC AUTOMATED: CPT

## 2021-01-03 PROCEDURE — 74011250637 HC RX REV CODE- 250/637: Performed by: FAMILY MEDICINE

## 2021-01-03 PROCEDURE — 74011250636 HC RX REV CODE- 250/636: Performed by: NURSE PRACTITIONER

## 2021-01-03 PROCEDURE — 74011250636 HC RX REV CODE- 250/636: Performed by: GENERAL ACUTE CARE HOSPITAL

## 2021-01-03 PROCEDURE — 65660000000 HC RM CCU STEPDOWN

## 2021-01-03 PROCEDURE — 74011250637 HC RX REV CODE- 250/637: Performed by: NURSE PRACTITIONER

## 2021-01-03 PROCEDURE — 80053 COMPREHEN METABOLIC PANEL: CPT

## 2021-01-03 PROCEDURE — 85384 FIBRINOGEN ACTIVITY: CPT

## 2021-01-03 PROCEDURE — 36415 COLL VENOUS BLD VENIPUNCTURE: CPT

## 2021-01-03 PROCEDURE — 85379 FIBRIN DEGRADATION QUANT: CPT

## 2021-01-03 RX ORDER — AMLODIPINE BESYLATE 5 MG/1
5 TABLET ORAL DAILY
Status: DISCONTINUED | OUTPATIENT
Start: 2021-01-03 | End: 2021-01-07 | Stop reason: HOSPADM

## 2021-01-03 RX ORDER — HYDRALAZINE HYDROCHLORIDE 20 MG/ML
10 INJECTION INTRAMUSCULAR; INTRAVENOUS
Status: DISCONTINUED | OUTPATIENT
Start: 2021-01-03 | End: 2021-01-07 | Stop reason: HOSPADM

## 2021-01-03 RX ORDER — ENOXAPARIN SODIUM 100 MG/ML
30 INJECTION SUBCUTANEOUS EVERY 24 HOURS
Status: DISCONTINUED | OUTPATIENT
Start: 2021-01-04 | End: 2021-01-05 | Stop reason: DRUGHIGH

## 2021-01-03 RX ADMIN — ZINC SULFATE 220 MG (50 MG) CAPSULE 1 CAPSULE: CAPSULE at 09:42

## 2021-01-03 RX ADMIN — DEXAMETHASONE 6 MG: 4 TABLET ORAL at 09:42

## 2021-01-03 RX ADMIN — ACETAMINOPHEN 650 MG: 325 TABLET ORAL at 09:42

## 2021-01-03 RX ADMIN — OXYCODONE HYDROCHLORIDE AND ACETAMINOPHEN 500 MG: 500 TABLET ORAL at 09:42

## 2021-01-03 RX ADMIN — Medication 10 ML: at 09:47

## 2021-01-03 RX ADMIN — AMLODIPINE BESYLATE 5 MG: 5 TABLET ORAL at 09:42

## 2021-01-03 RX ADMIN — Medication 10 ML: at 07:02

## 2021-01-03 RX ADMIN — CHOLECALCIFEROL TAB 25 MCG (1000 UNIT) 2 TABLET: 25 TAB at 09:42

## 2021-01-03 RX ADMIN — ZINC SULFATE 220 MG (50 MG) CAPSULE 1 CAPSULE: CAPSULE at 20:47

## 2021-01-03 RX ADMIN — ENOXAPARIN SODIUM 30 MG: 30 INJECTION SUBCUTANEOUS at 09:54

## 2021-01-03 RX ADMIN — ONDANSETRON 4 MG: 2 INJECTION INTRAMUSCULAR; INTRAVENOUS at 09:43

## 2021-01-03 RX ADMIN — GUAIFENESIN AND DEXTROMETHORPHAN 5 ML: 100; 10 SYRUP ORAL at 16:16

## 2021-01-03 RX ADMIN — ACETAMINOPHEN 650 MG: 325 TABLET ORAL at 16:16

## 2021-01-03 RX ADMIN — Medication 10 ML: at 22:04

## 2021-01-03 RX ADMIN — OXYCODONE HYDROCHLORIDE AND ACETAMINOPHEN 500 MG: 500 TABLET ORAL at 16:17

## 2021-01-03 NOTE — PROGRESS NOTES
End of Shift Note    Bedside shift change report given to Cirilo HARRIS (oncoming nurse) by Oly Raymond (offgoing nurse). Report included the following information SBAR, Kardex, Intake/Output, MAR, Recent Results, Med Rec Status and Cardiac Rhythm NSR/sinus julian    Shift worked:  8006-0925     Shift summary and any significant changes:     RR called for chest pain, chest pain resolved with out intervention, troponin and cardiac enzymes sent. New PRN hydralazine order. No further complaints of chest pain     Concerns for physician to address:        Zone phone for oncoming shift:   [phone]       Activity:  Activity Level: Up with Assistance  Number times ambulated in hallways past shift: 0  Number of times OOB to chair past shift: 0    Cardiac:   Cardiac Monitoring: Yes      Cardiac Rhythm: Sinus bradycardia    Access:   Current line(s): PIV     Genitourinary:   Urinary status: voiding and external catheter    Respiratory:   O2 Device: Room air  Chronic home O2 use?: NO  Incentive spirometer at bedside: NO     GI:  Last Bowel Movement Date: 12/30/21  Current diet:  DIET CARDIAC Regular  DIET ONE TIME MESSAGE  Passing flatus: YES  Tolerating current diet: YES  % Diet Eaten: 50 %    Pain Management:   Patient states pain is manageable on current regimen: YES    Skin:  Cordell Score: 19  Interventions: increase time out of bed and foam dressing    Patient Safety:  Fall Score:  Total Score: 3  Interventions: bed/chair alarm, assistive device (walker, cane, etc) and pt to call before getting OOB  High Fall Risk: Yes    Length of Stay:  Expected LOS: - - -  Actual LOS: [facility]

## 2021-01-03 NOTE — PROGRESS NOTES
RAPID RESPONSE TEAM - follow up    Rounded on patient due to recent RRT. Discussed with primary RN. No acute concerns, VSS, MEWS 1. No RRT interventions indicated at this time. Please call with any questions or concerns.      Anna Whyte

## 2021-01-03 NOTE — PROGRESS NOTES
RAPID RESPONSE TEAM    Responded to overhead adult rapid response chest pain to room 3240. Patient alert, in bed, NAD, c/o 10/10 sharp chest pain pointing to the middle of the chest, reproducable pain to palpation. STAT EKG completed, NP Purveyor arrived to bedside, reviewed EKG, no changes noted. STAT troponin and cardiac enzymes ordered. Chest pain relieved with out any intervention. Patient to remain in room at this time. Patient Vitals for the past 12 hrs:   Temp Pulse Resp BP SpO2   01/03/21 0041  66 15 (!) 169/86 98 %   01/03/21 0029 97.5 °F (36.4 °C) 79 18 (!) 179/91 98 %   01/02/21 2313 98 °F (36.7 °C) 70 16 (!) 147/83 98 %   01/02/21 2025 97.7 °F (36.5 °C) 63 16 (!) 145/76 98 %   01/02/21 1547 97.6 °F (36.4 °C) 68 16 (!) 146/75 98 %     ADDENDUM:  0404: Rounded on patient d/t recent rapid response, no more complaints of chest pain. Patient resting at this time. Lab Results   Component Value Date/Time     (H) 01/03/2021 12:53 AM    Troponin-I, Qt. <0.05 01/03/2021 12:53 AM         Please call with any needs or concerns.      Estevan Hyde  Rapid Response JADE Zapata

## 2021-01-03 NOTE — PROGRESS NOTES
I reviewed pertinent labs and imaging, and discussed /agreed on the plan of care with Dr. Mainor Hogue      Hospitalist Progress Note    NAME: King Sharma   :  1934   MRN:  [id number]       Assessment / Plan:    Leukocytosis  - WBC sl up to 15.5   - afebrile   - on dexamethsone   - monitor     Chest pain   - RRT overnight for c/o CP   - pt states noticed it after lunch yesterday but only lasted a few minutes   - last night she states persisted then went away   - Trop neg x 1 will trend   -- no chest pain during exam     Constipation   - no BM x 4 days   - cont with Miralax today   - states + flatus     COVID positive   -asymptomatic screening for SNF placement  - pt asymptomatic - has dry cough , no fever ,on room air   - Pt positive , COVID labs done    check LFT norms;  CRP 4.43 / Ferritin  399 /  /  D-dimer 0.40/ Fibrinogen 562/  Pro calcitonin <0.05 Troponin  Vitamin C 500mg po BID ,Zinc 220mg PO BIDv  - pt remains on room air , dry cough , afebrile   - will need Neg Covid test for SNF placement     Generalized weakness  Symptomatic orthostatic hypotension  - admitted after second visit  to ER with c/o generalized weakness / dizziness   - noted to be orthostatic on the    - antihypertensives held till  then resumed   -OOB to chair for meals   -son requesting possible rehab placement since pt lives alone   - PT/OT consulted for opinion   - appreciate CM input     CAD  HTN  - Sbp 130-160/71  - DC dilt today HR 45-53 overnight   - HR now at 62-79   - added amlodipine  Mg for -170s   - cont to monitor   -Cont to hold [place] for Cr. 1.33  Spoke to adalgisa Chisholm and updated on pt condition     Code Status: Full  Surrogate Decision Maker adalgisa Chisholm [phone]    Body mass index is 23.4 kg/m². Code status: Full  Prophylaxis: Lovenox  Recommended Disposition: TBD     Subjective:     Chief Complaint / Reason for Physician Visit  \"my chest was hurting last night   \".   Discussed with RN events overnight. Pt denies chest pain now . States dos not want to get up in chair today . Reminded pt that she will be going to rehab to gain strength and this is part of rehab . Will cont to  Encourage pt   Review of Systems:  Symptom Y/N Comments  Symptom Y/N Comments   Fever/Chills n   Chest Pain n    Poor Appetite n   Edema n    Cough y dry  Abdominal Pain n    Sputum n   Joint Pain n    SOB/AGUILERA n   Pruritis/Rash n    Nausea/vomit n   Tolerating PT/OT n    Diarrhea n   Tolerating Diet y    Constipation    Other       Could NOT obtain due to:      Objective:     VITALS:   Last 24hrs VS reviewed since prior progress note. Most recent are:  Patient Vitals for the past 24 hrs:   Temp Pulse Resp BP SpO2   01/03/21 0514 97.5 °F (36.4 °C) 62 18 (!) 159/65 99 %   01/03/21 0059  69 16 (!) 150/80 99 %   01/03/21 0041  66 15 (!) 169/86 98 %   01/03/21 0029 97.5 °F (36.4 °C) 79 18 (!) 179/91 98 %   01/02/21 2313 98 °F (36.7 °C) 70 16 (!) 147/83 98 %   01/02/21 2025 97.7 °F (36.5 °C) 63 16 (!) 145/76 98 %   01/02/21 1547 97.6 °F (36.4 °C) 68 16 (!) 146/75 98 %   01/02/21 1211 97.6 °F (36.4 °C) 68 16 (!) 159/69 98 %   01/02/21 1000  88  (!) 159/85 93 %   01/02/21 0955  82  (!) 157/91 97 %   01/02/21 0952  68  132/70 98 %   01/02/21 0854 97.5 °F (36.4 °C) 70 16 135/74 98 %       Intake/Output Summary (Last 24 hours) at 1/3/2021 0804  Last data filed at 1/3/2021 0516  Gross per 24 hour   Intake    Output 550 ml   Net -550 ml        I had a face to face encounter and independently examined this patient on 1/3/2021, as outlined below:  PHYSICAL EXAM:  General: Thin . Alert, cooperative, pleasant no acute distress    EENT:  EOMI. Anicteric sclerae. MMM poor dentition   Resp:   no wheezing or rales. No accessory muscle use, room air   CV:  S1S2,  No edema ,   GI:  Soft,  Non tender. +Bowel sounds + flatus constipation   Neurologic:  Alert and oriented X 3, normal speech,   Psych:   . Not anxious nor agitated ? manipulative  Skin:  No rashes. No jaundice Dry ,      Reviewed most current lab test results and cultures  YES  Reviewed most current radiology test results   YES  Review and summation of old records today    NO  Reviewed patient's current orders and MAR    YES  PMH/SH reviewed - no change compared to H&P  ________________________________________________________________________  Care Plan discussed with:    Comments   Patient x    Family  x    RN     Care Manager x    Consultant                        Multidiciplinary team rounds were held today with , nursing, pharmacist and clinical coordinator. Patient's plan of care was discussed; medications were reviewed and discharge planning was addressed. ________________________________________________________________________  Total NON critical care TIME: 30   Minutes    Total CRITICAL CARE TIME Spent:   Minutes non procedure based      Comments   >50% of visit spent in counseling and coordination of care     ________________________________________________________________________  Mayi Lesser. Russ Vasquez NP     Procedures: see electronic medical records for all procedures/Xrays and details which were not copied into this note but were reviewed prior to creation of Plan. LABS:  I reviewed today's most current labs and imaging studies. Pertinent labs include:  Recent Labs     01/03/21 0053 01/02/21 0339 01/01/21 0259   WBC 15.5* 11.8* 5.3   HGB 13.4 12.3 11.7   HCT 40.7 36.9 35.2    338 275     Recent Labs     01/03/21 0053 01/02/21 0339 01/01/21 0259 12/31/20  1120    137 136 135*   K 4.6 4.0 4.2 3.9    108 106 105   CO2 23 21 24 24   * 130* 146* 83   BUN 33* 28* 21* 15   CREA 1.33* 1.31* 1.23* 1.18*   CA 9.8 9.0 8.7 9.0   MG  --   --   --  1.9   ALB 3.3* 2.9* 2.8* 3.2*   TBILI 0.3 0.7 0.4 0.5   ALT 46 29 27 28   INR 1.0 1.0 1.0 1.0       Signed: Yolette Vasquez NP

## 2021-01-03 NOTE — PROGRESS NOTES
Rapid Response Team Note    Called by RN at 0037 to see patient for chest pain stat. Upon entering the room the patient is noted to be in bed, in no acute distress, blood pressure elevated 170/84. Patient complains of chest pain but states it is much better now. She describes the pain as a dull stabbing pain. When I palpated patient's chest she stated that that causes pain as well. Patient's nurse reports that patient was complaining of having chest pain and was rating it 10 out of 10    Jude Jha is a 80 y.o. [address] female patient had been admitted for generalized weakness with symptomatic orthostatic hypotension. Her Covid test was positive. Of note is that she had visited the emergency room on December 26 and she was noted to have been orthostatic at that time. She had been discharged home at that time with a reduction in her losartan and was instructed to take losartan 25 mg by mouth daily.          Allergies   Allergen Reactions    Codeine Hives    Pcn [Penicillins] Hives    Sulfa (Sulfonamide Antibiotics) Hives          Current Facility-Administered Medications:     acetaminophen (TYLENOL) tablet 650 mg, 650 mg, Oral, Q6H PRN, 650 mg at 01/02/21 1555 **OR** acetaminophen (TYLENOL) suppository 650 mg, 650 mg, Rectal, Q6H PRN, Amuquandoh, Yolette A., NP    guaiFENesin-dextromethorphan (ROBITUSSIN DM) 100-10 mg/5 mL syrup 5 mL, 5 mL, Oral, Q4H PRN, Amuquandoh, Yolette A., NP, 5 mL at 01/02/21 1555    dexAMETHasone (DECADRON) tablet 6 mg, 6 mg, Oral, DAILY, Amuquandoh, Yolette A., NP, 6 mg at 01/02/21 8750    cholecalciferol (VITAMIN D3) (1000 Units /25 mcg) tablet 2 Tab, 2,000 Units, Oral, DAILY, Amuquandoh, Yolette A., NP, 2 Tab at 01/02/21 0856    enoxaparin (LOVENOX) injection 30 mg, 30 mg, SubCUTAneous, Q12H, Amuquandoh, Yolette A., NP, 30 mg at 01/02/21 2155    ascorbic acid (vitamin C) (VITAMIN C) tablet 500 mg, 500 mg, Oral, BID, Ru Pena MD, 500 mg at 01/02/21 1548    zinc sulfate (ZINCATE) 220 (50) mg capsule 1 Cap, 1 Cap, Oral, Q12H, Mini Bermeo MD, 1 Cap at 01/02/21 2155    [Held by provider] dilTIAZem ER (CARDIZEM CD) capsule 300 mg, 300 mg, Oral, DAILY, Amuquandoh, Yolette A., NP, 300 mg at 12/31/20 1454    benzocaine-menthoL (CHLORASEPTIC MAX) lozenge 1 Lozenge, 1 Lozenge, Oral, Q2H PRN, Amuquandoh, Yolette A., NP, 1 Lozenge at 12/31/20 0833    ibuprofen (MOTRIN) tablet 400 mg, 400 mg, Oral, Q6H PRN, Amuquandoh, Yolette A., NP, 400 mg at 01/01/21 2043    sodium chloride (NS) flush 5-40 mL, 5-40 mL, IntraVENous, Q8H, Flaca Jordan MD, 10 mL at 01/02/21 2156    sodium chloride (NS) flush 5-40 mL, 5-40 mL, IntraVENous, PRN, Carmen Boogie MD    polyethylene glycol (MIRALAX) packet 17 g, 17 g, Oral, DAILY PRN, Carmen Boogie MD, 17 g at 01/02/21 0951    promethazine (PHENERGAN) tablet 12.5 mg, 12.5 mg, Oral, Q6H PRN **OR** ondansetron (ZOFRAN) injection 4 mg, 4 mg, IntraVENous, Q6H PRN, Carmen Boogie MD    influenza vaccine 2020-21 (6 mos+)(PF) (FLUARIX/FLULAVAL/FLUZONE QUAD) injection 0.5 mL, 0.5 mL, IntraMUSCular, PRIOR TO DISCHARGE, Carmen Boogie MD       Visit Vitals  BP (!) 150/80   Pulse 69   Temp 97.5 °F (36.4 °C)   Resp 16   Ht 5' 6\" (1.676 m)   Wt 65.8 kg (145 lb)   SpO2 99%   BMI 23.40 kg/m²          Review of Systems   Respiratory: Positive for cough. Negative for shortness of breath. Cardiovascular: Positive for chest pain. Gastrointestinal: Negative for abdominal pain. Physical Exam  Cardiovascular:      Rate and Rhythm: Normal rate and regular rhythm. Pulmonary:      Effort: Pulmonary effort is normal.   Abdominal:      Palpations: Abdomen is soft. Musculoskeletal:         General: Tenderness (complains of chest tenderness upon palpation) present. Neurological:      Mental Status: She is alert and oriented to person, place, and time.               Pertinent Recent Labs and Xrays:    Recent Labs     01/03/21  0053 01/02/21 0339   WBC 15.5* 11.8*   HGB 13.4 12.3   HCT 40.7 36.9    338     Recent Labs     01/03/21 0053 01/02/21 0339 12/31/20  1120 12/31/20  1120    137   < > 135*   K 4.6 4.0   < > 3.9    108   < > 105   CO2 23 21   < > 24   BUN 33* 28*   < > 15   CREA 1.33* 1.31*   < > 1.18*   * 130*   < > 83   CA 9.8 9.0   < > 9.0   MG  --   --   --  1.9    < > = values in this interval not displayed. Recent Labs     01/03/21 0053 01/02/21 0339   INR 1.0 1.0     No results for input(s): PH, PCO2, PO2 in the last 72 hours. No results for input(s): PHI, PO2I, PCO2I in the last 72 hours.   Recent Labs     01/03/21 0053 12/31/20  1120   *  --    TROIQ <0.05 <0.05     No results found for: GLUCPOC       Recent Imaging studies(If Any)    CXR Results  (Last 48 hours)    None           Echo Results  (Last 48 hours)    None           CT Results  (Last 48 hours)    None                  EKG RESULTS     Procedure Component Value Units Date/Time    EKG, 12 LEAD, INITIAL [[id number]] Collected: 12/29/20 1305    Order Status: Completed Updated: 12/29/20 1600     Ventricular Rate 74 BPM      Atrial Rate 74 BPM      P-R Interval 104 ms      QRS Duration 88 ms      Q-T Interval 364 ms      QTC Calculation (Bezet) 404 ms      Calculated P Axis 26 degrees      Calculated R Axis -27 degrees      Calculated T Axis 10 degrees      Diagnosis --     Sinus rhythm with short [place]  Nonspecific ST and T wave abnormality  Confirmed by Chico Jimenez ([id number]) on 12/29/2020 4:00:16 PM      EKG, 12 LEAD, INITIAL [[id number]] Collected: 12/26/20 1233    Order Status: Completed Updated: 12/27/20 1329     Ventricular Rate 69 BPM      Atrial Rate 69 BPM      P-R Interval 116 ms      QRS Duration 84 ms      Q-T Interval 400 ms      QTC Calculation (Bezet) 428 ms      Calculated P Axis -22 degrees      Calculated R Axis -24 degrees      Calculated T Axis 33 degrees      Diagnosis --     Normal sinus rhythm  Minimal voltage criteria for LVH, may be normal variant  Nonspecific T wave abnormality  Confirmed by Allan Duran ([id number]) on 12/27/2020 1:29:20 PM                 Recent Microbiology Data(If Any)  . All Micro Results     Procedure Component Value Units Date/Time    JALEESA Castrejon URINE [[id number]] Collected: 12/31/20 1456    Order Status: Completed Specimen: Urine Updated: 12/31/20 1830    OSWALD Julian Ao, UR/CSF [[id number]] Collected: 12/31/20 1456    Order Status: Completed Updated: 12/31/20 1621             LAB DATA REVIEWED:    Recent Results (from the past 24 hour(s))   CBC WITH AUTOMATED DIFF    Collection Time: 01/02/21  3:39 AM   Result Value Ref Range    WBC 11.8 (H) 3.6 - 11.0 K/uL    RBC 4.86 3.80 - 5.20 M/uL    HGB 12.3 11.5 - 16.0 g/dL    HCT 36.9 35.0 - 47.0 %    MCV 75.9 (L) 80.0 - 99.0 FL    MCH 25.3 (L) 26.0 - 34.0 PG    MCHC 33.3 30.0 - 36.5 g/dL    RDW 14.3 11.5 - 14.5 %    PLATELET 198 379 - 339 K/uL    MPV 10.6 8.9 - 12.9 FL    NRBC 0.0 0  WBC    ABSOLUTE NRBC 0.00 0.00 - 0.01 K/uL    NEUTROPHILS 84 (H) 32 - 75 %    LYMPHOCYTES 9 (L) 12 - 49 %    MONOCYTES 6 5 - 13 %    EOSINOPHILS 0 0 - 7 %    BASOPHILS 0 0 - 1 %    IMMATURE GRANULOCYTES 1 (H) 0.0 - 0.5 %    ABS. NEUTROPHILS 9.8 (H) 1.8 - 8.0 K/UL    ABS. LYMPHOCYTES 1.1 0.8 - 3.5 K/UL    ABS. MONOCYTES 0.7 0.0 - 1.0 K/UL    ABS. EOSINOPHILS 0.0 0.0 - 0.4 K/UL    ABS. BASOPHILS 0.0 0.0 - 0.1 K/UL    ABS. IMM.  GRANS. 0.2 (H) 0.00 - 0.04 K/UL    DF AUTOMATED     PROTHROMBIN TIME + INR    Collection Time: 01/02/21  3:39 AM   Result Value Ref Range    INR 1.0 0.9 - 1.1      Prothrombin time 10.1 9.0 - 11.1 sec   PTT    Collection Time: 01/02/21  3:39 AM   Result Value Ref Range    aPTT 33.5 (H) 22.1 - 31.0 sec    aPTT, therapeutic range     58.0 - 77.0 SECS   D DIMER    Collection Time: 01/02/21  3:39 AM   Result Value Ref Range    D-dimer 0.33 0.00 - 0.65 mg/L FEU   METABOLIC PANEL, COMPREHENSIVE    Collection Time: 01/02/21  3:39 AM   Result Value Ref Range    Sodium 137 136 - 145 mmol/L    Potassium 4.0 3.5 - 5.1 mmol/L    Chloride 108 97 - 108 mmol/L    CO2 21 21 - 32 mmol/L    Anion gap 8 5 - 15 mmol/L    Glucose 130 (H) 65 - 100 mg/dL    BUN 28 (H) 6 - 20 MG/DL    Creatinine 1.31 (H) 0.55 - 1.02 MG/DL    BUN/Creatinine ratio 21 (H) 12 - 20      GFR est AA 47 (L) >60 ml/min/1.73m2    GFR est non-AA 39 (L) >60 ml/min/1.73m2    Calcium 9.0 8.5 - 10.1 MG/DL    Bilirubin, total 0.7 0.2 - 1.0 MG/DL    ALT (SGPT) 29 12 - 78 U/L    AST (SGOT) 40 (H) 15 - 37 U/L    Alk. phosphatase 51 45 - 117 U/L    Protein, total 6.8 6.4 - 8.2 g/dL    Albumin 2.9 (L) 3.5 - 5.0 g/dL    Globulin 3.9 2.0 - 4.0 g/dL    A-G Ratio 0.7 (L) 1.1 - 2.2     FIBRINOGEN    Collection Time: 01/02/21  3:39 AM   Result Value Ref Range    Fibrinogen 562 (H) 200 - 475 mg/dL   PROTHROMBIN TIME + INR    Collection Time: 01/03/21 12:53 AM   Result Value Ref Range    INR 1.0 0.9 - 1.1      Prothrombin time 10.1 9.0 - 11.1 sec   PTT    Collection Time: 01/03/21 12:53 AM   Result Value Ref Range    aPTT 32.4 (H) 22.1 - 31.0 sec    aPTT, therapeutic range     58.0 - 77.0 SECS   FIBRINOGEN    Collection Time: 01/03/21 12:53 AM   Result Value Ref Range    Fibrinogen 553 (H) 200 - 475 mg/dL   D DIMER    Collection Time: 01/03/21 12:53 AM   Result Value Ref Range    D-dimer 0.39 0.00 - 0.65 mg/L FEU   METABOLIC PANEL, COMPREHENSIVE    Collection Time: 01/03/21 12:53 AM   Result Value Ref Range    Sodium 136 136 - 145 mmol/L    Potassium 4.6 3.5 - 5.1 mmol/L    Chloride 108 97 - 108 mmol/L    CO2 23 21 - 32 mmol/L    Anion gap 5 5 - 15 mmol/L    Glucose 126 (H) 65 - 100 mg/dL    BUN 33 (H) 6 - 20 MG/DL    Creatinine 1.33 (H) 0.55 - 1.02 MG/DL    BUN/Creatinine ratio 25 (H) 12 - 20      GFR est AA 46 (L) >60 ml/min/1.73m2    GFR est non-AA 38 (L) >60 ml/min/1.73m2    Calcium 9.8 8.5 - 10.1 MG/DL    Bilirubin, total 0.3 0.2 - 1.0 MG/DL    ALT (SGPT) 46 12 - 78 U/L    AST (SGOT) 47 (H) 15 - 37 U/L    Alk. phosphatase 62 45 - 117 U/L    Protein, total 8.1 6.4 - 8.2 g/dL    Albumin 3.3 (L) 3.5 - 5.0 g/dL    Globulin 4.8 (H) 2.0 - 4.0 g/dL    A-G Ratio 0.7 (L) 1.1 - 2.2     CBC W/O DIFF    Collection Time: 01/03/21 12:53 AM   Result Value Ref Range    WBC 15.5 (H) 3.6 - 11.0 K/uL    RBC 5.27 (H) 3.80 - 5.20 M/uL    HGB 13.4 11.5 - 16.0 g/dL    HCT 40.7 35.0 - 47.0 %    MCV 77.2 (L) 80.0 - 99.0 FL    MCH 25.4 (L) 26.0 - 34.0 PG    MCHC 32.9 30.0 - 36.5 g/dL    RDW 14.6 (H) 11.5 - 14.5 %    PLATELET 994 996 - 286 K/uL    MPV 10.6 8.9 - 12.9 FL    NRBC 0.0 0  WBC    ABSOLUTE NRBC 0.00 0.00 - 0.01 K/uL   TROPONIN I    Collection Time: 01/03/21 12:53 AM   Result Value Ref Range    Troponin-I, Qt. <0.05 <0.05 ng/mL   CK    Collection Time: 01/03/21 12:53 AM   Result Value Ref Range     (H) 26 - 192 U/L            Assessment, action, and plan    Chest pain  Hypertension    · Likely musculoskeletal /due to excessive cough  · EKG not consistent with acute event  · We will get a troponin level and also get cardiac enzymes  · Hold losartan has been held due to patient risk for orthostatic hypotension  · We will order hydralazine as needed           Signed by:  Lida Meehan DNP, ACNP-BC    Critical care time unrelated to prior notes: 35 minutes    Please note that this note was dictated using Dragon computer voice recognition software. Quite often unanticipated grammatical, syntax, homophones, and other interpretive errors are inadvertently transcribed by the computer software. Please disregard these errors. Please excuse any errors that have escaped final proofreading.

## 2021-01-04 LAB
ALBUMIN SERPL-MCNC: 3.2 G/DL (ref 3.5–5)
ALBUMIN/GLOB SERPL: 0.8 {RATIO} (ref 1.1–2.2)
ALP SERPL-CCNC: 54 U/L (ref 45–117)
ALT SERPL-CCNC: 83 U/L (ref 12–78)
ANION GAP SERPL CALC-SCNC: 6 MMOL/L (ref 5–15)
APTT PPP: 29.2 SEC (ref 22.1–31)
AST SERPL-CCNC: 53 U/L (ref 15–37)
BILIRUB SERPL-MCNC: 0.7 MG/DL (ref 0.2–1)
BUN SERPL-MCNC: 37 MG/DL (ref 6–20)
BUN/CREAT SERPL: 30 (ref 12–20)
CALCIUM SERPL-MCNC: 9.3 MG/DL (ref 8.5–10.1)
CHLORIDE SERPL-SCNC: 107 MMOL/L (ref 97–108)
CO2 SERPL-SCNC: 26 MMOL/L (ref 21–32)
CREAT SERPL-MCNC: 1.25 MG/DL (ref 0.55–1.02)
D DIMER PPP FEU-MCNC: 0.44 MG/L FEU (ref 0–0.65)
ERYTHROCYTE [DISTWIDTH] IN BLOOD BY AUTOMATED COUNT: 14.3 % (ref 11.5–14.5)
FIBRINOGEN PPP-MCNC: 433 MG/DL (ref 200–475)
FLUID CULTURE, SPNG2: NORMAL
GLOBULIN SER CALC-MCNC: 3.8 G/DL (ref 2–4)
GLUCOSE SERPL-MCNC: 98 MG/DL (ref 65–100)
HCT VFR BLD AUTO: 39.9 % (ref 35–47)
HGB BLD-MCNC: 13 G/DL (ref 11.5–16)
INR PPP: 1 (ref 0.9–1.1)
L PNEUMO1 AG UR QL IA: NEGATIVE
MCH RBC QN AUTO: 24.4 PG (ref 26–34)
MCHC RBC AUTO-ENTMCNC: 32.6 G/DL (ref 30–36.5)
MCV RBC AUTO: 74.9 FL (ref 80–99)
NRBC # BLD: 0 K/UL (ref 0–0.01)
NRBC BLD-RTO: 0 PER 100 WBC
ORGANISM ID, SPNG3: NORMAL
PLATELET # BLD AUTO: 442 K/UL (ref 150–400)
PLEASE NOTE, SPNG4: NORMAL
PMV BLD AUTO: 10.4 FL (ref 8.9–12.9)
POTASSIUM SERPL-SCNC: 4.5 MMOL/L (ref 3.5–5.1)
PROT SERPL-MCNC: 7 G/DL (ref 6.4–8.2)
PROTHROMBIN TIME: 10.4 SEC (ref 9–11.1)
RBC # BLD AUTO: 5.33 M/UL (ref 3.8–5.2)
S PNEUM AG SPEC QL LA: NEGATIVE
SODIUM SERPL-SCNC: 139 MMOL/L (ref 136–145)
SPECIMEN SOURCE: NORMAL
SPECIMEN SOURCE: NORMAL
SPECIMEN, SPNG1: NORMAL
THERAPEUTIC RANGE,PTTT: NORMAL SECS (ref 58–77)
WBC # BLD AUTO: 13.9 K/UL (ref 3.6–11)

## 2021-01-04 PROCEDURE — 36415 COLL VENOUS BLD VENIPUNCTURE: CPT

## 2021-01-04 PROCEDURE — 74011250636 HC RX REV CODE- 250/636: Performed by: NURSE PRACTITIONER

## 2021-01-04 PROCEDURE — 65660000000 HC RM CCU STEPDOWN

## 2021-01-04 PROCEDURE — 74011250636 HC RX REV CODE- 250/636: Performed by: FAMILY MEDICINE

## 2021-01-04 PROCEDURE — 74011250637 HC RX REV CODE- 250/637: Performed by: FAMILY MEDICINE

## 2021-01-04 PROCEDURE — 97116 GAIT TRAINING THERAPY: CPT

## 2021-01-04 PROCEDURE — 74011250637 HC RX REV CODE- 250/637: Performed by: NURSE PRACTITIONER

## 2021-01-04 PROCEDURE — 97535 SELF CARE MNGMENT TRAINING: CPT | Performed by: OCCUPATIONAL THERAPIST

## 2021-01-04 PROCEDURE — 80053 COMPREHEN METABOLIC PANEL: CPT

## 2021-01-04 PROCEDURE — 97530 THERAPEUTIC ACTIVITIES: CPT

## 2021-01-04 PROCEDURE — 85379 FIBRIN DEGRADATION QUANT: CPT

## 2021-01-04 PROCEDURE — 85730 THROMBOPLASTIN TIME PARTIAL: CPT

## 2021-01-04 PROCEDURE — 85027 COMPLETE CBC AUTOMATED: CPT

## 2021-01-04 PROCEDURE — 85384 FIBRINOGEN ACTIVITY: CPT

## 2021-01-04 PROCEDURE — 97530 THERAPEUTIC ACTIVITIES: CPT | Performed by: OCCUPATIONAL THERAPIST

## 2021-01-04 PROCEDURE — 85610 PROTHROMBIN TIME: CPT

## 2021-01-04 RX ORDER — ADHESIVE BANDAGE
30 BANDAGE TOPICAL ONCE
Status: COMPLETED | OUTPATIENT
Start: 2021-01-04 | End: 2021-01-04

## 2021-01-04 RX ORDER — POLYETHYLENE GLYCOL 3350 17 G/17G
17 POWDER, FOR SOLUTION ORAL DAILY
Status: DISCONTINUED | OUTPATIENT
Start: 2021-01-04 | End: 2021-01-07 | Stop reason: HOSPADM

## 2021-01-04 RX ADMIN — Medication 10 ML: at 21:31

## 2021-01-04 RX ADMIN — GUAIFENESIN AND DEXTROMETHORPHAN 5 ML: 100; 10 SYRUP ORAL at 19:13

## 2021-01-04 RX ADMIN — POLYETHYLENE GLYCOL 3350 17 G: 17 POWDER, FOR SOLUTION ORAL at 10:43

## 2021-01-04 RX ADMIN — CHOLECALCIFEROL TAB 25 MCG (1000 UNIT) 2 TABLET: 25 TAB at 10:42

## 2021-01-04 RX ADMIN — OXYCODONE HYDROCHLORIDE AND ACETAMINOPHEN 500 MG: 500 TABLET ORAL at 10:43

## 2021-01-04 RX ADMIN — Medication 10 ML: at 06:02

## 2021-01-04 RX ADMIN — ZINC SULFATE 220 MG (50 MG) CAPSULE 1 CAPSULE: CAPSULE at 21:31

## 2021-01-04 RX ADMIN — MAGNESIUM HYDROXIDE 30 ML: 400 SUSPENSION ORAL at 10:43

## 2021-01-04 RX ADMIN — AMLODIPINE BESYLATE 5 MG: 5 TABLET ORAL at 10:43

## 2021-01-04 RX ADMIN — GUAIFENESIN AND DEXTROMETHORPHAN 5 ML: 100; 10 SYRUP ORAL at 11:38

## 2021-01-04 RX ADMIN — ZINC SULFATE 220 MG (50 MG) CAPSULE 1 CAPSULE: CAPSULE at 10:43

## 2021-01-04 RX ADMIN — DEXAMETHASONE 6 MG: 4 TABLET ORAL at 10:43

## 2021-01-04 RX ADMIN — ENOXAPARIN SODIUM 30 MG: 30 INJECTION SUBCUTANEOUS at 10:44

## 2021-01-04 RX ADMIN — OXYCODONE HYDROCHLORIDE AND ACETAMINOPHEN 500 MG: 500 TABLET ORAL at 18:51

## 2021-01-04 NOTE — PROGRESS NOTES
Problem: Mobility Impaired (Adult and Pediatric)  Goal: *Acute Goals and Plan of Care (Insert Text)  Description: FUNCTIONAL STATUS PRIOR TO ADMISSION: Pt reports ambulation with use of RW. Denies history of falls. Reports increased weakness/difficulty caring for herself over previous 1 month. Pt reports x1 month of increased dizziness with mobility, stating she was mostly spending the day in bed as a result. HOME SUPPORT PRIOR TO ADMISSION: The patient lives alone. Son lives in the area and assists as needed. Pt reports she previously had caregiver 5 days/wk for 5 hrs however caregiver has stopped coming to her home. Physical Therapy Goals  Initiated 12/30/2020  1. Patient will move from supine to sit and sit to supine , scoot up and down, and roll side to side in bed with supervision/set-up within 7 day(s). 2.  Patient will transfer from bed to chair and chair to bed with supervision/set-up using the least restrictive device within 7 day(s). 3.  Patient will perform sit to stand with supervision/set-up within 7 day(s). 4.  Patient will ambulate with contact guard assist for 25 feet with the least restrictive device within 7 day(s). Outcome: Progressing Towards Goal   PHYSICAL THERAPY TREATMENT  Patient: Eufemia Vásquez (80 y.o. female)  Date: 1/4/2021  Diagnosis: Generalized weakness [R53.1]  Orthostatic hypotension [I95.1] <principal problem not specified>       Precautions: Fall, Contact(Droplet Plus)  Chart, physical therapy assessment, plan of care and goals were reviewed. ASSESSMENT  Patient continues with skilled PT services and is progressing towards goals. She had a very large BM today and was assisted with cleaning with sitting rest needed. She was able to ambulate 5 and 10 feet today with min a using RW. Gait was slow with cues needed to keep walker closer and to stand more upright.  Sit to stand improved to cg assist with cues to push from the surface or armrests rather than pull up on the RW. Current Level of Function Impacting Discharge (mobility/balance): cg to min a    Other factors to consider for discharge: lives alone; limited home support; currently a high fall risk         PLAN :  Patient continues to benefit from skilled intervention to address the above impairments. Continue treatment per established plan of care. to address goals. Recommendation for discharge: (in order for the patient to meet his/her long term goals)  Therapy up to 5 days/week in SNF setting    This discharge recommendation:  Has been made in collaboration with the attending provider and/or case management    IF patient discharges home will need the following DME: patient owns DME required for discharge, but needs 24/7 supervision/assistance at this time. SUBJECTIVE:   Patient stated I just finished having my 1st BM since Christmas    OBJECTIVE DATA SUMMARY:   Critical Behavior:  Neurologic State: Alert  Orientation Level: Oriented X4  Cognition: Appropriate for age attention/concentration, Follows commands  Safety/Judgement: Decreased awareness of need for safety  Functional Mobility Training:  Bed Mobility:  Rolling: (patient on BSC when PT arrived)        Scooting: Supervision        Transfers:  Sit to Stand: Contact guard assistance  Stand to Sit: Contact guard assistance        Bed to Chair: Minimum assistance(ambulating with a RW)                    Balance:  Sitting: Intact  Standing: Impaired; With support  Standing - Static: Good  Standing - Dynamic : Fair  Ambulation/Gait Training:  Distance (ft): 15 Feet (ft)(5 and 10 feet)  Assistive Device: Gait belt;Walker, rolling  Ambulation - Level of Assistance: Minimal assistance        Gait Abnormalities: Decreased step clearance; Path deviations; Step to gait(fwd trunk lean)  Right Side Weight Bearing: Full  Left Side Weight Bearing: Full  Base of Support: Widened     Speed/Roseline: Slow  Step Length: Right shortened;Left shortened Interventions: Safety awareness training;Verbal cues        Pain Rating:  Lower back pain with radiation into legs with standing - chronic lower back pain per patient. Activity Tolerance:   Fair, SpO2 stable on RA, and requires rest breaks    After treatment patient left in no apparent distress:   Sitting in chair, Call bell within reach, and tray table in front. COMMUNICATION/COLLABORATION:   The patients plan of care was discussed with: Occupational therapist and Registered nurse.      Memo Robles PT   Time Calculation: 24 mins

## 2021-01-04 NOTE — PROGRESS NOTES
End of Shift Note    Bedside shift change report given to Aisle50 (oncoming nurse) by Benton Steinberg RN (offgoing nurse). Report included the following information SBAR and Kardex    Shift worked:  P     Shift summary and any significant changes:     patient wants to poop, declined any offerings made to help     Concerns for physician to address:  encourage patient to sit up and poop     Zone phone for oncoming shift:   [phone]       Activity:  Activity Level: Up with Assistance  Number times ambulated in hallways past shift: 0  Number of times OOB to chair past shift: 0    Cardiac:   Cardiac Monitoring: Yes      Cardiac Rhythm: Normal sinus rhythm    Access:   Current line(s): PIV     Genitourinary:   Urinary status: voiding and external catheter    Respiratory:   O2 Device: Room air  Chronic home O2 use?: NO  Incentive spirometer at bedside: NO     GI:  Last Bowel Movement Date: 01/03/21  Current diet:  DIET CARDIAC Regular  DIET ONE TIME MESSAGE  Passing flatus: YES  Tolerating current diet: YES  % Diet Eaten: 100 %    Pain Management:   Patient states pain is manageable on current regimen: YES    Skin:  Cordell Score: 19  Interventions: float heels, increase time out of bed, PT/OT consult, limit briefs and internal/external urinary devices    Patient Safety:  Fall Score:  Total Score: 3  Interventions: assistive device (walker, cane, etc), gripper socks, pt to call before getting OOB, stay with me (per policy) and gait belt  High Fall Risk: Yes    Length of Stay:  Expected LOS: - - -  Actual LOS: [address] RN

## 2021-01-04 NOTE — PROGRESS NOTES
RAPID RESPONSE TEAM- Follow Up    Rounded on patient due to recent rapid response chest pain. Discussed with primary RN, Mckayla Wolfe. No acute concerns, VSS, MEWS 1. No further c/o chest pain. Patient Vitals for the past 12 hrs:   Temp Pulse Resp BP SpO2   01/03/21 1936 98.4 °F (36.9 °C) 66 18 (!) 155/76 96 %   01/03/21 1617 97.6 °F (36.4 °C) 73 16 (!) 149/72 99 %   01/03/21 1156 97.8 °F (36.6 °C) 75 18 (!) 154/77 98 %     Lab Results   Component Value Date/Time     (H) 01/03/2021 12:53 AM    Troponin-I, Qt. <0.05 01/03/2021 05:44 PM         No RRT interventions indicated at this time. Please call with any questions or concerns.      Giancarlo Pathak  Rapid Response RN  Kennedy Bass

## 2021-01-04 NOTE — PROGRESS NOTES
I reviewed pertinent labs and imaging, and discussed /agreed on the plan of care with Dr. Wells [place]      Hospitalist Progress Note    NAME: Linda Vega   :  1934   MRN:  [id number]       Assessment / Plan:    Leukocytosis  - WBC sl up to 13.9  - afebrile   - on dexamethsone   - monitor     Chest pain   - RRT overnight for c/o CP   - pt states noticed it after lunch yesterday but only lasted a few minutes   - last night she states persisted then went away   - Trop neg x 3   -- no chest pain during exam     Constipation   - added MOM once today   - changed miralax to daily   - no BM x 4 days   - states + flatus     COVID positive   -asymptomatic screening for SNF placement  - pt asymptomatic - has dry cough , no fever ,on room air    check LFT norms;  CRP 4.43 / Ferritin  399 /  /  D-dimer 0.44/ Fibrinogen 433/  Pro calcitonin <0.05 Troponin  Vitamin C 500mg po BID ,Zinc 220mg PO BIDv  - pt remains on room air , dry cough , afebrile   - will need Neg Covid test for SNF placement     Generalized weakness- stable    Symptomatic orthostatic hypotension- resolved   - admitted after second visit  to ER with c/o generalized weakness / dizziness   - noted to be orthostatic on the    - antihypertensives held till  then resumed   -OOB to chair for meals   -son requesting possible rehab placement since pt lives alone   - PT/OT consulted for opinion   - appreciate CM input     CAD  HTN  - HR now at 62-79   - added amlodipine 5 Mg for -150s   -Cont to hold [place] for Cr. 1.33  -- cont to monitor       Spoke to son Tammie Rosenberg and updated on pt condition   Pt remains stable on room air     Code Status: Full  Surrogate Decision Maker adalgisa Rosenberg [phone]    Body mass index is 23.4 kg/m². Code status: Full  Prophylaxis: Lovenox  Recommended Disposition: TBD     Subjective:     Chief Complaint / Reason for Physician Visit  \" when do I get to go to rehab    \".   Discussed with RN events overnight. Pt concerned about being DC discussed process with her . Will have CM talk with son about choices available since she is covid positive . Review of Systems:  Symptom Y/N Comments  Symptom Y/N Comments   Fever/Chills n   Chest Pain n    Poor Appetite n   Edema n    Cough y dry  Abdominal Pain n    Sputum n   Joint Pain n    SOB/AGUILERA n   Pruritis/Rash n    Nausea/vomit n   Tolerating PT/OT n    Diarrhea n   Tolerating Diet y    Constipation    Other       Could NOT obtain due to:      Objective:     VITALS:   Last 24hrs VS reviewed since prior progress note. Most recent are:  Patient Vitals for the past 24 hrs:   Temp Pulse Resp BP SpO2   01/04/21 0834 97.7 °F (36.5 °C)   (!) 142/97    01/04/21 0308 97.7 °F (36.5 °C) 67 16 (!) 144/81 96 %   01/04/21 0007 97.5 °F (36.4 °C) 62 16 (!) 149/80 97 %   01/03/21 1936 98.4 °F (36.9 °C) 66 18 (!) 155/76 96 %   01/03/21 1617 97.6 °F (36.4 °C) 73 16 (!) 149/72 99 %   01/03/21 1156 97.8 °F (36.6 °C) 75 18 (!) 154/77 98 %       Intake/Output Summary (Last 24 hours) at 1/4/2021 0908  Last data filed at 1/4/2021 0604  Gross per 24 hour   Intake    Output 600 ml   Net -600 ml        I had a face to face encounter and independently examined this patient on 1/4/2021, as outlined below:  PHYSICAL EXAM:  General: Thin . Alert, cooperative, pleasant no acute distress    EENT:  EOMI. Anicteric sclerae. MMM poor dentition   Resp:   no wheezing or rales. No accessory muscle use, room air , dry cough   CV:  S1S2,  No edema ,   GI:  Soft,  Non tender. +Bowel sounds + flatus constipation   Neurologic:  Alert and oriented X 3, normal speech,   Psych:   . Not anxious nor agitated ? manipulative  Skin:  No rashes.   No jaundice Dry ,      Reviewed most current lab test results and cultures  YES  Reviewed most current radiology test results   YES  Review and summation of old records today    NO  Reviewed patient's current orders and MAR    YES  PMH/SH reviewed - no change compared to H&P  ________________________________________________________________________  Care Plan discussed with:    Comments   Patient x    Family  x    RN x    Care Manager x    Consultant                        Multidiciplinary team rounds were held today with , nursing, pharmacist and clinical coordinator. Patient's plan of care was discussed; medications were reviewed and discharge planning was addressed. ________________________________________________________________________  Total NON critical care TIME: 30   Minutes    Total CRITICAL CARE TIME Spent:   Minutes non procedure based      Comments   >50% of visit spent in counseling and coordination of care     ________________________________________________________________________  Josemanuel Warren. Dorian Ireland NP     Procedures: see electronic medical records for all procedures/Xrays and details which were not copied into this note but were reviewed prior to creation of Plan. LABS:  I reviewed today's most current labs and imaging studies. Pertinent labs include:  Recent Labs     01/04/21 0615 01/03/21 0053 01/02/21 0339   WBC 13.9* 15.5* 11.8*   HGB 13.0 13.4 12.3   HCT 39.9 40.7 36.9   * 391 338     Recent Labs     01/04/21 0615 01/03/21 0053 01/02/21  0339    136 137   K 4.5 4.6 4.0    108 108   CO2 26 23 21   GLU 98 126* 130*   BUN 37* 33* 28*   CREA 1.25* 1.33* 1.31*   CA 9.3 9.8 9.0   ALB 3.2* 3.3* 2.9*   TBILI 0.7 0.3 0.7   ALT 83* 46 29   INR 1.0 1.0 1.0       Signed: Yolette Ireland, NP

## 2021-01-04 NOTE — PROGRESS NOTES
Problem: Self Care Deficits Care Plan (Adult)  Goal: *Acute Goals and Plan of Care (Insert Text)  Description:   FUNCTIONAL STATUS PRIOR TO ADMISSION:Pt reports ambulation with use of RW. Denies history of falls. Reports increased weakness/difficulty caring for herself over previous 1 month. Pt reports x1 month of increased dizziness with mobility, stating she was mostly spending the day in bed as a result. Admitted to ER for same symptoms 12-26; meds changed, went home and continued to feel worse. HOME SUPPORT PRIOR TO ADMISSION: The patient lives alone. Son lives in the area and assists as needed. Pt reports she previously had caregiver 5 days/wk for 5 hrs however caregiver has stopped coming to her home. Occupational Therapy Goals  Initiated 12/30/2020  1. Patient will perform standing grooming with supervision/set-up within 7 day(s). 2.  Patient will perform lower body dressing with minimal assistance/contact guard assist within 7 day(s). 3.  Patient will perform bathing with minimal assistance/contact guard assist within 7 day(s). 4.  Patient will perform toilet transfers with minimal assistance/contact guard assist within 7 day(s). 5.  Patient will perform all aspects of toileting with minimal assistance/contact guard assist within 7 day(s). 6.  Patient will participate in upper extremity therapeutic exercise/activities with supervision/set-up for 5 minutes within 7 day(s). 7.  Patient will utilize energy conservation, pain management, fall prevention and hypotension safety precaution techniques during functional activities with verbal and visual cues within 7 day(s). Outcome: Progressing Towards Goal    OCCUPATIONAL THERAPY TREATMENT  Patient:  Anna Castaneda (80 y.o. female)  Date: 1/4/2021  Diagnosis: Generalized weakness [R53.1]  Orthostatic hypotension [I95.1] <principal problem not specified>       Precautions: Fall  Chart, occupational therapy assessment, plan of care, and goals were reviewed. ASSESSMENT  Patient motivated and cooperative t/o session. She is demonstrating steady functional progress, with improving strength, balance and activity tolerance. Overall she was CGA for sit to stand transfers, ambulated with a RW and min A, she was CGA for standing grooming and she was supervision to [place]  for toileting. Cueing needed for safety during transfers, but patient receptive. BP stable with positional changes and SpO2 was stable on RA during activity. At this time she remains appropriate for SNF rehab after discharge. PLAN :  Patient continues to benefit from skilled intervention to address the above impairments. Continue treatment per established plan of care. to address goals. Recommendation for discharge: (in order for the patient to meet his/her long term goals)  Therapy up to 5 days/week in SNF setting      Equipment recommendations for successful discharge (if) home:TBD         OBJECTIVE DATA SUMMARY:   Cognitive/Behavioral Status:  Neurologic State: Alert  Orientation Level: Oriented X4  Cognition: Appropriate for age attention/concentration; Follows commands        Safety/Judgement: Decreased awareness of need for safety    Functional Mobility and Transfers for ADLs:  Bed Mobility:  Scooting: Supervision    Transfers:  Sit to Stand: Contact guard assistance  Functional Transfers  Toilet Transfer : Contact guard assistance  Cues: Tactile cues provided;Verbal cues provided;Visual cues provided  Adaptive Equipment: Bedside commode  Bed to Chair: Minimum assistance(ambulating with a RW)    Balance:  Sitting: Intact  Standing: Impaired; With support  Standing - Static: Good  Standing - Dynamic : Fair    ADL Intervention:  Grooming  Position Performed: Standing  Washing Hands: Contact guard assistance    Toileting  Toileting Assistance: Contact guard assistance  Bladder Hygiene: Supervision;Set-up  Bowel Hygiene: Contact guard assistance  Clothing Management: Contact guard assistance  Cues: Tactile cues provided;Verbal cues provided;Visual cues provided    Cognitive Retraining  Safety/Judgement: Decreased awareness of need for safety      Activity Tolerance:   Fair   BP stable in sitting and standing, despite patient c/o dizziness. SpO2 stable on RA during activity.        After treatment patient left in no apparent distress:   Sitting in chair and Call bell within reach    COMMUNICATION/COLLABORATION:   The patients plan of care was discussed with: Physical Therapist and Registered Nurse    Sav Staff, OTR/L  Time Calculation: 25 mins

## 2021-01-04 NOTE — PROGRESS NOTES
RAPID RESPONSE TEAM follow-up    Rounded on patient d/t recent RRT for chest pain. Discussed with primary nurse. No acute concerns. EKG and Trop negative. No RRT interventions currently indicated. Please call back if needed.       Briseida Lynch RN  RRT, [phone]

## 2021-01-05 LAB
ALBUMIN SERPL-MCNC: 3.2 G/DL (ref 3.5–5)
ALBUMIN/GLOB SERPL: 0.8 {RATIO} (ref 1.1–2.2)
ALP SERPL-CCNC: 54 U/L (ref 45–117)
ALT SERPL-CCNC: 82 U/L (ref 12–78)
ANION GAP SERPL CALC-SCNC: 6 MMOL/L (ref 5–15)
APTT PPP: 28.3 SEC (ref 22.1–31)
AST SERPL-CCNC: 36 U/L (ref 15–37)
BILIRUB SERPL-MCNC: 0.7 MG/DL (ref 0.2–1)
BUN SERPL-MCNC: 33 MG/DL (ref 6–20)
BUN/CREAT SERPL: 31 (ref 12–20)
CALCIUM SERPL-MCNC: 9.5 MG/DL (ref 8.5–10.1)
CHLORIDE SERPL-SCNC: 107 MMOL/L (ref 97–108)
CO2 SERPL-SCNC: 24 MMOL/L (ref 21–32)
CREAT SERPL-MCNC: 1.07 MG/DL (ref 0.55–1.02)
D DIMER PPP FEU-MCNC: 0.52 MG/L FEU (ref 0–0.65)
ERYTHROCYTE [DISTWIDTH] IN BLOOD BY AUTOMATED COUNT: 14.4 % (ref 11.5–14.5)
FIBRINOGEN PPP-MCNC: 450 MG/DL (ref 200–475)
GLOBULIN SER CALC-MCNC: 4.1 G/DL (ref 2–4)
GLUCOSE SERPL-MCNC: 101 MG/DL (ref 65–100)
HCT VFR BLD AUTO: 40.3 % (ref 35–47)
HGB BLD-MCNC: 13.3 G/DL (ref 11.5–16)
INR PPP: 1 (ref 0.9–1.1)
MCH RBC QN AUTO: 25 PG (ref 26–34)
MCHC RBC AUTO-ENTMCNC: 33 G/DL (ref 30–36.5)
MCV RBC AUTO: 75.8 FL (ref 80–99)
NRBC # BLD: 0 K/UL (ref 0–0.01)
NRBC BLD-RTO: 0 PER 100 WBC
PLATELET # BLD AUTO: 459 K/UL (ref 150–400)
PMV BLD AUTO: 10.8 FL (ref 8.9–12.9)
POTASSIUM SERPL-SCNC: 4.5 MMOL/L (ref 3.5–5.1)
PROT SERPL-MCNC: 7.3 G/DL (ref 6.4–8.2)
PROTHROMBIN TIME: 10.4 SEC (ref 9–11.1)
RBC # BLD AUTO: 5.32 M/UL (ref 3.8–5.2)
SODIUM SERPL-SCNC: 137 MMOL/L (ref 136–145)
THERAPEUTIC RANGE,PTTT: NORMAL SECS (ref 58–77)
WBC # BLD AUTO: 18.3 K/UL (ref 3.6–11)

## 2021-01-05 PROCEDURE — 85379 FIBRIN DEGRADATION QUANT: CPT

## 2021-01-05 PROCEDURE — 74011250636 HC RX REV CODE- 250/636: Performed by: NURSE PRACTITIONER

## 2021-01-05 PROCEDURE — 97535 SELF CARE MNGMENT TRAINING: CPT

## 2021-01-05 PROCEDURE — 85730 THROMBOPLASTIN TIME PARTIAL: CPT

## 2021-01-05 PROCEDURE — 97530 THERAPEUTIC ACTIVITIES: CPT

## 2021-01-05 PROCEDURE — 65660000000 HC RM CCU STEPDOWN

## 2021-01-05 PROCEDURE — 74011250636 HC RX REV CODE- 250/636: Performed by: FAMILY MEDICINE

## 2021-01-05 PROCEDURE — 85384 FIBRINOGEN ACTIVITY: CPT

## 2021-01-05 PROCEDURE — 74011250637 HC RX REV CODE- 250/637: Performed by: FAMILY MEDICINE

## 2021-01-05 PROCEDURE — 36415 COLL VENOUS BLD VENIPUNCTURE: CPT

## 2021-01-05 PROCEDURE — 80053 COMPREHEN METABOLIC PANEL: CPT

## 2021-01-05 PROCEDURE — 74011250637 HC RX REV CODE- 250/637: Performed by: NURSE PRACTITIONER

## 2021-01-05 PROCEDURE — 85610 PROTHROMBIN TIME: CPT

## 2021-01-05 PROCEDURE — 85027 COMPLETE CBC AUTOMATED: CPT

## 2021-01-05 PROCEDURE — 74011250636 HC RX REV CODE- 250/636: Performed by: INTERNAL MEDICINE

## 2021-01-05 RX ORDER — ENOXAPARIN SODIUM 100 MG/ML
30 INJECTION SUBCUTANEOUS EVERY 12 HOURS
Status: DISCONTINUED | OUTPATIENT
Start: 2021-01-05 | End: 2021-01-07 | Stop reason: HOSPADM

## 2021-01-05 RX ADMIN — ENOXAPARIN SODIUM 30 MG: 30 INJECTION SUBCUTANEOUS at 20:39

## 2021-01-05 RX ADMIN — ENOXAPARIN SODIUM 30 MG: 30 INJECTION SUBCUTANEOUS at 11:27

## 2021-01-05 RX ADMIN — GUAIFENESIN AND DEXTROMETHORPHAN 5 ML: 100; 10 SYRUP ORAL at 11:28

## 2021-01-05 RX ADMIN — ZINC SULFATE 220 MG (50 MG) CAPSULE 1 CAPSULE: CAPSULE at 10:41

## 2021-01-05 RX ADMIN — Medication 10 ML: at 16:28

## 2021-01-05 RX ADMIN — OXYCODONE HYDROCHLORIDE AND ACETAMINOPHEN 500 MG: 500 TABLET ORAL at 10:41

## 2021-01-05 RX ADMIN — IBUPROFEN 400 MG: 400 TABLET, FILM COATED ORAL at 11:28

## 2021-01-05 RX ADMIN — CHOLECALCIFEROL TAB 25 MCG (1000 UNIT) 2 TABLET: 25 TAB at 10:41

## 2021-01-05 RX ADMIN — AMLODIPINE BESYLATE 5 MG: 5 TABLET ORAL at 10:41

## 2021-01-05 RX ADMIN — DEXAMETHASONE 6 MG: 4 TABLET ORAL at 10:40

## 2021-01-05 RX ADMIN — Medication 10 ML: at 05:35

## 2021-01-05 RX ADMIN — HYDRALAZINE HYDROCHLORIDE 10 MG: 20 INJECTION INTRAMUSCULAR; INTRAVENOUS at 00:10

## 2021-01-05 RX ADMIN — Medication 10 ML: at 20:39

## 2021-01-05 NOTE — PROGRESS NOTES
End of Shift Note    Bedside shift change report given to [facility] (oncoming nurse) by Hansa Gonzalez (offgoing nurse). Report included the following information SBAR, Kardex, Intake/Output, MAR and Accordion    Shift worked:  6162-5057     Shift summary and any significant changes:          Concerns for physician to address:       [facility]   [phone]       Activity:  Activity Level: Up with Assistance  Number times ambulated in hallways past shift: 0  Number of times OOB to chair past shift: 1    Cardiac:   Cardiac Monitoring: Yes      Cardiac Rhythm: Normal sinus rhythm    Access:   Current line(s): PIV     Genitourinary:   Urinary status: voiding and external catheter    Respiratory:   O2 Device: Room air  Chronic home O2 use?: NO  Incentive spirometer at bedside: NO     GI:  Last Bowel Movement Date: 01/03/21  Current diet:  DIET CARDIAC Regular  DIET ONE TIME MESSAGE  Passing flatus: YES  Tolerating current diet: YES  % Diet Eaten: 0 %    Pain Management:   Patient states pain is manageable on current regimen: N/A    Skin:  Cordell Score: 19  Interventions: float heels, increase time out of bed and internal/external urinary devices    Patient Safety:  Fall Score:  Total Score: 3  Interventions: assistive device (walker, cane, etc), gripper socks and pt to call before getting OOB  High Fall Risk: Yes    Length of Stay:  Expected LOS: 2d 7h  Actual LOS: [address] [address]

## 2021-01-05 NOTE — PROGRESS NOTES
ANISHA:  -SNF: pending COVID bed availability  -Will need [employer] Auth   -BLS/[facility]  -2nd IM needed    Update: CM sent referrals to additional facilities: [facility], and [place] via [employer]. 11:30am: CM spoke with Pt's son Chel Cedillo regarding discharge plan. Chel Cedillo is aware of Pt not being able to discharge to intial SNF choices due to Pt's COVID status. Pt's son is receptive to [facility] sending referrals to other SNFs at this time. Pt's son is aware of limited SNF options and does not have a preference on SNF. Referrals has been sent to [facility] and [address] will need [facility] restarted       10:00am: Per chart review, Pt tested COVID + prior to discharging to SNF. Initial SNF choices are currently not accepting COVID Pts at this time. CM will discuss other SNF options with family due to Pt's current COVID status.        Angus Rushing, MAYA Barrios [place]

## 2021-01-05 NOTE — PROGRESS NOTES
Problem: Self Care Deficits Care Plan (Adult)  Goal: *Acute Goals and Plan of Care (Insert Text)  Description:   FUNCTIONAL STATUS PRIOR TO ADMISSION:Pt reports ambulation with use of RW. Denies history of falls. Reports increased weakness/difficulty caring for herself over previous 1 month. Pt reports x1 month of increased dizziness with mobility, stating she was mostly spending the day in bed as a result. Admitted to ER for same symptoms 12-26; meds changed, went home and continued to feel worse. HOME SUPPORT PRIOR TO ADMISSION: The patient lives alone. Son lives in the area and assists as needed. Pt reports she previously had caregiver 5 days/wk for 5 hrs however caregiver has stopped coming to her home. Occupational Therapy Goals  Initiated 12/30/2020; Weekly re-eval 1/5/2020- all goals remain appropriate   1. Patient will perform standing grooming with supervision/set-up within 7 day(s). 2.  Patient will perform lower body dressing with minimal assistance/contact guard assist within 7 day(s). 3.  Patient will perform bathing with minimal assistance/contact guard assist within 7 day(s). 4.  Patient will perform toilet transfers with minimal assistance/contact guard assist within 7 day(s). 5.  Patient will perform all aspects of toileting with minimal assistance/contact guard assist within 7 day(s). 6.  Patient will participate in upper extremity therapeutic exercise/activities with supervision/set-up for 5 minutes within 7 day(s). 7.  Patient will utilize energy conservation, pain management, fall prevention and hypotension safety precaution techniques during functional activities with verbal and visual cues within 7 day(s). Outcome: Progressing Towards Goal   OCCUPATIONAL THERAPY TREATMENT/WEEKLY RE-EVALUATION  Patient:  Radha Rosales (80 y.o. female)  Date: 1/5/2021  Diagnosis: Generalized weakness [R53.1]  Orthostatic hypotension [I95.1] <principal problem not specified>       Precautions: Fall, Contact(Droplet Plus)  Chart, occupational therapy assessment, plan of care, and goals were reviewed. ASSESSMENT  Patient continues with skilled OT services and is progressing towards goals. All goals remain appropriate for the patient at this time. Patient continues to present with decreased independence in self-care and functional mobility secondary to general weakness, dizziness, decreased endurance, impaired standing balance, and decreased activity tolerance. Patient was received semisupine in bed and agreeable for therapy. Patient completed bed mobility with min assist to CGA with flattened HOB. Once sitting EOB, patient required rest break secondary to dizziness. VSS throughout session and monitored with activity. BP stable with positional changes and O2 stable on RA. Patient declined going into the bathroom and required min assist when transferring to chair using RW. Verbal cues were provided for hand placement and safety awareness. Once sitting in chair, patient require mod assist to don brief and set-up/supervision to wash face and hands. Patient required rest breaks and additional time when completing all tasks secondary to dizziness and decreased endurance. Patient would continue to benefit from skilled OT during acute hospital stay. Current Level of Function Impacting Discharge (ADLs): mod assist to set-up/supervision for ADLs. Other factors to consider for discharge: below baseline, covid +         PLAN :  Patient continues to benefit from skilled intervention to address the above impairments. Continue treatment per established plan of care. Continue seeing to address goals. Continue seeing patient 3x per week.      Recommendation for discharge: (in order for the patient to meet his/her long term goals)  Therapy up to 5 days/week in SNF setting    This discharge recommendation:  Has been made in collaboration with the attending provider and/or case management    IF patient discharges home will need the following DME: TBD at SNF rehab       SUBJECTIVE:   Patient stated Gilda Solis would really love some candy, I was eating some everyday before coming here.     OBJECTIVE DATA SUMMARY:   Cognitive/Behavioral Status:  Neurologic State: Alert  Orientation Level: Oriented X4  Cognition: Appropriate decision making; Appropriate for age attention/concentration  Perception: Appears intact  Perseveration: No perseveration noted  Safety/Judgement: Awareness of environment; Fall prevention    Functional Mobility and Transfers for ADLs:  Bed Mobility:  Rolling: Contact guard assistance  Supine to Sit: Minimum assistance  Scooting: Contact guard assistance    Transfers:  Sit to Stand: Contact guard assistance  Stand to Sit: Minimum assistance  Bed to Chair: Minimum assistance    Balance:  Sitting: Intact  Standing: Impaired  Standing - Static: Good;Constant support  Standing - Dynamic : Fair;Constant support    ADL Intervention:  Grooming  Position Performed: Seated in chair  Washing Face: Set-up; Supervision  Washing Hands: Set-up; Supervision  Cues: Verbal cues provided    Lower Body Dressing Assistance  Protective Undergarmet: Moderate assistance  Position Performed: Seated in chair;Standing  Cues: Don;Physical assistance; Tactile cues provided;Verbal cues provided    Cognitive Retraining  Safety/Judgement: Awareness of environment; Fall prevention    Pain:  Patient did not c/o pain during session. Activity Tolerance:   Fair, SpO2 stable on RA, and requires rest breaks    After treatment patient left in no apparent distress:   Sitting in chair and Call bell within reach    COMMUNICATION/COLLABORATION:   The patients plan of care was discussed with: Physical therapist and Registered nurse.      JOSE LUIS Gr/L  Time Calculation: 40 mins

## 2021-01-05 NOTE — PROGRESS NOTES
RAPID RESPONSE TEAM- Follow Up     Rounded on patient due to recent rapid response for CP. Lab Results   Component Value Date/Time     (H) 01/03/2021 12:53 AM    Troponin-I, Qt. <0.05 01/03/2021 05:44 PM     Spoke with primary RN Jean Oliver. No acute concerns at this time. VSS. MEWS 1. No RRT interventions indicated at this time. Please call with any questions or concerns.       Render Kristina HANSEN  RRT  Ext. [phone]

## 2021-01-05 NOTE — PROGRESS NOTES
Spiritual Care Assessment/Progress Note  [facility]      NAME: Marina Edouard      MRN: [id number]  AGE: 80 y.o.  SEX: female  Adventism Affiliation: Roman Catholic   Language: English     1/5/2021     Total Time (in minutes): 15     Spiritual Assessment begun in [facility] through conversation with:         [x]Patient        [] Family    [] Friend(s)        Reason for Consult: Initial/Spiritual assessment, patient floor     Spiritual beliefs: (Please include comment if needed)     [x] Identifies with a netta tradition:         [] Supported by a netta community:            [] Claims no spiritual orientation:           [] Seeking spiritual identity:                [] Adheres to an individual form of spirituality:           [] Not able to assess:                           Identified resources for coping:      [x] Prayer                               [] Music                  [] Guided Imagery     [x] Family/friends                 [] Pet visits     [] Devotional reading                         [] Unknown     [] Other:                                               Interventions offered during this visit: (See comments for more details)    Patient Interventions: Affirmation of emotions/emotional suffering, Affirmation of netta, Catharsis/review of pertinent events in supportive environment, Coping skills reviewed/reinforced, Iconic (affirming the presence of God/Higher Power), Initial/Spiritual assessment, patient floor, Normalization of emotional/spiritual concerns, Prayer (assurance of)           Plan of Care:     [] Support spiritual and/or cultural needs    [] Support AMD and/or advance care planning process      [] Support grieving process   [] Coordinate Rites and/or Rituals    [] Coordination with community clergy   [] No spiritual needs identified at this time   [] Detailed Plan of Care below (See Comments)  [] Make referral to Music Therapy  [] Make referral to Pet Therapy [] Make referral to Addiction services  [] Make referral to [facility]  [] Make referral to Spiritual Care Partner  [] No future visits requested        [x] Follow up upon further referrals     Comments: Provided initial spiritual assessment for pt Keri Vu on [address]. Due to Covid-19 protocols attempt was made through telephone. Performed chart review and consult with RN. Introduced  services and processed LOS. Affirmed pt perception of improvement and validation received in progress from consults with MD this morning. Affirmed strong family supports and assured of prayers. Pt does not have a community of netta that offers spiritual support, but God-image and prayer practices are helpful to her. Assured of prayers.      Theo 1 LINA Kim 1 Provider   Paging Service [phone])

## 2021-01-05 NOTE — PROGRESS NOTES
End of Shift Note    Bedside shift change report given to Vjiay Miranda (oncoming nurse) by Chemo Johnson (offgoing nurse). Report included the following information SBAR, Kardex, Intake/Output, MAR, Accordion, Recent Results and Med Rec Status    Shift worked:  0669/1810     Shift summary and any significant changes:          Concerns for physician to address:       Zone phone for oncoming shift:  [phone]       Activity:  Activity Level: Up with Assistance  Number times ambulated in hallways past shift: 0  Number of times OOB to chair past shift: 0    Cardiac:   Cardiac Monitoring: Yes      Cardiac Rhythm: Normal sinus rhythm    Access:   Current line(s): PIV     Genitourinary:   Urinary status: voiding    Respiratory:   O2 Device: Room air  Chronic home O2 use?: NO  Incentive spirometer at bedside: YES     GI:  Last Bowel Movement Date: 12/30/20  Current diet:  DIET CARDIAC Regular  DIET ONE TIME MESSAGE  Passing flatus: YES  Tolerating current diet: YES  % Diet Eaten: 0 %    Pain Management:   Patient states pain is manageable on current regimen: YES    Skin:  Cordell Score: 19  Interventions: float heels and foam dressing    Patient Safety:  Fall Score: Total Score: 3  Interventions: pt to call before getting OOB and stay with me (per policy)  High Fall Risk: Yes    Length of Stay:  Expected LOS: 2d 7h  Actual LOS: [address] [address] [facility]

## 2021-01-05 NOTE — PROGRESS NOTES
I reviewed pertinent labs and imaging, and discussed /agreed on the plan of care with Dr. Belem Malone      Hospitalist Progress Note    NAME: Amanda Briggs   :  1934   MRN:  [id number]       Assessment / Plan:  Generalized weakness- stable    Symptomatic orthostatic hypotension- resolved   admitted after second visit  to ER with c/o generalized weakness / dizziness   noted to be orthostatic on the    antihypertensives held till  then resumed   OOB to chair for meals   son requesting possible rehab placement since pt lives alone   Leukocytosis likely related to steroid use. We will continue to monitor  Atypical chest pain probably related to GI noncardiac we will continue to monitor. Constipation patient is on a bowel regimen now we will continue to monitor. COVID positive , asymptomatic. CAD  HTN  Within acceptable range now we will continue to monitor. Physical therapy and case management inputs appreciated. Referrals has been [facility] to [facility] and [facility]   Code Status: Full  Surrogate Decision Maker adalgisa Zhou [phone]  Body mass index is 23.4 kg/m². Code status: Full  Prophylaxis: Lovenox  Recommended Disposition: TBD     Subjective:     Chief Complaint / Reason for Physician Visit  Patient really want to go to rehab and then home. She does not have any other complaints for me when I see her today. \".  Discussed with RN events overnight. Review of Systems:  Symptom Y/N Comments  Symptom Y/N Comments   Fever/Chills n   Chest Pain n    Poor Appetite n   Edema n    Cough y dry  Abdominal Pain n    Sputum n   Joint Pain n    SOB/AGUILERA n   Pruritis/Rash n    Nausea/vomit n   Tolerating PT/OT n    Diarrhea n   Tolerating Diet y    Constipation    Other       Could NOT obtain due to:      Objective:     VITALS:   Last 24hrs VS reviewed since prior progress note.  Most recent are:  Patient Vitals for the past 24 hrs:   Temp Pulse Resp BP SpO2   21 1622 97.5 °F (36.4 °C) 90 17 (!) 144/78 96 %   01/05/21 1528 — — — (!) 149/71 95 %   01/05/21 1520 — — — 138/84 —   01/05/21 1517 — — — 136/64 98 %   01/05/21 1513 — — — 137/69 —   01/05/21 1253 97.8 °F (36.6 °C) 84 16 136/74 100 %   01/05/21 0819 97.7 °F (36.5 °C) 80 16 139/72 96 %   01/05/21 0349 97.6 °F (36.4 °C) 76 16 (!) 144/76 94 %   01/05/21 0008 97.7 °F (36.5 °C) 74 16 (!) 173/90 97 %   01/04/21 2028 97.7 °F (36.5 °C) 70 16 (!) 154/92 97 %       Intake/Output Summary (Last 24 hours) at 1/5/2021 1718  Last data filed at 1/5/2021 0354  Gross per 24 hour   Intake —   Output 680 ml   Net -680 ml        I had a face to face encounter and independently examined this patient on 1/5/2021, as outlined below:  PHYSICAL EXAM:  General: Thin . Alert, cooperative, pleasant no acute distress    EENT:  EOMI. Anicteric sclerae. MMM poor dentition   Resp:   no wheezing or rales.  No accessory muscle use, room air , dry cough   CV:  S1S2,  No edema ,   GI:  Soft,  Non tender.  +Bowel sounds + flatus constipation   Neurologic:  Alert and oriented X 3, normal speech,   Psych:   . Not anxious nor agitated ? manipulative  Skin:  No rashes.  No jaundice Dry ,      Reviewed most current lab test results and cultures  YES  Reviewed most current radiology test results   YES  Review and summation of old records today    NO  Reviewed patient's current orders and MAR    YES  PMH/SH reviewed - no change compared to H&P  ________________________________________________________________________  Care Plan discussed with:    Comments   Patient x    Family  x    RN x    Care Manager x    Consultant                        Multidiciplinary team rounds were held today with , nursing, pharmacist and clinical coordinator.  Patient's plan of care was discussed; medications were reviewed and discharge planning was addressed.     ________________________________________________________________________  Total NON critical care TIME: 30    Minutes    Total CRITICAL CARE TIME Spent:   Minutes non procedure based      Comments   >50% of visit spent in counseling and coordination of care     ________________________________________________________________________  Cristhian Harper MD     Procedures: see electronic medical records for all procedures/Xrays and details which were not copied into this note but were reviewed prior to creation of Plan. LABS:  I reviewed today's most current labs and imaging studies. Pertinent labs include:  Recent Labs     01/05/21 0536 01/04/21  0615 01/03/21  0053   WBC 18.3* 13.9* 15.5*   HGB 13.3 13.0 13.4   HCT 40.3 39.9 40.7   * 442* 391     Recent Labs     01/05/21 0536 01/04/21  0615 01/03/21  0053    139 136   K 4.5 4.5 4.6    107 108   CO2 24 26 23   * 98 126*   BUN 33* 37* 33*   CREA 1.07* 1.25* 1.33*   CA 9.5 9.3 9.8   ALB 3.2* 3.2* 3.3*   TBILI 0.7 0.7 0.3   ALT 82* 83* 46   INR 1.0 1.0 1.0       Signed:  Cristhian Harper MD

## 2021-01-05 NOTE — PROGRESS NOTES
Pharmacy - Enoxaparin (Lovenox®) Monitoring      Indication: covid19 DVT prophylaxis     Current Dose: Enoxaparin 30 mg subcutaneously every 24 hours    Creatinine Clearance (mL/min): ~ 35.3     Current Weight: 65.8 Kg    Labs:  Recent Labs     01/05/21  0536 01/04/21  0615 01/03/21  0053   CREA 1.07* 1.25* 1.33*   HGB 13.3 13.0 13.4   * 442* 391   INR 1.0 1.0 1.0     Wt Readings from Last 1 Encounters:   12/29/20 65.8 kg (145 lb)     Ht Readings from Last 1 Encounters:   12/29/20 167.6 cm (66\")       Impression/Plan:   Scr is normalizing, CrCl >30 ml/min.    BMI<30 kg/m2, D-dimer <6.5mg/L  Increase enoxaparin 30mg SC back to q12h per COVID19 AC protocol  [facility] daily     Thanks,  Jackqueline Bosworth, [place]

## 2021-01-06 LAB
ALBUMIN SERPL-MCNC: 3.4 G/DL (ref 3.5–5)
ALBUMIN/GLOB SERPL: 0.8 {RATIO} (ref 1.1–2.2)
ALP SERPL-CCNC: 56 U/L (ref 45–117)
ALT SERPL-CCNC: 107 U/L (ref 12–78)
ANION GAP SERPL CALC-SCNC: 5 MMOL/L (ref 5–15)
APTT PPP: 28.5 SEC (ref 22.1–31)
AST SERPL-CCNC: 41 U/L (ref 15–37)
BILIRUB SERPL-MCNC: 0.4 MG/DL (ref 0.2–1)
BUN SERPL-MCNC: 37 MG/DL (ref 6–20)
BUN/CREAT SERPL: 32 (ref 12–20)
CALCIUM SERPL-MCNC: 9.7 MG/DL (ref 8.5–10.1)
CHLORIDE SERPL-SCNC: 105 MMOL/L (ref 97–108)
CO2 SERPL-SCNC: 27 MMOL/L (ref 21–32)
CREAT SERPL-MCNC: 1.15 MG/DL (ref 0.55–1.02)
D DIMER PPP FEU-MCNC: 0.81 MG/L FEU (ref 0–0.65)
ERYTHROCYTE [DISTWIDTH] IN BLOOD BY AUTOMATED COUNT: 14.8 % (ref 11.5–14.5)
GLOBULIN SER CALC-MCNC: 4.4 G/DL (ref 2–4)
GLUCOSE SERPL-MCNC: 103 MG/DL (ref 65–100)
HCT VFR BLD AUTO: 41.5 % (ref 35–47)
HGB BLD-MCNC: 13.8 G/DL (ref 11.5–16)
INR PPP: 1 (ref 0.9–1.1)
MCH RBC QN AUTO: 25.4 PG (ref 26–34)
MCHC RBC AUTO-ENTMCNC: 33.3 G/DL (ref 30–36.5)
MCV RBC AUTO: 76.3 FL (ref 80–99)
NRBC # BLD: 0 K/UL (ref 0–0.01)
NRBC BLD-RTO: 0 PER 100 WBC
PLATELET # BLD AUTO: 472 K/UL (ref 150–400)
PMV BLD AUTO: 10.5 FL (ref 8.9–12.9)
POTASSIUM SERPL-SCNC: 3.8 MMOL/L (ref 3.5–5.1)
PROT SERPL-MCNC: 7.8 G/DL (ref 6.4–8.2)
PROTHROMBIN TIME: 10.5 SEC (ref 9–11.1)
RBC # BLD AUTO: 5.44 M/UL (ref 3.8–5.2)
SODIUM SERPL-SCNC: 137 MMOL/L (ref 136–145)
THERAPEUTIC RANGE,PTTT: NORMAL SECS (ref 58–77)
WBC # BLD AUTO: 17.3 K/UL (ref 3.6–11)

## 2021-01-06 PROCEDURE — 97116 GAIT TRAINING THERAPY: CPT

## 2021-01-06 PROCEDURE — 65660000000 HC RM CCU STEPDOWN

## 2021-01-06 PROCEDURE — 85379 FIBRIN DEGRADATION QUANT: CPT

## 2021-01-06 PROCEDURE — 85027 COMPLETE CBC AUTOMATED: CPT

## 2021-01-06 PROCEDURE — 97530 THERAPEUTIC ACTIVITIES: CPT

## 2021-01-06 PROCEDURE — 85610 PROTHROMBIN TIME: CPT

## 2021-01-06 PROCEDURE — 85730 THROMBOPLASTIN TIME PARTIAL: CPT

## 2021-01-06 PROCEDURE — 74011250637 HC RX REV CODE- 250/637: Performed by: NURSE PRACTITIONER

## 2021-01-06 PROCEDURE — 74011250636 HC RX REV CODE- 250/636: Performed by: NURSE PRACTITIONER

## 2021-01-06 PROCEDURE — 36415 COLL VENOUS BLD VENIPUNCTURE: CPT

## 2021-01-06 PROCEDURE — 74011250636 HC RX REV CODE- 250/636: Performed by: INTERNAL MEDICINE

## 2021-01-06 PROCEDURE — 80053 COMPREHEN METABOLIC PANEL: CPT

## 2021-01-06 RX ADMIN — DEXAMETHASONE 6 MG: 4 TABLET ORAL at 11:19

## 2021-01-06 RX ADMIN — Medication 10 ML: at 22:11

## 2021-01-06 RX ADMIN — POLYETHYLENE GLYCOL 3350 17 G: 17 POWDER, FOR SOLUTION ORAL at 11:18

## 2021-01-06 RX ADMIN — Medication 10 ML: at 14:09

## 2021-01-06 RX ADMIN — AMLODIPINE BESYLATE 5 MG: 5 TABLET ORAL at 11:19

## 2021-01-06 RX ADMIN — ENOXAPARIN SODIUM 30 MG: 30 INJECTION SUBCUTANEOUS at 22:11

## 2021-01-06 RX ADMIN — CHOLECALCIFEROL TAB 25 MCG (1000 UNIT) 2 TABLET: 25 TAB at 11:19

## 2021-01-06 RX ADMIN — ENOXAPARIN SODIUM 30 MG: 30 INJECTION SUBCUTANEOUS at 11:18

## 2021-01-06 NOTE — PROGRESS NOTES
I reviewed pertinent labs and imaging, and discussed /agreed on the plan of care with Dr. Trev Walters      Hospitalist Progress Note    NAME: Gonzalo Maharaj   :  1934   MRN:  [id number]       Assessment / Plan:  Generalized weakness- stable    Symptomatic orthostatic hypotension- resolved   admitted after second visit to ER with c/o generalized weakness / dizziness   antihypertensives held till  then resumed   OOB to chair for meals   son requesting possible rehab placement since pt lives alone , possible SNF placement tomorrow , her son updated today with a phone HSgd,[phone]  Physical therapy and case management inputs appreciated. Patients son updated 21  Leukocytosis likely related to steroid use. We will continue to monitor  Atypical chest pain:probably related to GI noncardiac we will continue to monitor. Constipation patient is on a bowel regimen now we will continue to monitor. COVID positive , asymptomatic. CAD  HTN  BP Within acceptable range now ,we will continue to monitor. Code Status: Full  Surrogate Decision Maker son  Mckenzie Salinas [phone]  Body mass index is 23.4 kg/m². Code status: Full  Prophylaxis: Lovenox  Recommended Disposition: TBD     Subjective:     Chief Complaint / Reason for Physician Visit  Patient was seen and examined today as well. Patient really want to go to rehab and then home. She does not have any other complaints for me when I see her today. \".  Discussed with RN events overnight. Review of Systems:  Symptom Y/N Comments  Symptom Y/N Comments   Fever/Chills n   Chest Pain n    Poor Appetite n   Edema n    Cough y dry  Abdominal Pain n    Sputum n   Joint Pain n    SOB/AGUILERA n   Pruritis/Rash n    Nausea/vomit n   Tolerating PT/OT n    Diarrhea n   Tolerating Diet y    Constipation    Other       Could NOT obtain due to:      Objective:     VITALS:   Last 24hrs VS reviewed since prior progress note.  Most recent are:  Patient Vitals for the past 24 hrs:   Temp Pulse Resp BP SpO2   01/06/21 0330 97.8 °F (36.6 °C) 72 18 (!) 145/70 97 %   01/05/21 2330 97.7 °F (36.5 °C) 69 16 (!) 164/79 98 %   01/05/21 2037    (!) 155/84    01/05/21 1949 97.8 °F (36.6 °C) 77 18 (!) 167/95 94 %   01/05/21 1622 97.5 °F (36.4 °C) 90 17 (!) 144/78 96 %   01/05/21 1528    (!) 149/71 95 %   01/05/21 1520    138/84    01/05/21 1517    136/64 98 %   01/05/21 1513    137/69    01/05/21 1253 97.8 °F (36.6 °C) 84 16 136/74 100 %       Intake/Output Summary (Last 24 hours) at 1/6/2021 1750  Last data filed at 1/6/2021 0463  Gross per 24 hour   Intake 120 ml   Output 250 ml   Net -130 ml        I had a face to face encounter and independently examined this patient on 1/6/2021, as outlined below:  PHYSICAL EXAM:  General: Thin . Alert, cooperative, pleasant no acute distress    EENT:  EOMI. Anicteric sclerae. MMM poor dentition   Resp:   no wheezing or rales. No accessory muscle use, room air , dry cough   CV:  S1S2,  No edema ,   GI:  Soft,  Non tender. +Bowel sounds + flatus constipation   Neurologic:  Alert and oriented X 3, normal speech,   Psych:   . Not anxious nor agitated ? manipulative  Skin:  No rashes. No jaundice Dry ,      Reviewed most current lab test results and cultures  YES  Reviewed most current radiology test results   YES  Review and summation of old records today    NO  Reviewed patient's current orders and MAR    YES  PMH/SH reviewed - no change compared to H&P  ________________________________________________________________________  Care Plan discussed with:    Comments   Patient x    Family  x    RN x    Care Manager x    Consultant                        Multidiciplinary team rounds were held today with , nursing, pharmacist and clinical coordinator. Patient's plan of care was discussed; medications were reviewed and discharge planning was addressed.   ________________________________________________________________________  Total NON critical care TIME: 30   Minutes    Total CRITICAL CARE TIME Spent:   Minutes non procedure based      Comments   >50% of visit spent in counseling and coordination of care     ________________________________________________________________________  Caroline Eli MD     Procedures: see electronic medical records for all procedures/Xrays and details which were not copied into this note but were reviewed prior to creation of Plan.      LABS:  I reviewed today's most current labs and imaging studies.  Pertinent labs include:  Recent Labs     01/05/21 0536 01/04/21 0615   WBC 18.3* 13.9*   HGB 13.3 13.0   HCT 40.3 39.9   * 442*     Recent Labs     01/05/21 0536 01/04/21 0615    139   K 4.5 4.5    107   CO2 24 26   * 98   BUN 33* 37*   CREA 1.07* 1.25*   CA 9.5 9.3   ALB 3.2* 3.2*   TBILI 0.7 0.7   ALT 82* 83*   INR 1.0 1.0       Signed: Caroline Eli MD

## 2021-01-06 NOTE — PROGRESS NOTES
Pt had an uneventful night, slept well. No episodes of hypotension, fever, BP remains in a stable range. Plan to d/c to SNF for further rehab with the goal of recovering baseline performance of ADL's. Pt due for labs this am, attempted x2 w/o success. Will notify AM staff to attempt lab collection. Cont current plan of care.

## 2021-01-06 NOTE — PROGRESS NOTES
ANISHA:  -SNF: [address] initiated 1/6/2021    -BLS/AMR  -2nd IM needed      11:30am: CM received a call from [address] [place] [address] that facility would be able to accept Pt tomorrow morning pending insurance auth. CM sent clinicals to [facility] to initiate insurance auth. 9am: CM received follow-up from [facility]. Facility currently does not have female COVID beds available. [facility] unable to accept at this time. [facility] referrals are pending at this time. An additional referral was sent to [facility] for consideration.       Roland Jones, [facility], MAYA Claudio

## 2021-01-06 NOTE — ROUTINE PROCESS
Verbal shift change report given to Dakota Lu RN (oncoming nurse) by Radha Abdi (offgoing nurse). Report included the following information SBAR, Intake/Output, MAR, Recent Results and Cardiac Rhythm SB/NSR.

## 2021-01-06 NOTE — PROGRESS NOTES
End of Shift Note    Bedside shift change report given to [address] [place] [address], [facility] (oncoming nurse) by Edenilson Zavala RN (offgoing nurse). Report included the following information SBAR, Kardex, MAR, Recent Results and Cardiac Rhythm NSR/SB    Shift worked:  10-3     Shift summary and any significant changes: Tolerated sitting in chair for a couple hours. Concerns for physician to address:       Zone phone for oncoming shift:          Activity:  Activity Level: Up with Assistance  Number times ambulated in hallways past shift: 0  Number of times OOB to chair past shift: 1    Cardiac:   Cardiac Monitoring: Yes      Cardiac Rhythm: Normal sinus rhythm, Sinus bradycardia    Access:   Current line(s): PIV     Genitourinary:   Urinary status: voiding    Respiratory:   O2 Device: Room air  Chronic home O2 use?: NO  Incentive spirometer at bedside: NO     GI:  Last Bowel Movement Date: 01/04/21  Current diet:  DIET CARDIAC Regular  DIET ONE TIME MESSAGE  Passing flatus: YES  Tolerating current diet: YES  % Diet Eaten: 50 %    Pain Management:   Patient states pain is manageable on current regimen: YES    Skin:  Cordell Score: 19  Interventions: float heels, increase time out of bed, limit briefs and internal/external urinary devices    Patient Safety:  Fall Score:  Total Score: 3  Interventions: assistive device (walker, cane, etc), gripper socks, pt to call before getting OOB and stay with me (per policy)  High Fall Risk: Yes    Length of Stay:  Expected LOS: 2d 7h  Actual LOS: Hipolito Ross RN

## 2021-01-06 NOTE — PROGRESS NOTES
Problem: Falls - Risk of  Goal: *Absence of Falls  Description: Document Nicole Gonzalez Fall Risk and appropriate interventions in the flowsheet. Outcome: Progressing Towards Goal  Note: Fall Risk Interventions:  Mobility Interventions: Bed/chair exit alarm         Medication Interventions: Assess postural VS orthostatic hypotension, Bed/chair exit alarm, Patient to call before getting OOB    Elimination Interventions: Call light in reach, Toileting schedule/hourly rounds              Problem: Patient Education: Go to Patient Education Activity  Goal: Patient/Family Education  Outcome: Progressing Towards Goal     Problem: Pressure Injury - Risk of  Goal: *Prevention of pressure injury  Description: Document Cordell Scale and appropriate interventions in the flowsheet. Outcome: Progressing Towards Goal  Note: Pressure Injury Interventions:  Sensory Interventions: Assess changes in LOC, Check visual cues for pain, Discuss PT/OT consult with provider, Turn and reposition approx.  every two hours (pillows and wedges if needed)    Moisture Interventions: Absorbent underpads    Activity Interventions: Increase time out of bed, PT/OT evaluation    Mobility Interventions: PT/OT evaluation    Nutrition Interventions: Document food/fluid/supplement intake    Friction and Shear Interventions: HOB 30 degrees or less, Lift sheet, Minimize layers                Problem: Patient Education: Go to Patient Education Activity  Goal: Patient/Family Education  Outcome: Progressing Towards Goal

## 2021-01-06 NOTE — PROGRESS NOTES
Problem: Mobility Impaired (Adult and Pediatric)  Goal: *Acute Goals and Plan of Care (Insert Text)  Description: FUNCTIONAL STATUS PRIOR TO ADMISSION: Pt reports ambulation with use of RW. Denies history of falls. Reports increased weakness/difficulty caring for herself over previous 1 month. Pt reports x1 month of increased dizziness with mobility, stating she was mostly spending the day in bed as a result. HOME SUPPORT PRIOR TO ADMISSION: The patient lives alone. Son lives in the area and assists as needed. Pt reports she previously had caregiver 5 days/wk for 5 hrs however caregiver has stopped coming to her home. Physical Therapy Goals  Revised 1/6/2021  1. Patient will move from supine to sit and sit to supine , scoot up and down, and roll side to side in bed with modified independence within 7 day(s). 2.  Patient will transfer from bed to chair and chair to bed with supervision/set-up using the least restrictive device within 7 day(s). 3.  Patient will perform sit to stand with modified independence within 7 day(s). 4.  Patient will ambulate with standby assistance for 90 feet with the least restrictive device within 7 day(s). Physical Therapy Goals  Initiated 12/30/2020  1. Patient will move from supine to sit and sit to supine , scoot up and down, and roll side to side in bed with supervision/set-up within 7 day(s). 2.  Patient will transfer from bed to chair and chair to bed with supervision/set-up using the least restrictive device within 7 day(s). 3.  Patient will perform sit to stand with supervision/set-up within 7 day(s). 4.  Patient will ambulate with contact guard assist for 25 feet with the least restrictive device within 7 day(s). Met 1/6/21.    1/6/2021 1552 by Rose Agudelo PT  Outcome: Progressing Towards Goal    PHYSICAL THERAPY TREATMENT: WEEKLY REASSESSMENT  Patient:  Asad Nunez (80 y.o. female)  Date: 1/6/2021  Primary Diagnosis: Generalized weakness [R53.1]  Orthostatic hypotension [I95.1]        Precautions:   Fall, Contact(Droplet Plus)      ASSESSMENT  Patient continues with skilled PT services and is slowly progressing towards goals. Gait skill and tolerance have made the most progress, but still limited to 15 feet before needing a sitting rest after which she can do the return trip. O2 sats were stable on 2L/min O2 at 94% post gait training. Her standing balance is slowly improving, she still needs the RW for support and decrease fall risk. Patient's progression toward goals since last assessment: only met the gait goal this week. Still limited by overall weakness when transitioning from supine to sidelying to supine and sit to standing. Physical Therapy Goals  Initiated 12/30/2020  1. Patient will move from supine to sit and sit to supine , scoot up and down, and roll side to side in bed with supervision/set-up within 7 day(s). 2.  Patient will transfer from bed to chair and chair to bed with supervision/set-up using the least restrictive device within 7 day(s). 3.  Patient will perform sit to stand with supervision/set-up within 7 day(s). 4.  Patient will ambulate with contact guard assist for 25 feet with the least restrictive device within 7 day(s). Met 1/6/21. Current Level of Function Impacting Discharge (mobility/balance): min a supine to sit and sit to standing; cg bed to chair transfers with RW; cg ambulation with RW 15 feet x 2. Functional Outcome Measure: The patient scored 50/100 on the Barthel outcome measure which is indicative of 50% functional impairment. Other factors to consider for discharge: modified independent with RW PLOF; lives alone; unsafe to be home alone. PLAN :  Goals have been updated based on progression since last assessment. Patient continues to benefit from skilled intervention to address the above impairments.     Recommendations and Planned Interventions: bed mobility training, transfer training, gait training, therapeutic exercises, neuromuscular re-education, patient and family training/education, and therapeutic activities      Frequency/Duration: Patient will be followed by physical therapy:  3 times a week to address goals. Recommendation for discharge: (in order for the patient to meet his/her long term goals)  Therapy up to 5 days/week in SNF setting    This discharge recommendation:  Has been made in collaboration with the attending provider and/or case management    IF patient discharges home will need the following DME: patient owns DME required for discharge, but needs 24/7 supervision/assistance       SUBJECTIVE:   Patient stated Veleta Ragland much longer will I be in the hospital?    OBJECTIVE DATA SUMMARY:   HISTORY:    Past Medical History:   Diagnosis Date    CAD (coronary artery disease)     Hypertension    No past surgical history on file. Personal factors and/or comorbidities impacting plan of care: covid, cad    Home Situation  Home Environment: Private residence  # Steps to Enter: 0  One/Two Story Residence: One story  Living Alone: Yes  Support Systems: Child(jorden)  Patient Expects to be Discharged to[de-identified] Rehabilitation facility  Current DME Used/Available at Home: Wheelchair, Walker, rolling, Shower chair, Cane, straight  Tub or Shower Type: Tub/Shower combination    EXAMINATION/PRESENTATION/DECISION MAKING:   Critical Behavior:  Neurologic State: Alert  Orientation Level: Oriented X4  Cognition: Appropriate decision making, Appropriate for age attention/concentration, Appropriate safety awareness, Follows commands  Safety/Judgement: Fall prevention, Decreased insight into deficits, Awareness of environment, Good awareness of safety precautions(weakness)  Hearing:   Auditory  Auditory Impairment: Hard of hearing, bilateral  Skin:  breakdown over coccyx  Edema: none  Range Of Motion:  AROM: Generally decreased, functional           PROM: Within functional limits Strength:    Strength: Generally decreased, functional(3-/5 hips; 4-/5 knees; 3-/5 ankles)                    Tone & Sensation:   Tone: Normal                              Coordination:  Coordination: Generally decreased, functional(slower movement due to weakness)    Functional Mobility:  Bed Mobility:  Rolling: Stand-by assistance  Supine to Sit: Minimum assistance     Scooting: Stand-by assistance  Transfers:  Sit to Stand: Minimum assistance  Stand to Sit: Contact guard assistance        Bed to Chair: Contact guard assistance; Adaptive equipment(RW)              Balance:      Ambulation/Gait Training:  Distance (ft): 30 Feet (ft)(15 x 2)  Assistive Device: Walker, rolling  Ambulation - Level of Assistance: Contact guard assistance        Gait Abnormalities: Decreased step clearance; Path deviations; Step to gait;Trunk sway increased        Base of Support: Widened     Speed/Roseline: Pace decreased (<100 feet/min)  Step Length: Right shortened;Left shortened        Interventions: Safety awareness training           Functional Measure:  Barthel Index:    Bathin  Bladder: 5  Bowels: 10  Groomin  Dressin  Feeding: 10  Mobility: 0  Stairs: 0  Toilet Use: 5  Transfer (Bed to Chair and Back): 10  Total: 50/100       The Barthel ADL Index: Guidelines  1. The index should be used as a record of what a patient does, not as a record of what a patient could do. 2. The main aim is to establish degree of independence from any help, physical or verbal, however minor and for whatever reason. 3. The need for supervision renders the patient not independent. 4. A patient's performance should be established using the best available evidence. Asking the patient, friends/relatives and nurses are the usual sources, but direct observation and common sense are also important. However direct testing is not needed.   5. Usually the patient's performance over the preceding 24-48 hours is important, but occasionally longer periods will be relevant. 6. Middle categories imply that the patient supplies over 50 per cent of the effort. 7. Use of aids to be independent is allowed. Luis Kenny., Barthel, DThaddeusW. ([id number]). Functional evaluation: the Barthel Index. [address] (14)2. Deja Riding jeremy JMAAICA Cool, Petty Martinez., Kavitha Jerez., [place], [address] (1999). Measuring the change indisability after inpatient rehabilitation; comparison of the responsiveness of the Barthel Index and Functional [place] Measure. Journal of Neurology, Neurosurgery, and Psychiatry, 66(4), 461-714. REJI Montalvo, KONRAD Rajan, & Isaura Kirkland MThaddeusA. (2004.) Assessment of post-stroke quality of life in cost-effectiveness studies: The usefulness of the Barthel Index and the EuroQoL-5D. Quality of Life Research, 13, 427-43       Pain Rating:  Coccyx when sitting or transferring from supine to sitting. Activity Tolerance:   Fair, desaturates with exertion and requires oxygen, requires frequent rest breaks, and observed SOB with activity    After treatment patient left in no apparent distress:   Sitting on edge of bed and left with her RN. COMMUNICATION/EDUCATION:   The patients plan of care was discussed with: Occupational therapist, Registered nurse, and Case management. Fall prevention education was provided and the patient/caregiver indicated understanding., Patient/family have participated as able in goal setting and plan of care. , and Patient/family agree to work toward stated goals and plan of care.     Thank you for this referral.  Pricila Epps, PT   Time Calculation: 31 mins

## 2021-01-06 NOTE — PROGRESS NOTES
CM notified [facility] has been approved. CM was provided the following information:     Andree An reference#: [id number]  Start of care: 1/6/2021  Approved for 8 days   Next review date: 1/13/2021  Care coordinator: Sony Martin  Fax updates to: [phone]      Transition of Care Plan to SNF/Rehab    SNF/Rehab Transition:  Patient has been accepted to [facility]  and meets criteria for admission. Patient will transported by [facility] and expected to leave on 1/7/2021 at 10am     Communication to Patient/Family:  Met with patient and son Karthik Gutiérrez and they are agreeable to the transition plan. Communication to SNF/Rehab:  Bedside RN,  has been notified to update the transition plan to the facility and   call report: [phone]  Room 107 A    Discharge information has been updated on the AVS.     Discharge instructions to be fax'd to facility at        Nursing Please include all hard scripts for controlled substances, med rec and dc summary, and AVS in packet.      Reviewed and confirmed with facility, [facility], can manage the patient care needs for the following:     SNF/Rehab Transition:  Patient to follow-up with Home Health: TBD by facility  PCP/Specialist: Marquis Hwang     Reviewed and confirmed with facility, [facility] they can manage the patient care needs for the following:     Rabia Samples with (X) only those applicable:    Medication:  [x]  Medications will be available at the facility  []  IV Antibiotics    []  Controlled Substance - hard copy to be sent with patient   []  Weekly Labs   Documents:  [] Hard RX  [x] MAR  [x] Kardex  [x] AVS  []Transfer Summary  [x]Discharge   Equipment:  []  CPAP/BiPAP  []  Wound Vacuum  []  Ny or Urinary Device  []  PICC/Central Line  []  Nebulizer  []  Ventilator   Treatment:  [x]Isolation COVID+  []Surgical Drain Management  []Tracheostomy Care  []Dressing Changes  []Dialysis with transportation and chair time  []PEG Care  []Oxygen  []Daily Weights for Heart Failure   Dietary:  []Any diet limitations  []Tube Feedings   []Total Parenteral Management (TPN)   Eligible for Medicaid Long Term Services and Supports  Yes:  [] Eligible for medical assistance or will become eligible within 180 days and UAI completed. [] Provider/Patient and/or support system has requested screening. [] UAI copy provided to patient or responsible party. [] UAI unavailable at discharge will send once processed to SNF provider. [] UAI unavailable at discharged mailed to patient  No:   [] Private pay and is not financially eligible for Medicaid within the next 180 days. [] Reside out-of-state.   [] A residents of a state owned/operated facility that is licensed  by [address] and [facility] or [facility]  [] Enrollment in [facility] hospice services  []  Medical [address]  [] Patient /Family declines to have screening completed or provide financial information for screening     Financial Resources:  Medicaid    [] Initiated and application pending   [x] Full coverage     Advanced Care Plan:  [x]Surrogate Decision Maker of Care  []POA  [x]Communicated Code Status  FULL    Other

## 2021-01-06 NOTE — PROGRESS NOTES
End of Shift Note    Bedside shift change report given to Jose Daniel Payne (oncoming nurse) by Zoya Montenegro (offgoing nurse). Report included the following information SBAR, Kardex, Intake/Output, MAR and Accordion    Shift worked:  4820-6745     Shift summary and any significant changes:          Concerns for physician to address:       Zone phone for oncoming shift:   [phone]       Activity:  Activity Level: Up with Assistance  Number times ambulated in hallways past shift: 0  Number of times OOB to chair past shift: 1    Cardiac:   Cardiac Monitoring: Yes      Cardiac Rhythm: Normal sinus rhythm    Access:   Current line(s): PIV     Genitourinary:   Urinary status: voiding and external catheter    Respiratory:   O2 Device: Room air  Chronic home O2 use?: NO  Incentive spirometer at bedside: YES     GI:  Last Bowel Movement Date: 12/30/20  Current diet:  DIET CARDIAC Regular  DIET ONE TIME MESSAGE  Passing flatus: YES  Tolerating current diet: YES  % Diet Eaten: 0 %    Pain Management:   Patient states pain is manageable on current regimen: YES    Skin:  Cordell Score: 19  Interventions: increase time out of bed, PT/OT consult and internal/external urinary devices    Patient Safety:  Fall Score:  Total Score: 3  Interventions: assistive device (walker, cane, etc), gripper socks and pt to call before getting OOB  High Fall Risk: Yes    Length of Stay:  Expected LOS: 2d 7h  Actual LOS: [facility]

## 2021-01-07 VITALS
OXYGEN SATURATION: 98 % | TEMPERATURE: 97.6 F | DIASTOLIC BLOOD PRESSURE: 71 MMHG | RESPIRATION RATE: 17 BRPM | BODY MASS INDEX: 23.3 KG/M2 | HEIGHT: 66 IN | HEART RATE: 63 BPM | WEIGHT: 145 LBS | SYSTOLIC BLOOD PRESSURE: 142 MMHG

## 2021-01-07 LAB
APTT PPP: 32.8 SEC (ref 22.1–31)
COMMENT, HOLDF: NORMAL
D DIMER PPP FEU-MCNC: 0.48 MG/L FEU (ref 0–0.65)
FIBRINOGEN PPP-MCNC: 357 MG/DL (ref 200–475)
INR PPP: 1.1 (ref 0.9–1.1)
PROTHROMBIN TIME: 11 SEC (ref 9–11.1)
SAMPLES BEING HELD,HOLD: NORMAL
THERAPEUTIC RANGE,PTTT: ABNORMAL SECS (ref 58–77)

## 2021-01-07 PROCEDURE — 74011250636 HC RX REV CODE- 250/636: Performed by: NURSE PRACTITIONER

## 2021-01-07 PROCEDURE — 36415 COLL VENOUS BLD VENIPUNCTURE: CPT

## 2021-01-07 PROCEDURE — 85730 THROMBOPLASTIN TIME PARTIAL: CPT

## 2021-01-07 PROCEDURE — 74011250637 HC RX REV CODE- 250/637: Performed by: NURSE PRACTITIONER

## 2021-01-07 PROCEDURE — 85379 FIBRIN DEGRADATION QUANT: CPT

## 2021-01-07 PROCEDURE — 74011250636 HC RX REV CODE- 250/636: Performed by: INTERNAL MEDICINE

## 2021-01-07 PROCEDURE — 90471 IMMUNIZATION ADMIN: CPT

## 2021-01-07 PROCEDURE — 85610 PROTHROMBIN TIME: CPT

## 2021-01-07 PROCEDURE — 85384 FIBRINOGEN ACTIVITY: CPT

## 2021-01-07 PROCEDURE — 90686 IIV4 VACC NO PRSV 0.5 ML IM: CPT | Performed by: GENERAL ACUTE CARE HOSPITAL

## 2021-01-07 PROCEDURE — 74011250636 HC RX REV CODE- 250/636: Performed by: GENERAL ACUTE CARE HOSPITAL

## 2021-01-07 RX ORDER — MELATONIN
2000 DAILY
Qty: 30 TAB | Refills: 0 | Status: SHIPPED | OUTPATIENT
Start: 2021-01-07

## 2021-01-07 RX ORDER — DEXAMETHASONE 4 MG/1
TABLET ORAL
Qty: 2 TAB | Refills: 0 | Status: SHIPPED | OUTPATIENT
Start: 2021-01-07 | End: 2021-01-26 | Stop reason: RX

## 2021-01-07 RX ORDER — AMLODIPINE BESYLATE 5 MG/1
5 TABLET ORAL DAILY
Qty: 30 TAB | Refills: 0 | Status: SHIPPED | OUTPATIENT
Start: 2021-01-07

## 2021-01-07 RX ADMIN — ACETAMINOPHEN 650 MG: 325 TABLET ORAL at 04:01

## 2021-01-07 RX ADMIN — AMLODIPINE BESYLATE 5 MG: 5 TABLET ORAL at 09:27

## 2021-01-07 RX ADMIN — ENOXAPARIN SODIUM 30 MG: 30 INJECTION SUBCUTANEOUS at 09:28

## 2021-01-07 RX ADMIN — DEXAMETHASONE 6 MG: 4 TABLET ORAL at 09:27

## 2021-01-07 RX ADMIN — CHOLECALCIFEROL TAB 25 MCG (1000 UNIT) 2 TABLET: 25 TAB at 09:27

## 2021-01-07 RX ADMIN — Medication 10 ML: at 05:09

## 2021-01-07 RX ADMIN — INFLUENZA VIRUS VACCINE 0.5 ML: 15; 15; 15; 15 SUSPENSION INTRAMUSCULAR at 09:27

## 2021-01-07 RX ADMIN — IBUPROFEN 400 MG: 400 TABLET, FILM COATED ORAL at 09:27

## 2021-01-07 NOTE — PROGRESS NOTES
0700: Bedside shift change report given to Cirilo RN (oncoming nurse) by Misael Wooten RN (offgoing nurse). Report included the following information SBAR and Kardex.

## 2021-01-07 NOTE — PROGRESS NOTES
Bedside and Verbal shift change report given to Alicia Hartley (oncoming nurse) by [employer]. Report included the following information SBAR, Kardex, Intake/Output, MAR, Accordion, Recent Results, Med Rec Status and Quality Measures.

## 2021-01-07 NOTE — PROGRESS NOTES
Bedside shift change report given to Lex (oncoming nurse) by Stephany Mckenzie (offgoing nurse). Report included the following information SBAR, Kardex, Intake/Output, MAR, Accordion, Recent Results and Quality Measures.

## 2021-01-07 NOTE — PROGRESS NOTES
End of Shift Note    Bedside shift change report given to Raylene Holter., RN (oncoming nurse) by Amanda Wynne (offgoing nurse). Report included the following information SBAR, Intake/Output, MAR and Recent Results    Shift worked:  1900-0730     Shift summary and any significant changes:     VSS. Patient resting quietly. Concerns for physician to address:  N/A     Zone phone for oncoming shift:   [phone]       Activity:  Activity Level: Up with Assistance  Number times ambulated in hallways past shift: 0  Number of times OOB to chair past shift: 0    Cardiac:   Cardiac Monitoring: Yes      Cardiac Rhythm: Normal sinus rhythm    Access:   Current line(s): PIV     Genitourinary:   Urinary status: voiding and external catheter    Respiratory:   O2 Device: Room air  Chronic home O2 use?: NO  Incentive spirometer at bedside: N/A     GI:  Last Bowel Movement Date: 01/04/21  Current diet:  DIET CARDIAC Regular  DIET ONE TIME MESSAGE  Passing flatus: YES  Tolerating current diet: YES  % Diet Eaten: 0 %    Pain Management:   Patient states pain is manageable on current regimen: YES    Skin:  Cordell Score: 19  Interventions: increase time out of bed    Patient Safety:  Fall Score:  Total Score: 3  Interventions: assistive device (walker, cane, etc), gripper socks and pt to call before getting OOB  High Fall Risk: Yes    Length of Stay:  Expected LOS: 2d 7h  Actual LOS: 7      Amanda Wynne RN

## 2021-01-07 NOTE — PROGRESS NOTES
Problem: Falls - Risk of  Goal: *Absence of Falls  Description: Document Green Salvia Fall Risk and appropriate interventions in the flowsheet.   Outcome: Progressing Towards Goal  Note: Fall Risk Interventions:  Mobility Interventions: Bed/chair exit alarm, OT consult for ADLs, Patient to call before getting OOB, PT Consult for mobility concerns, Utilize walker, cane, or other assistive device         Medication Interventions: Assess postural VS orthostatic hypotension, Bed/chair exit alarm, Patient to call before getting OOB, Teach patient to arise slowly    Elimination Interventions: Bed/chair exit alarm, Call light in reach, Toilet paper/wipes in reach    History of Falls Interventions: Bed/chair exit alarm, Room close to nurse's station         Problem: Patient Education: Go to Patient Education Activity  Goal: Patient/Family Education  Outcome: Progressing Towards Goal

## 2021-01-07 NOTE — DISCHARGE SUMMARY
Hospitalist Discharge Summary     Patient ID:  Armando Bermudez  [id number]  20 y.o.  1934    PCP on record: Shiraz Galvan MD    Admit date: 12/29/2020  Discharge date and time: 1/7/2021      DISCHARGE DIAGNOSIS:      Generalized weakness- stable    Symptomatic orthostatic hypotension- resolved       CONSULTATIONS:  IP CONSULT TO HOSPITALIST    Excerpted HPI from H&P of Angelica Mitchell MD:  Enedelia Clay is a 80 y.o.  female who presents with CC listed above. I had seen the patient 2-3 days ago in the ER when she presented with similar complaints. At that time, pt received IVF in the ER, we discussed adjusting her BP meds, and she was able to ambulate well with a walker. Unfortunately it seems that pt's condition has continued to worsen and she is unable to manage herself at home.       ______________________________________________________________________  DISCHARGE SUMMARY/HOSPITAL COURSE:  for full details see H&P, daily progress notes, labs, consult notes. Generalized weakness- stable    Symptomatic orthostatic hypotension- resolved   admitted after second visit to ER with c/o generalized weakness / dizziness   antihypertensives held till 1/1 then resumed   OOB to chair for meals   SNF placement today  , her son updated with a phone [facility]  Leukocytosis likely related to steroid use. We will continue to monitor  Atypical chest pain:probably related to GI noncardiac we will continue to monitor. Constipation patient is on a bowel regimen now we will continue to monitor. COVID positive , asymptomatic. CAD  HTN  BP Within acceptable range now ,we will continue to monitor. Code Status: Full          _______________________________________________________________________  Patient seen and examined by me on discharge day. Pertinent Findings:  General:          Thin . Alert, cooperative, pleasant no acute distress    EENT:              EOMI. Anicteric sclerae. MMM poor dentition   Resp:                no wheezing or rales. No accessory muscle use, room air , dry cough   CV:                  S1S2,  No edema ,   GI:                   Soft,  Non tender.  +Bowel sounds + flatus constipation   Neurologic:       Alert and oriented X 3, normal speech,   Psych:   . Not anxious nor agitated ? manipulative  Skin:                No rashes. No jaundice Dry ,       _______________________________________________________________________  DISCHARGE MEDICATIONS:   Current Discharge Medication List      START taking these medications    Details   amLODIPine (NORVASC) 5 mg tablet Take 1 Tab by mouth daily. Qty: 30 Tab, Refills: 0      cholecalciferol (VITAMIN D3) (1000 Units /25 mcg) tablet Take 2 Tabs by mouth daily. Qty: 30 Tab, Refills: 0      dexAMETHasone (DECADRON) 4 mg tablet I tab daily for 2 days  Qty: 2 Tab, Refills: 0         STOP taking these medications       losartan (COZAAR) 25 mg tablet Comments:   Reason for Stopping:               My Recommended Diet, Activity, Wound Care, and follow-up labs are listed in the patient's Discharge Insturctions which I have personally completed and reviewed.     ______________________________________________________________________    Risk of deterioration: Moderate    Condition at Discharge:  Stable  ______________________________________________________________________    Disposition  Home with family, no needs    ______________________________________________________________________    Care Plan discussed with:   Patient, Family, RN, Care Manager, Consultant    Comment:   ______________________________________________________________________    Code Status: Full Code  ___

## 2021-01-07 NOTE — PROGRESS NOTES
Problem: Falls - Risk of  Goal: *Absence of Falls  Description: Document Raúl Chappell Fall Risk and appropriate interventions in the flowsheet. Outcome: Progressing Towards Goal  Note: Fall Risk Interventions:  Mobility Interventions: Bed/chair exit alarm         Medication Interventions: Patient to call before getting OOB    Elimination Interventions: Call light in reach              Problem: Pressure Injury - Risk of  Goal: *Prevention of pressure injury  Description: Document Cordell Scale and appropriate interventions in the flowsheet.   Outcome: Progressing Towards Goal  Note: Pressure Injury Interventions:  Sensory Interventions: Float heels, Keep linens dry and wrinkle-free    Moisture Interventions: Absorbent underpads    Activity Interventions: Increase time out of bed    Mobility Interventions: Float heels, HOB 30 degrees or less    Nutrition Interventions: Document food/fluid/supplement intake    Friction and Shear Interventions: HOB 30 degrees or less, Lift sheet, Minimize layers

## 2021-01-25 ENCOUNTER — APPOINTMENT (OUTPATIENT)
Dept: GENERAL RADIOLOGY | Age: 86
DRG: 392 | End: 2021-01-25
Attending: EMERGENCY MEDICINE
Payer: MEDICARE

## 2021-01-25 ENCOUNTER — HOSPITAL ENCOUNTER (INPATIENT)
Age: 86
LOS: 1 days | Discharge: NURSING, REHAB OR LONG-TERM CARE | DRG: 392 | End: 2021-02-02
Attending: EMERGENCY MEDICINE | Admitting: STUDENT IN AN ORGANIZED HEALTH CARE EDUCATION/TRAINING PROGRAM
Payer: MEDICARE

## 2021-01-25 DIAGNOSIS — R53.1 WEAKNESS: Primary | ICD-10-CM
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
## 2021-01-25 LAB
ANION GAP SERPL CALC-SCNC: 6 MMOL/L (ref 5–15)
APPEARANCE UR: CLEAR
BACTERIA URNS QL MICRO: NEGATIVE /HPF
BASOPHILS # BLD: 0 K/UL (ref 0–0.1)
BASOPHILS NFR BLD: 0 % (ref 0–1)
BILIRUB UR QL: NEGATIVE
BUN SERPL-MCNC: 21 MG/DL (ref 6–20)
BUN/CREAT SERPL: 16 (ref 12–20)
CALCIUM SERPL-MCNC: 9.3 MG/DL (ref 8.5–10.1)
CHLORIDE SERPL-SCNC: 110 MMOL/L (ref 97–108)
CO2 SERPL-SCNC: 26 MMOL/L (ref 21–32)
COLOR UR: NORMAL
CREAT SERPL-MCNC: 1.29 MG/DL (ref 0.55–1.02)
DIFFERENTIAL METHOD BLD: ABNORMAL
EOSINOPHIL # BLD: 0 K/UL (ref 0–0.4)
EOSINOPHIL NFR BLD: 0 % (ref 0–7)
EPITH CASTS URNS QL MICRO: NORMAL /LPF
ERYTHROCYTE [DISTWIDTH] IN BLOOD BY AUTOMATED COUNT: 16 % (ref 11.5–14.5)
GLUCOSE SERPL-MCNC: 105 MG/DL (ref 65–100)
GLUCOSE UR STRIP.AUTO-MCNC: NEGATIVE MG/DL
HCT VFR BLD AUTO: 32.8 % (ref 35–47)
HGB BLD-MCNC: 10.7 G/DL (ref 11.5–16)
HGB UR QL STRIP: NEGATIVE
HYALINE CASTS URNS QL MICRO: NORMAL /LPF (ref 0–5)
IMM GRANULOCYTES # BLD AUTO: 0 K/UL (ref 0–0.04)
IMM GRANULOCYTES NFR BLD AUTO: 0 % (ref 0–0.5)
KETONES UR QL STRIP.AUTO: NEGATIVE MG/DL
LEUKOCYTE ESTERASE UR QL STRIP.AUTO: NEGATIVE
LYMPHOCYTES # BLD: 0.6 K/UL (ref 0.8–3.5)
LYMPHOCYTES NFR BLD: 9 % (ref 12–49)
MCH RBC QN AUTO: 25.5 PG (ref 26–34)
MCHC RBC AUTO-ENTMCNC: 32.6 G/DL (ref 30–36.5)
MCV RBC AUTO: 78.3 FL (ref 80–99)
MONOCYTES # BLD: 0.2 K/UL (ref 0–1)
MONOCYTES NFR BLD: 3 % (ref 5–13)
NEUTS BAND NFR BLD MANUAL: 3 %
NEUTS SEG # BLD: 5.6 K/UL (ref 1.8–8)
NEUTS SEG NFR BLD: 85 % (ref 32–75)
NITRITE UR QL STRIP.AUTO: NEGATIVE
NRBC # BLD: 0 K/UL (ref 0–0.01)
NRBC BLD-RTO: 0 PER 100 WBC
PH UR STRIP: 6 [PH] (ref 5–8)
PLATELET # BLD AUTO: 247 K/UL (ref 150–400)
PMV BLD AUTO: 9.9 FL (ref 8.9–12.9)
POTASSIUM SERPL-SCNC: 4.3 MMOL/L (ref 3.5–5.1)
PROT UR STRIP-MCNC: NEGATIVE MG/DL
RBC # BLD AUTO: 4.19 M/UL (ref 3.8–5.2)
RBC #/AREA URNS HPF: NORMAL /HPF (ref 0–5)
RBC MORPH BLD: ABNORMAL
SODIUM SERPL-SCNC: 142 MMOL/L (ref 136–145)
SP GR UR REFRACTOMETRY: 1.01 (ref 1–1.03)
UA: UC IF INDICATED,UAUC: NORMAL
UROBILINOGEN UR QL STRIP.AUTO: 0.2 EU/DL (ref 0.2–1)
WBC # BLD AUTO: 6.4 K/UL (ref 3.6–11)
WBC URNS QL MICRO: NORMAL /HPF (ref 0–4)

## 2021-01-25 PROCEDURE — 85025 COMPLETE CBC W/AUTO DIFF WBC: CPT

## 2021-01-25 PROCEDURE — 73562 X-RAY EXAM OF KNEE 3: CPT

## 2021-01-25 PROCEDURE — 36415 COLL VENOUS BLD VENIPUNCTURE: CPT

## 2021-01-25 PROCEDURE — 93005 ELECTROCARDIOGRAM TRACING: CPT

## 2021-01-25 PROCEDURE — 71045 X-RAY EXAM CHEST 1 VIEW: CPT

## 2021-01-25 PROCEDURE — 74011250636 HC RX REV CODE- 250/636: Performed by: EMERGENCY MEDICINE

## 2021-01-25 PROCEDURE — 74011250637 HC RX REV CODE- 250/637: Performed by: EMERGENCY MEDICINE

## 2021-01-25 PROCEDURE — 99285 EMERGENCY DEPT VISIT HI MDM: CPT

## 2021-01-25 PROCEDURE — 80048 BASIC METABOLIC PNL TOTAL CA: CPT

## 2021-01-25 PROCEDURE — 81001 URINALYSIS AUTO W/SCOPE: CPT

## 2021-01-25 PROCEDURE — 96374 THER/PROPH/DIAG INJ IV PUSH: CPT

## 2021-01-25 RX ORDER — ACETAMINOPHEN 500 MG
1000 TABLET ORAL
Status: COMPLETED | OUTPATIENT
Start: 2021-01-25 | End: 2021-01-25

## 2021-01-25 RX ORDER — FENTANYL CITRATE 50 UG/ML
25 INJECTION, SOLUTION INTRAMUSCULAR; INTRAVENOUS ONCE
Status: COMPLETED | OUTPATIENT
Start: 2021-01-25 | End: 2021-01-25

## 2021-01-25 RX ADMIN — FENTANYL CITRATE 25 MCG: 50 INJECTION, SOLUTION INTRAMUSCULAR; INTRAVENOUS at 22:51

## 2021-01-25 RX ADMIN — ACETAMINOPHEN 1000 MG: 500 TABLET ORAL at 22:51

## 2021-01-26 PROBLEM — I10 ORTHOSTATIC HYPERTENSION: Status: ACTIVE | Noted: 2021-01-26
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
## 2021-01-26 PROBLEM — R53.1 GENERAL WEAKNESS: Status: ACTIVE | Noted: 2021-01-26

## 2021-01-26 PROBLEM — M25.559 HIP PAIN: Status: ACTIVE | Noted: 2021-01-26

## 2021-01-26 LAB
ATRIAL RATE: 93 BPM
CALCULATED P AXIS, ECG09: 37 DEGREES
CALCULATED R AXIS, ECG10: -25 DEGREES
CALCULATED T AXIS, ECG11: 72 DEGREES
DIAGNOSIS, 93000: NORMAL
IRON SATN MFR SERPL: 16 % (ref 20–50)
IRON SERPL-MCNC: 38 UG/DL (ref 35–150)
P-R INTERVAL, ECG05: 144 MS
Q-T INTERVAL, ECG07: 354 MS
QRS DURATION, ECG06: 84 MS
QTC CALCULATION (BEZET), ECG08: 440 MS
TIBC SERPL-MCNC: 232 UG/DL (ref 250–450)
VENTRICULAR RATE, ECG03: 93 BPM

## 2021-01-26 PROCEDURE — 36415 COLL VENOUS BLD VENIPUNCTURE: CPT

## 2021-01-26 PROCEDURE — 65270000029 HC RM PRIVATE

## 2021-01-26 PROCEDURE — 83550 IRON BINDING TEST: CPT

## 2021-01-26 PROCEDURE — 65390000012 HC CONDITION CODE 44 OBSERVATION

## 2021-01-26 PROCEDURE — 96372 THER/PROPH/DIAG INJ SC/IM: CPT

## 2021-01-26 PROCEDURE — 74011250636 HC RX REV CODE- 250/636: Performed by: HOSPITALIST

## 2021-01-26 PROCEDURE — 74011250637 HC RX REV CODE- 250/637: Performed by: HOSPITALIST

## 2021-01-26 RX ORDER — SODIUM CHLORIDE 9 MG/ML
50 INJECTION, SOLUTION INTRAVENOUS ONCE
Status: COMPLETED | OUTPATIENT
Start: 2021-01-26 | End: 2021-01-26

## 2021-01-26 RX ORDER — MELATONIN
2000 DAILY
Status: DISCONTINUED | OUTPATIENT
Start: 2021-01-26 | End: 2021-02-02 | Stop reason: HOSPADM

## 2021-01-26 RX ORDER — ACETAMINOPHEN 325 MG/1
650 TABLET ORAL
Status: DISCONTINUED | OUTPATIENT
Start: 2021-01-26 | End: 2021-02-02 | Stop reason: HOSPADM

## 2021-01-26 RX ORDER — AMLODIPINE BESYLATE 5 MG/1
5 TABLET ORAL DAILY
Status: DISCONTINUED | OUTPATIENT
Start: 2021-01-26 | End: 2021-02-02 | Stop reason: HOSPADM

## 2021-01-26 RX ORDER — SODIUM CHLORIDE 0.9 % (FLUSH) 0.9 %
5-40 SYRINGE (ML) INJECTION EVERY 8 HOURS
Status: DISCONTINUED | OUTPATIENT
Start: 2021-01-26 | End: 2021-02-02 | Stop reason: HOSPADM

## 2021-01-26 RX ORDER — ACETAMINOPHEN 650 MG/1
650 SUPPOSITORY RECTAL
Status: DISCONTINUED | OUTPATIENT
Start: 2021-01-26 | End: 2021-02-02 | Stop reason: HOSPADM

## 2021-01-26 RX ORDER — ONDANSETRON 2 MG/ML
4 INJECTION INTRAMUSCULAR; INTRAVENOUS
Status: DISCONTINUED | OUTPATIENT
Start: 2021-01-26 | End: 2021-02-02 | Stop reason: HOSPADM

## 2021-01-26 RX ORDER — SODIUM CHLORIDE 0.9 % (FLUSH) 0.9 %
5-40 SYRINGE (ML) INJECTION AS NEEDED
Status: DISCONTINUED | OUTPATIENT
Start: 2021-01-26 | End: 2021-02-02 | Stop reason: HOSPADM

## 2021-01-26 RX ORDER — ENOXAPARIN SODIUM 100 MG/ML
30 INJECTION SUBCUTANEOUS DAILY
Status: DISCONTINUED | OUTPATIENT
Start: 2021-01-26 | End: 2021-02-02 | Stop reason: HOSPADM

## 2021-01-26 RX ORDER — PROMETHAZINE HYDROCHLORIDE 25 MG/1
12.5 TABLET ORAL
Status: DISCONTINUED | OUTPATIENT
Start: 2021-01-26 | End: 2021-02-02 | Stop reason: HOSPADM

## 2021-01-26 RX ORDER — POLYETHYLENE GLYCOL 3350 17 G/17G
17 POWDER, FOR SOLUTION ORAL DAILY PRN
Status: DISCONTINUED | OUTPATIENT
Start: 2021-01-26 | End: 2021-02-02 | Stop reason: HOSPADM

## 2021-01-26 RX ADMIN — ENOXAPARIN SODIUM 30 MG: 30 INJECTION SUBCUTANEOUS at 11:14

## 2021-01-26 RX ADMIN — ACETAMINOPHEN 650 MG: 325 TABLET ORAL at 21:34

## 2021-01-26 RX ADMIN — Medication 10 ML: at 21:35

## 2021-01-26 RX ADMIN — ACETAMINOPHEN 650 MG: 325 TABLET ORAL at 11:17

## 2021-01-26 RX ADMIN — AMLODIPINE BESYLATE 5 MG: 5 TABLET ORAL at 11:14

## 2021-01-26 RX ADMIN — Medication 2 TABLET: at 11:14

## 2021-01-26 RX ADMIN — Medication 10 ML: at 14:02

## 2021-01-26 RX ADMIN — SODIUM CHLORIDE 50 ML/HR: 9 INJECTION, SOLUTION INTRAVENOUS at 03:13

## 2021-01-26 NOTE — ROUTINE PROCESS
End of Shift Note Bedside shift change report given to Lyla (oncoming nurse) by Nelson Acevedo RN (offgoing nurse). Report included the following information SBAR Shift worked:  7A-7P Shift summary and any significant changes:  
  n/a Concerns for physician to address:  na  
Zone phone for oncoming shift:   n/a Patient Information Vin Shanks 80 y.o. 
1/25/2021  8:15 PM by Farooq Thomas MD. Vin Shanks was admitted from Home 
 
Problem List 
Patient Active Problem List  
 Diagnosis Date Noted  Hip pain 01/26/2021  Fall 01/26/2021  General weakness 01/26/2021  Orthostatic hypertension 01/26/2021  Hypertension 01/01/2021  CAD (coronary artery disease) 01/01/2021  Orthostatic hypotension 12/31/2020  Generalized weakness 12/29/2020 Past Medical History:  
Diagnosis Date  CAD (coronary artery disease)  Hypertension Core Measures: CVA: No No 
CHF:No No 
PNA:No No 
 
Activity: 
Activity Level: Up with Assistance Number times ambulated in hallways past shift: 0 Number of times OOB to chair past shift: 0 Cardiac:  
Cardiac Monitoring: No     
  
 
Access:  
Current line(s): PIV Genitourinary:  
Urinary status: incontinent external catheter Respiratory:  
O2 Device: Room air Chronic home O2 use?: NO Incentive spirometer at bedside: NO 
  
 
GI: 
Last Bowel Movement Date: 01/25/21 Current diet:  DIET REGULAR 
DIET ONE TIME MESSAGE 
DIET ONE TIME MESSAGE Passing flatus: YES Tolerating current diet: yes Pain Management:  
Patient states pain is manageable on current regimen: YES Skin: 
Cordell Score: 20 Interventions: PT/OT consult and limit briefs Patient Safety: 
Fall Score: Total Score: 4 Interventions: gripper socks and pt to call before getting OOB High Fall Risk: Yes DVT prophylaxis: DVT prophylaxis Med- Yes DVT prophylaxis SCD or ADAIR- No  
 
Wounds: (If Applicable) Wounds- No 
 
Active Consults: 
None Length of Stay: 
Expected LOS: - - - Actual LOS: 0 Discharge Plan: No  
 
 
Dianne Rubio RN 
 
 
 
 
 
 
 
 
 
 
 
 
 
 
 Patient was admitted with Cdiff colitis (had history of Cdiff in the past) Patient was started on PO Vancomycin and Flagyl IV- which has since been completed. Plan is to continue Vancomycin for 5 more weeks and f/u with Dr Ramirez at [place] for possible fecal transplant.

## 2021-01-26 NOTE — ED NOTES
TRANSFER - OUT REPORT:    Verbal report given to Joseluis Mccarthy(name) on Magi Slot  being transferred to [facility](unit) for routine progression of care       Report consisted of patients Situation, Background, Assessment and   Recommendations(SBAR). Information from the following report(s) SBAR, ED Summary, [facility] and Recent Results was reviewed with the receiving nurse. Lines:   Peripheral IV 01/25/21 Right Arm (Active)   Site Assessment Clean, dry, & intact 01/25/21 2129   Phlebitis Assessment 0 01/25/21 2129   Infiltration Assessment 0 01/25/21 2129   Dressing Status Clean, dry, & intact 01/25/21 2129   Action Taken Blood drawn 01/25/21 2129        Opportunity for questions and clarification was provided.       Patient transported with:   Registered Nurse  Tech

## 2021-01-26 NOTE — ED PROVIDER NOTES
EMERGENCY DEPARTMENT HISTORY AND PHYSICAL EXAM    Please note that this dictation was completed with [employer], the computer voice recognition software. Quite often unanticipated grammatical, syntax, homophones, and other interpretive errors are inadvertently transcribed by the computer software. Please disregard these errors. Please excuse any errors that have escaped final proofreading. Date: 1/25/2021  Patient Name: Velia Hendricks  Patient Age and Sex: 80 y.o. female    History of Presenting Illness     Chief Complaint   Patient presents with    Fall     Pt arrived by EMS form home for GLF do to bilateral lower extremity weakness starting tonight. Pt walkes with rolator at baseline. Pt states the left leg is worse then the right. Per EMS pt did not hit her head.  Extremity Weakness       History Provided By: Patient    HPI: Velia Hendricks, is a 80 y.o. female whose medical history is noted below and includes a recent hospitalization for lower extremity weakness followed by inpatient rehabilitation, discharged from rehab facility today, presents to the ED via ambulance after a nonsyncopal fall at home. The patient lives alone and has an aide that helps her 3-4 times a week. She states that she usually gets around with a walker but due to the weakness in her legs, she just received a wheelchair after this last rehabilitation admission. She lost her balance while trying to transfer today and fell from a standing position onto the floor. Did not hit her head or lose consciousness. She complains of right knee pain mainly but is also concerned about the persistent weakness in her legs. She denies any fevers or chills. No respiratory symptoms or chest pain. At baseline incontinent of urine but no dysuria or flank pain. She has a son that lives close by but is unable to stay with her or help her significantly due to health issues he has on his own. No other relatives close by.     Pt denies any other alleviating or exacerbating factors. No other associated signs or symptoms. There are no other complaints, changes or physical findings at this time. PCP: Naren Greenberg MD    Past History   All documented elements of the [address] reviewed and verified by me. -Curtis Candelaria MD    Past Medical History:  Past Medical History:   Diagnosis Date    CAD (coronary artery disease)     Hypertension        Past Surgical History:  History reviewed. No pertinent surgical history. Family History:  History reviewed. No pertinent family history. Social History:  Social History     Tobacco Use    Smoking status: Never Smoker    Smokeless tobacco: Never Used   Substance Use Topics    Alcohol use: No    Drug use: No       Allergies: Allergies   Allergen Reactions    Codeine Hives    Pcn [Penicillins] Hives    Sulfa (Sulfonamide Antibiotics) Hives       Review of Systems   All other systems reviewed and negative    Review of Systems   Constitutional: Negative for appetite change and fever. HENT: Negative. Eyes: Negative. Respiratory: Negative for cough and shortness of breath. Cardiovascular: Negative for chest pain and palpitations. Gastrointestinal: Negative for abdominal pain, diarrhea, nausea and vomiting. Endocrine: Negative. Negative for polydipsia and polyuria. Genitourinary: Negative for dysuria and flank pain. Musculoskeletal: Positive for arthralgias and gait problem. Negative for joint swelling. Skin: Negative. Neurological: Positive for weakness. Negative for seizures, syncope, facial asymmetry, speech difficulty, numbness and headaches. Hematological: Negative. All other systems reviewed and are negative. Physical Exam   Reviewed patients vital signs and nursing note    Physical Exam  Vitals signs and nursing note reviewed. HENT:      Head: Atraumatic. Mouth/Throat:      Mouth: Mucous membranes are moist.   Eyes:      General: No scleral icterus. Extraocular Movements: Extraocular movements intact. Conjunctiva/sclera: Conjunctivae normal.      Pupils: Pupils are equal, round, and reactive to light. Neck:      Musculoskeletal: Normal range of motion and neck supple. Cardiovascular:      Rate and Rhythm: Normal rate and regular rhythm. Pulses: Normal pulses. Heart sounds: Normal heart sounds. Pulmonary:      Effort: Pulmonary effort is normal.      Breath sounds: Normal breath sounds. Abdominal:      Palpations: Abdomen is soft. Tenderness: There is no abdominal tenderness. There is no right CVA tenderness or left CVA tenderness. Musculoskeletal:         General: No deformity. Right lower leg: No edema. Left lower leg: No edema. Comments: Right knee: Tender to palpation over entire joint. No swelling or joint effusion. No erythema. No contusions, lacerations or other evidence of injury. No deformity. Passive range of motion possible with moderate amount of pain, significant crepitus noted. Skin:     General: Skin is warm and dry. Capillary Refill: Capillary refill takes less than 2 seconds. Findings: No erythema. Neurological:      General: No focal deficit present. Mental Status: She is alert and oriented to person, place, and time. Sensory: Sensation is intact. Motor: Weakness present. Deep Tendon Reflexes: Babinski sign absent on the right side. Babinski sign absent on the left side. Reflex Scores:       Patellar reflexes are 2+ on the right side and 2+ on the left side. Psychiatric:         Mood and Affect: Mood normal.         Behavior: Behavior normal.         Diagnostic Study Results     Labs - I have personally reviewed and interpreted all laboratory results.  Ivan Vega MD, MSc  Recent Results (from the past 24 hour(s))   CBC WITH AUTOMATED DIFF    Collection Time: 01/25/21  9:11 PM   Result Value Ref Range    WBC 6.4 3.6 - 11.0 K/uL    RBC 4.19 3.80 - 5.20 M/uL    HGB 10.7 (L) 11.5 - 16.0 g/dL    HCT 32.8 (L) 35.0 - 47.0 %    MCV 78.3 (L) 80.0 - 99.0 FL    MCH 25.5 (L) 26.0 - 34.0 PG    MCHC 32.6 30.0 - 36.5 g/dL    RDW 16.0 (H) 11.5 - 14.5 %    PLATELET 091 877 - 646 K/uL    MPV 9.9 8.9 - 12.9 FL    NRBC 0.0 0  WBC    ABSOLUTE NRBC 0.00 0.00 - 0.01 K/uL    NEUTROPHILS 85 (H) 32 - 75 %    BAND NEUTROPHILS 3 %    LYMPHOCYTES 9 (L) 12 - 49 %    MONOCYTES 3 (L) 5 - 13 %    EOSINOPHILS 0 0 - 7 %    BASOPHILS 0 0 - 1 %    IMMATURE GRANULOCYTES 0 0.0 - 0.5 %    ABS. NEUTROPHILS 5.6 1.8 - 8.0 K/UL    ABS. LYMPHOCYTES 0.6 (L) 0.8 - 3.5 K/UL    ABS. MONOCYTES 0.2 0.0 - 1.0 K/UL    ABS. EOSINOPHILS 0.0 0.0 - 0.4 K/UL    ABS. BASOPHILS 0.0 0.0 - 0.1 K/UL    ABS. IMM.  GRANS. 0.0 0.00 - 0.04 K/UL    DF MANUAL      RBC COMMENTS ANISOCYTOSIS  1+       METABOLIC PANEL, BASIC    Collection Time: 01/25/21  9:11 PM   Result Value Ref Range    Sodium 142 136 - 145 mmol/L    Potassium 4.3 3.5 - 5.1 mmol/L    Chloride 110 (H) 97 - 108 mmol/L    CO2 26 21 - 32 mmol/L    Anion gap 6 5 - 15 mmol/L    Glucose 105 (H) 65 - 100 mg/dL    BUN 21 (H) 6 - 20 MG/DL    Creatinine 1.29 (H) 0.55 - 1.02 MG/DL    BUN/Creatinine ratio 16 12 - 20      GFR est AA 47 (L) >60 ml/min/1.73m2    GFR est non-AA 39 (L) >60 ml/min/1.73m2    Calcium 9.3 8.5 - 10.1 MG/DL   URINALYSIS W/ REFLEX CULTURE    Collection Time: 01/25/21 11:19 PM    Specimen: Urine   Result Value Ref Range    Color YELLOW/STRAW      Appearance CLEAR CLEAR      Specific gravity 1.013 1.003 - 1.030      pH (UA) 6.0 5.0 - 8.0      Protein Negative NEG mg/dL    Glucose Negative NEG mg/dL    Ketone Negative NEG mg/dL    Bilirubin Negative NEG      Blood Negative NEG      Urobilinogen 0.2 0.2 - 1.0 EU/dL    Nitrites Negative NEG      Leukocyte Esterase Negative NEG      WBC 0-4 0 - 4 /hpf    RBC 0-5 0 - 5 /hpf    Epithelial cells FEW FEW /lpf    Bacteria Negative NEG /hpf    UA:UC IF INDICATED CULTURE NOT INDICATED BY UA RESULT CNI Hyaline cast 0-2 0 - 5 /lpf       Radiologic Studies - I have personally reviewed and interpreted all imaging studies and agree with radiology interpretation and report. Cooper Paul MD, MSc  XR CHEST PORT   Final Result   Minimal bibasilar atelectasis, stable right hemidiaphragm elevation. .  .         XR KNEE RT 3 V   Final Result   No acute bony abnormality. Medical Decision Making   I am the first provider for this patient. Records Reviewed: I reviewed our electronic medical record system for any past medical records that were available that may contribute to the patient's current condition, including their PMH, surgical history, social and family history. Reviewed the nursing notes and vital signs from today's visit. Nursing notes will be reviewed as they become available in realtime while the pt has been in the ED. In addition, I read most recent discharge summaries, if available and reviewed prior ECGs or imaging studies for comparison purposes. Adam De Leon MD Msc    Vital Signs-Reviewed the patient's vital signs. Patient Vitals for the past 24 hrs:   Temp Pulse Resp BP SpO2   01/26/21 0600 98.6 °F (37 °C) 71 18 136/64 97 %   01/26/21 0345 98 °F (36.7 °C)  16 123/76 96 %   01/26/21 0100 98.2 °F (36.8 °C) 81 9 130/69 95 %   01/26/21 0045  83 10 (!) 122/59 96 %   01/26/21 0030  87 14  95 %   01/26/21 0015  80 13 (!) 122/57 93 %   01/26/21 0000  83 13 121/66 94 %   01/25/21 2345  82 13 126/61 94 %   01/25/21 2330  82 13 (!) 131/56 95 %   01/25/21 2315  92 20 (!) 148/72 96 %   01/25/21 2300  84 14 136/66 97 %   01/25/21 2012 98.4 °F (36.9 °C) 95 18 (!) 152/67 94 %       Provider Notes (Medical Decision Making):   Briefly, an elderly and pleasant female presents after a nonsyncopal fall at her home. No evidence of acute injury/trauma on exam.  Reason for fall was weakness in her legs which she is not experience for some time. Normal vital signs.   Patient noted to have significant bilateral weakness in her legs, she states this is unchanged from what she experienced earlier and the reason why she initially went to rehab. She is able to move her legs minimally but not able to lift either leg against gravity. Not strong enough to transfer due to the weakness. Based on this evaluation, I do not think that she is able to go home today and will require further rehabilitation. I also recommend consultation to care coordination for rehab placement. We have contacted her son who confirmed that he is not able to stay with her and help her further. Although she has a wheelchair, she is unable to transfer from wheelchair to bed/vice versa and therefore unable to meet her basic needs including bathroom visits. The home health care she currently receives is insufficient for her needs. She has no other or new medical complaints or symptoms. I will send basic labs and obtain a urinalysis to make sure that there is no underlying new medical conditions that is causing her weakness but I anticipate that this is a chronic issue and that she was discharged home from rehab without having established sufficient care at home. ED Course:   Initial assessment performed. The patients presenting problems have been discussed, and they are in agreement with the care plan formulated and outlined with them. I have encouraged them to ask questions as they arise throughout their visit. Consult Note:  Ruthann Barahona MD spoke with dr Shari uGadarrama, admitting hospitalist,   Discussed pt's hx, physical exam and available diagnostic and imaging results. Reviewed care plans. Agree with management and plan thus far. DISPOSITION: ADMIT  Patient is being admitted to the hospital.  Their test results and reasons for admission have been discussed. The patient and/or available family express agreement with and understanding of the need to be admitted and their admission diagnosis.      Thank you for resuming the care of this patient. Please don't hesitate to contact me in the emergency department if you  have any additional questions. Yuliana Anglin MD, MSc        Austen Pascual MD, am the attending of record for this patient encounter. Diagnosis     Clinical Impression:   1. Weakness    2. Fall, initial encounter        Attestation:  I personally performed the services described in this documentation on this date 1/25/2021 for patient Maria Luisa Garcia.   Yuliana Anglin MD

## 2021-01-26 NOTE — ED NOTES
Pt arrived to ED via EMS c/o falling and weakness in lower extremities. Pt is AOX4 and monitored X3. 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 Pt reports that she was released from a rehab facility today. Pt states \"my legs just felt like rubber and then I went down. \"

## 2021-01-26 NOTE — PROGRESS NOTES
Physical Therapy    Received order, chart reviewed. Pt in holding area, environment not conducive to safe mobilization. Will follow as appropriate.     Yair Conte, PT

## 2021-01-26 NOTE — ED NOTES
Bedside and Verbal shift change report given to Jhon Ayon (oncoming nurse) by Fouzia Quigley (offgoing nurse). Report included the following information SBAR, ED Summary, MAR and Recent Results.

## 2021-01-26 NOTE — H&P
Hospitalist Admission Note    NAME: Mariola Amaya   :  1934   MRN:  [id number]     Date/Time:  2021 2:01 AM    Patient PCP: Christopher Downey MD  _____________________________________________________________________  Given the patient's current clinical presentation, I have a high level of concern for decompensation if discharged from the emergency department. Complex decision making was performed, which includes reviewing the patient's available past medical records, laboratory results, and x-ray films. My assessment of this patient's clinical condition and my plan of care is as follows. Assessment / Plan:    Generalized weakness  Hx of Symptomatic orthostatic hypotension  S/P GLM at home  CAD  HTN  Anemia Mild dropped in H/H  Check iron studies. FOBT. No evidence of Hemolysis or active bleeding    Admit for Observation  PT/OT/CM   Likely will need Rehab   Holding home BP meds  Compression stockings  No evidence of infection  PE is non focal  CXR negative  Pelvis xray is negative  Resume Home meds as per Med rec. Code Status: Full  Surrogate Decision Maker: Son  DVT Prophylaxis: Lovenox  GI Prophylaxis: not indicated  Baseline: Dischareg from Rehab yesterday          Subjective:   CHIEF COMPLAINT:  weakness       HISTORY OF PRESENT ILLNESS:     Mariola Amaya is a 80 y.o.  female who presentsto ED via EMS c/o falling and weakness in lower extremities. Pt is AOX4 and monitored X3. Pt reports that she was released from a rehab facility yesterday. Pt states \"my legs just felt like rubber and then I went down. she presented with similar complaints recently  and was discharged  to rehab. See discharge summary for detail below. At that time, pt received IVF , D/C her losartan ,PT/OT and then discharged to rehab . Denies Fever chills rigors sob CP, Focal weakness. We were asked to admit for work up and evaluation of the above problems.        We were asked to admit for work up and evaluation of the above problems. Admit date: 12/29/2020  Discharge date and time: 1/7/2021  DISCHARGE DIAGNOSIS  Generalized weakness- stable    Symptomatic orthostatic hypotension- resolved    admitted after second visit to ER with c/o generalized weakness / dizziness   antihypertensives held till 1/1 then resumed except losartan  OOB to chair for meals   SNF placement today  , her son updated with a phone Araceli Rodríguez  Leukocytosis likely related to steroid use. We will continue to monitor  Atypical chest pain:probably related to GI noncardiac we will continue to monitor. Constipation patient is on a bowel regimen now we will continue to monitor. COVID positive , asymptomatic. CAD  HTN  BP Within acceptable range now ,we will continue to monitor. Past Medical History:   Diagnosis Date    CAD (coronary artery disease)     Hypertension         No past surgical history on file. Social History     Tobacco Use    Smoking status: Never Smoker    Smokeless tobacco: Never Used   Substance Use Topics    Alcohol use: No        No family history on file. Allergies   Allergen Reactions    Codeine Hives    Pcn [Penicillins] Hives    Sulfa (Sulfonamide Antibiotics) Hives        Prior to Admission medications    Medication Sig Start Date End Date Taking? Authorizing Provider   amLODIPine (NORVASC) 5 mg tablet Take 1 Tab by mouth daily. 1/7/21   Destini Locke MD   cholecalciferol (VITAMIN D3) (1000 Units /25 mcg) tablet Take 2 Tabs by mouth daily. 1/7/21   Destini Locke MD   dexAMETHasone (DECADRON) 4 mg tablet I tab daily for 2 days 1/7/21   Destini Locke MD       REVIEW OF SYSTEMS:     I am not able to complete the review of systems because:    The patient is intubated and sedated    The patient has altered mental status due to his acute medical problems    The patient has baseline aphasia from prior stroke(s)    The patient has baseline dementia and is not reliable historian    The patient is in acute medical distress and unable to provide information           Total of 12 systems reviewed as follows:       POSITIVE= underlined text  Negative = text not underlined  General:  fever, chills, sweats, generalized weakness, weight loss/gain,      loss of appetite   Eyes:    blurred vision, eye pain, loss of vision, double vision  ENT:    rhinorrhea, pharyngitis   Respiratory:   cough, sputum production, SOB, AGUILERA, wheezing, pleuritic pain   Cardiology:   chest pain, palpitations, orthopnea, PND, edema, syncope   Gastrointestinal:  abdominal pain , N/V, diarrhea, dysphagia, constipation, bleeding   Genitourinary:  frequency, urgency, dysuria, hematuria, incontinence   Muskuloskeletal :  arthralgia, myalgia, back pain  Hematology:  easy bruising, nose or gum bleeding, lymphadenopathy   Dermatological: rash, ulceration, pruritis, color change / jaundice  Endocrine:   hot flashes or polydipsia   Neurological:  headache, dizziness, confusion, focal weakness, paresthesia,     Speech difficulties, memory loss, gait difficulty  Psychological: Feelings of anxiety, depression, agitation    Objective:   VITALS:    Visit Vitals  /69   Pulse 81   Temp 98.2 °F (36.8 °C)   Resp 9   Ht 5' 4\" (1.626 m)   Wt 65.8 kg (145 lb 1 oz)   SpO2 95%   BMI 24.90 kg/m²       PHYSICAL EXAM:  General:          Thin . Alert, cooperative, pleasant no acute distress    EENT:              EOMI. Anicteric sclerae. MMM poor dentition   Resp:                no wheezing or rales. No accessory muscle use, room air , dry cough   CV:                  S1S2,  No edema ,   GI:                   Soft,  Non tender. +Bowel sounds + flatus constipation   Neurologic:       Alert and oriented X 3, normal speech,   Psych:   . Not anxious nor agitated ? manipulative  Skin:                No rashes.   No jaundice Dry , _______________________________________________________________________  Care Plan discussed with:    Comments   Patient Y    Family      RN Y    Care Manager                    Consultant:  [facility]   _______________________________________________________________________  Expected  Disposition:   Home with Family    HH/PT/OT/RN    SNF/LTC Y   NEGRO Y   ________________________________________________________________________  TOTAL TIME:  72  Minutes    Critical Care Provided     Minutes non procedure based      Comments    Y Reviewed previous records   >50% of visit spent in counseling and coordination of care Y Discussion with patient and/or family and questions answered       Given the patient's current clinical presentation, I have a high level of concern for decompensation if discharged from the ED. Complex decision making was performed which includes reviewing the patient's available past medical records, laboratory results, and Xray films. I have also directly communicated my plan and discussed this case with the involved ED physician.     ____________________________________________________________________  Gregor Yung MD    Procedures: see electronic medical records for all procedures/Xrays and details which were not copied into this note but were reviewed prior to creation of Plan.     LAB DATA REVIEWED:    Recent Results (from the past 24 hour(s))   CBC WITH AUTOMATED DIFF    Collection Time: 01/25/21  9:11 PM   Result Value Ref Range    WBC 6.4 3.6 - 11.0 K/uL    RBC 4.19 3.80 - 5.20 M/uL    HGB 10.7 (L) 11.5 - 16.0 g/dL    HCT 32.8 (L) 35.0 - 47.0 %    MCV 78.3 (L) 80.0 - 99.0 FL    MCH 25.5 (L) 26.0 - 34.0 PG    MCHC 32.6 30.0 - 36.5 g/dL    RDW 16.0 (H) 11.5 - 14.5 %    PLATELET 489 448 - 101 K/uL    MPV 9.9 8.9 - 12.9 FL    NRBC 0.0 0  WBC    ABSOLUTE NRBC 0.00 0.00 - 0.01 K/uL    NEUTROPHILS 85 (H) 32 - 75 %    BAND NEUTROPHILS 3 %    LYMPHOCYTES 9 (L) 12 - 49 %    MONOCYTES 3 (L) 5 - 13 % EOSINOPHILS 0 0 - 7 %    BASOPHILS 0 0 - 1 %    IMMATURE GRANULOCYTES 0 0.0 - 0.5 %    ABS. NEUTROPHILS 5.6 1.8 - 8.0 K/UL    ABS. LYMPHOCYTES 0.6 (L) 0.8 - 3.5 K/UL    ABS. MONOCYTES 0.2 0.0 - 1.0 K/UL    ABS. EOSINOPHILS 0.0 0.0 - 0.4 K/UL    ABS. BASOPHILS 0.0 0.0 - 0.1 K/UL    ABS. IMM.  GRANS. 0.0 0.00 - 0.04 K/UL    DF MANUAL      RBC COMMENTS ANISOCYTOSIS  1+       METABOLIC PANEL, BASIC    Collection Time: 01/25/21  9:11 PM   Result Value Ref Range    Sodium 142 136 - 145 mmol/L    Potassium 4.3 3.5 - 5.1 mmol/L    Chloride 110 (H) 97 - 108 mmol/L    CO2 26 21 - 32 mmol/L    Anion gap 6 5 - 15 mmol/L    Glucose 105 (H) 65 - 100 mg/dL    BUN 21 (H) 6 - 20 MG/DL    Creatinine 1.29 (H) 0.55 - 1.02 MG/DL    BUN/Creatinine ratio 16 12 - 20      GFR est AA 47 (L) >60 ml/min/1.73m2    GFR est non-AA 39 (L) >60 ml/min/1.73m2    Calcium 9.3 8.5 - 10.1 MG/DL   URINALYSIS W/ REFLEX CULTURE    Collection Time: 01/25/21 11:19 PM    Specimen: Urine   Result Value Ref Range    Color YELLOW/STRAW      Appearance CLEAR CLEAR      Specific gravity 1.013 1.003 - 1.030      pH (UA) 6.0 5.0 - 8.0      Protein Negative NEG mg/dL    Glucose Negative NEG mg/dL    Ketone Negative NEG mg/dL    Bilirubin Negative NEG      Blood Negative NEG      Urobilinogen 0.2 0.2 - 1.0 EU/dL    Nitrites Negative NEG      Leukocyte Esterase Negative NEG      WBC 0-4 0 - 4 /hpf    RBC 0-5 0 - 5 /hpf    Epithelial cells FEW FEW /lpf    Bacteria Negative NEG /hpf    UA:UC IF INDICATED CULTURE NOT INDICATED BY UA RESULT CNI      Hyaline cast 0-2 0 - 5 /lpf

## 2021-01-27 PROBLEM — R53.81 PHYSICAL DECONDITIONING: Status: ACTIVE | Noted: 2021-01-27

## 2021-01-27 LAB
ANION GAP SERPL CALC-SCNC: 6 MMOL/L (ref 5–15)
BASOPHILS # BLD: 0 K/UL (ref 0–0.1)
BASOPHILS NFR BLD: 1 % (ref 0–1)
BUN SERPL-MCNC: 20 MG/DL (ref 6–20)
BUN/CREAT SERPL: 18 (ref 12–20)
CALCIUM SERPL-MCNC: 9.2 MG/DL (ref 8.5–10.1)
CHLORIDE SERPL-SCNC: 109 MMOL/L (ref 97–108)
CO2 SERPL-SCNC: 25 MMOL/L (ref 21–32)
CREAT SERPL-MCNC: 1.11 MG/DL (ref 0.55–1.02)
DIFFERENTIAL METHOD BLD: ABNORMAL
EOSINOPHIL # BLD: 0.4 K/UL (ref 0–0.4)
EOSINOPHIL NFR BLD: 10 % (ref 0–7)
ERYTHROCYTE [DISTWIDTH] IN BLOOD BY AUTOMATED COUNT: 15.9 % (ref 11.5–14.5)
GLUCOSE SERPL-MCNC: 97 MG/DL (ref 65–100)
HCT VFR BLD AUTO: 30.8 % (ref 35–47)
HGB BLD-MCNC: 10 G/DL (ref 11.5–16)
IMM GRANULOCYTES # BLD AUTO: 0 K/UL (ref 0–0.04)
IMM GRANULOCYTES NFR BLD AUTO: 1 % (ref 0–0.5)
LYMPHOCYTES # BLD: 1.7 K/UL (ref 0.8–3.5)
LYMPHOCYTES NFR BLD: 40 % (ref 12–49)
MAGNESIUM SERPL-MCNC: 1.9 MG/DL (ref 1.6–2.4)
MCH RBC QN AUTO: 25.3 PG (ref 26–34)
MCHC RBC AUTO-ENTMCNC: 32.5 G/DL (ref 30–36.5)
MCV RBC AUTO: 77.8 FL (ref 80–99)
MONOCYTES # BLD: 0.5 K/UL (ref 0–1)
MONOCYTES NFR BLD: 13 % (ref 5–13)
NEUTS SEG # BLD: 1.5 K/UL (ref 1.8–8)
NEUTS SEG NFR BLD: 35 % (ref 32–75)
NRBC # BLD: 0 K/UL (ref 0–0.01)
NRBC BLD-RTO: 0 PER 100 WBC
PLATELET # BLD AUTO: 246 K/UL (ref 150–400)
PMV BLD AUTO: 10.4 FL (ref 8.9–12.9)
POTASSIUM SERPL-SCNC: 4.1 MMOL/L (ref 3.5–5.1)
RBC # BLD AUTO: 3.96 M/UL (ref 3.8–5.2)
SODIUM SERPL-SCNC: 140 MMOL/L (ref 136–145)
WBC # BLD AUTO: 4.2 K/UL (ref 3.6–11)

## 2021-01-27 PROCEDURE — 96372 THER/PROPH/DIAG INJ SC/IM: CPT

## 2021-01-27 PROCEDURE — 85025 COMPLETE CBC W/AUTO DIFF WBC: CPT

## 2021-01-27 PROCEDURE — 65390000012 HC CONDITION CODE 44 OBSERVATION

## 2021-01-27 PROCEDURE — 74011250637 HC RX REV CODE- 250/637: Performed by: HOSPITALIST

## 2021-01-27 PROCEDURE — 99218 HC RM OBSERVATION: CPT

## 2021-01-27 PROCEDURE — 80048 BASIC METABOLIC PNL TOTAL CA: CPT

## 2021-01-27 PROCEDURE — 36415 COLL VENOUS BLD VENIPUNCTURE: CPT

## 2021-01-27 PROCEDURE — 83735 ASSAY OF MAGNESIUM: CPT

## 2021-01-27 PROCEDURE — 97530 THERAPEUTIC ACTIVITIES: CPT

## 2021-01-27 PROCEDURE — 97161 PT EVAL LOW COMPLEX 20 MIN: CPT

## 2021-01-27 PROCEDURE — 74011250636 HC RX REV CODE- 250/636: Performed by: HOSPITALIST

## 2021-01-27 RX ADMIN — Medication 10 ML: at 21:47

## 2021-01-27 RX ADMIN — AMLODIPINE BESYLATE 5 MG: 5 TABLET ORAL at 08:59

## 2021-01-27 RX ADMIN — Medication 10 ML: at 14:34

## 2021-01-27 RX ADMIN — ENOXAPARIN SODIUM 30 MG: 30 INJECTION SUBCUTANEOUS at 08:59

## 2021-01-27 RX ADMIN — Medication 2000 UNITS: at 14:34

## 2021-01-27 RX ADMIN — Medication 10 ML: at 05:31

## 2021-01-27 NOTE — PROGRESS NOTES
Problem: Mobility Impaired (Adult and Pediatric)  Goal: *Acute Goals and Plan of Care (Insert Text)  Description: FUNCTIONAL STATUS PRIOR TO ADMISSION: Pt lives in a one story alone. She had just returned home from [facility] and had fall necessitating return to ER. She states that she has a caregiver 5hrs 3x/week that assists with IADLs and showers. She states she used a rollator. HOME SUPPORT PRIOR TO ADMISSION: The patient lived alone with limited support. Physical Therapy Goals  Initiated 2021  1. Patient will move from supine to sit and sit to supine , scoot up and down, and roll side to side in bed with independence within 7 day(s). 2.  Patient will transfer from bed to chair and chair to bed with supervision/set-up using the least restrictive device within 7 day(s). 3.  Patient will perform sit to stand with supervision/set-up within 7 day(s). 4.  Patient will ambulate with supervision/set-up for 100 feet with the least restrictive device within 7 day(s). 5.  Patient will ascend/descend 2 stairs with handrail(s) with minimal assistance/contact guard assist within 7 day(s). Outcome: Not Met   PHYSICAL THERAPY EVALUATION  Patient: Kevan Moody (81 y.o. 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 XXXA]  Hip pain [M25.559]        Precautions: fall      ASSESSMENT  Based on the objective data described below, the patient presents with limitations in gait, functional mobility, balance and activity tolerance below per previous baseline. She is min A of 1-2 for bed mobility. She transfers sit to stand with min A of 2. She maintains stand with min A of 2 but requires mod A of 2 to side step at EOB 2-3 steps. Pt returned to bed with min A of 2.   Vitals:  Supine: 119/60, 89 HR, 95% room air  Sittin/64, 93, 95%  Standin/81, 114, 95%  Current Level of Function Impacting Discharge (mobility/balance): min - mod A of 2    Functional Outcome Measure: The patient scored 25/100 on the barthel outcome measure which is indicative of 75% impairment. Other factors to consider for discharge: A of 2 currently, lives alone     Patient will benefit from skilled therapy intervention to address the above noted impairments. PLAN :  Recommendations and Planned Interventions: bed mobility training, transfer training, gait training, therapeutic exercises, patient and family training/education, and therapeutic activities      Frequency/Duration: Patient will be followed by physical therapy:  4 times a week to address goals. Recommendation for discharge: (in order for the patient to meet his/her long term goals)  Therapy up to 5 days/week in SNF setting    This discharge recommendation:  A follow-up discussion with the attending provider and/or case management is planned    IF patient discharges home will need the following DME: to be determined (TBD)         SUBJECTIVE:   Patient stated I fell, that's why I had to come back.     OBJECTIVE DATA SUMMARY:   HISTORY:    Past Medical History:   Diagnosis Date    CAD (coronary artery disease)     Hypertension    History reviewed. No pertinent surgical history. Home Situation  Home Environment: Private residence  # Steps to Enter: 2  Rails to Enter: No  One/Two Story Residence: One story  Living Alone: Yes  Support Systems: Child(jorden), Family member(s), Friends \ neighbors, Home care staff  Current DME Used/Available at Home: Shower chair, Walker, rollator, Commode, bedside, Cane, straight  Tub or Shower Type: Tub/Shower combination    EXAMINATION/PRESENTATION/DECISION MAKING:   Critical Behavior:  Neurologic State: Alert  Orientation Level: Oriented X4  Cognition: Follows commands     Hearing:   Auditory  Auditory Impairment: None    Range Of Motion:  AROM: Generally decreased, functional                       Strength:    Strength: Generally decreased, functional Tone & Sensation:   Tone: Normal                              Coordination:  Coordination: Generally decreased, functional       Functional Mobility:  Bed Mobility:  Rolling: Minimum assistance  Supine to Sit: Minimum assistance;Assist x2  Sit to Supine: Minimum assistance;Assist x2  Scooting: Minimum assistance  Transfers:  Sit to Stand: Minimum assistance;Assist x2  Stand to Sit: Minimum assistance;Assist x2                       Balance:   Sitting: Intact  Standing: Impaired  Standing - Static: Fair;Constant support  Standing - Dynamic : Fair;Constant support  Ambulation/Gait Training:      Side step at EOB x 2-3 steps only, mod A of 2, HHA             Functional Measure:  Barthel Index:    Bathin  Bladder: 5  Bowels: 5  Groomin  Dressin  Feeding: 10  Mobility: 0  Stairs: 0  Toilet Use: 0  Transfer (Bed to Chair and Back): 0  Total: 25/100       The Barthel ADL Index: Guidelines  1. The index should be used as a record of what a patient does, not as a record of what a patient could do. 2. The main aim is to establish degree of independence from any help, physical or verbal, however minor and for whatever reason. 3. The need for supervision renders the patient not independent. 4. A patient's performance should be established using the best available evidence. Asking the patient, friends/relatives and nurses are the usual sources, but direct observation and common sense are also important. However direct testing is not needed. 5. Usually the patient's performance over the preceding 24-48 hours is important, but occasionally longer periods will be relevant. 6. Middle categories imply that the patient supplies over 50 per cent of the effort. 7. Use of aids to be independent is allowed. Elbert Ramos., Barthel, D.W. ([id number]). Functional evaluation: the Barthel Index. [address] [facility] (14)2. JAMAICA Mcginnis, Maira Bustos., Dakota Young., Clarissa, [address] ().  Measuring the change indisability after inpatient rehabilitation; comparison of the responsiveness of the Barthel Index and Functional [place] Measure. Journal of Neurology, Neurosurgery, and Psychiatry, 66(4), 214-100. REJI Noyola, KONRAD Rajan, & Deann Boast, M.A. (2004.) Assessment of post-stroke quality of life in cost-effectiveness studies: The usefulness of the Barthel Index and the EuroQoL-5D. Quality of Life Research, 15, 299-91           Physical Therapy Evaluation Charge Determination   History Examination Presentation Decision-Making   HIGH Complexity :3+ comorbidities / personal factors will impact the outcome/ POC  HIGH Complexity : 4+ Standardized tests and measures addressing body structure, function, activity limitation and / or participation in recreation  LOW Complexity : Stable, uncomplicated  LOW Complexity : FOTO score of       Based on the above components, the patient evaluation is determined to be of the following complexity level: LOW         Activity Tolerance:   Fair    After treatment patient left in no apparent distress:   Supine in bed, Call bell within reach, and Side rails x 3    COMMUNICATION/EDUCATION:   The patients plan of care was discussed with: Registered nurse. Fall prevention education was provided and the patient/caregiver indicated understanding., Patient/family have participated as able in goal setting and plan of care. , and Patient/family agree to work toward stated goals and plan of care.     Thank you for this referral.  Tristin Chester, PT   Time Calculation: 17 mins

## 2021-01-27 NOTE — ROUTINE PROCESS
Bedside, Verbal and Written shift change report given to David Carbajal RN (oncoming nurse) by Estee Bae RN (offgoing nurse). Report included the following information SBAR, Kardex, MAR and Recent Results.

## 2021-01-27 NOTE — PROGRESS NOTES
ANISHA Plan:  > Home with HH (pt refusing rehab)  > Follow up appts  > family contact to confirm plan to ensure safety  > Caregivers in place  > 2nd IM letter    Reason for Admission:   General weakness, physical deconditioning                  RUR Score:   12               PCP: First and Last name:  Dr. Colten Hendricks   Name of Practice:    Are you a current patient: Yes/No:    Approximate date of last visit: Pt stated she has not seen her PCP in a few years, he just refills her BP meds    Can you participate in a virtual visit if needed:     Do you (patient/family) have any concerns for transition/discharge? Pt just left rehab 8 days ago and stated she did not want to return as she did not have a go experience. She requested to leave rehab. Plan for utilizing home health:   Pt is interested in having HH at discharge, will be arranged at discharge    Current Advanced Directive/Advance Care Plan:              Transition of Care Plan: Pt is an 79 y/o AAF, who resides alone and has caregivers in the home Mon-Fri 5 hrs per day with whom she believes to be [employer], stated her caregivers do not provide much care as they can't cook well and they only sit on the couch and watch television, SW inquired if a call needs to be made to the provider and she stated that her son plans to contact the company or to assist his mother with her needs, she has access to a rolling walker, rollator, and shower chair, her family transports to her appts or she uses Medicaid transportation to her appts, her pharmacy of choice is [facility] on [address] or [address], she recently left rehab at [facility] and stated she did not have a very good experience and requested to go home after 8 days. Pt has requested to return home at discharge with [facility] services. She stated she had HH in the past and would like to return to care.   FARRUKH will work to inquire which [address] [place] she had in the past and send referral.    CM will continue to follow and assist with additional discharge needs.       Care Management Interventions  PCP Verified by CM: Yes(Pt stated last visit was in the last couple of years, MD only refills meds)  Mode of Transport at Discharge: (Family to transport vs Medicaid transport )  Transition of Care Consult (CM Consult): Discharge Planning  Discharge Durable Medical Equipment: (Pt has RW, rollator, and shower chair)  Physical Therapy Consult: Yes  Occupational Therapy Consult: Yes  Current Support Network: Lives Alone(Pt resides in an apartment alone )  Confirm Follow Up Transport: Family(Pt uses family vs Medicaid transport to her appts )  Discharge Location  Discharge Placement: Home with home health    АННА Cuellar

## 2021-01-27 NOTE — PROGRESS NOTES
Bedside shift change report given to Vin Forbes RN (oncoming nurse) by Dima Mcgregor RN (offgoing nurse). Report included the following information SBAR and Kardex.

## 2021-01-28 LAB
BASOPHILS # BLD: 0.1 K/UL (ref 0–0.1)
BASOPHILS NFR BLD: 1 % (ref 0–1)
COVID-19 RAPID TEST, COVR: NOT DETECTED
DIFFERENTIAL METHOD BLD: ABNORMAL
EOSINOPHIL # BLD: 0.4 K/UL (ref 0–0.4)
EOSINOPHIL NFR BLD: 9 % (ref 0–7)
ERYTHROCYTE [DISTWIDTH] IN BLOOD BY AUTOMATED COUNT: 15.9 % (ref 11.5–14.5)
HCT VFR BLD AUTO: 30.4 % (ref 35–47)
HGB BLD-MCNC: 9.8 G/DL (ref 11.5–16)
IMM GRANULOCYTES # BLD AUTO: 0.1 K/UL (ref 0–0.04)
IMM GRANULOCYTES NFR BLD AUTO: 1 % (ref 0–0.5)
LYMPHOCYTES # BLD: 2 K/UL (ref 0.8–3.5)
LYMPHOCYTES NFR BLD: 39 % (ref 12–49)
MCH RBC QN AUTO: 25.1 PG (ref 26–34)
MCHC RBC AUTO-ENTMCNC: 32.2 G/DL (ref 30–36.5)
MCV RBC AUTO: 77.9 FL (ref 80–99)
MONOCYTES # BLD: 0.6 K/UL (ref 0–1)
MONOCYTES NFR BLD: 12 % (ref 5–13)
NEUTS SEG # BLD: 1.9 K/UL (ref 1.8–8)
NEUTS SEG NFR BLD: 38 % (ref 32–75)
NRBC # BLD: 0 K/UL (ref 0–0.01)
NRBC BLD-RTO: 0 PER 100 WBC
PLATELET # BLD AUTO: 291 K/UL (ref 150–400)
PMV BLD AUTO: 10.4 FL (ref 8.9–12.9)
RBC # BLD AUTO: 3.9 M/UL (ref 3.8–5.2)
SARS-COV-2, COV2: NORMAL
SOURCE, COVRS: NORMAL
WBC # BLD AUTO: 4.9 K/UL (ref 3.6–11)

## 2021-01-28 PROCEDURE — 36415 COLL VENOUS BLD VENIPUNCTURE: CPT

## 2021-01-28 PROCEDURE — 97116 GAIT TRAINING THERAPY: CPT

## 2021-01-28 PROCEDURE — 96372 THER/PROPH/DIAG INJ SC/IM: CPT

## 2021-01-28 PROCEDURE — 97530 THERAPEUTIC ACTIVITIES: CPT

## 2021-01-28 PROCEDURE — 94760 N-INVAS EAR/PLS OXIMETRY 1: CPT

## 2021-01-28 PROCEDURE — 74011250636 HC RX REV CODE- 250/636: Performed by: HOSPITALIST

## 2021-01-28 PROCEDURE — 97165 OT EVAL LOW COMPLEX 30 MIN: CPT

## 2021-01-28 PROCEDURE — 99218 HC RM OBSERVATION: CPT

## 2021-01-28 PROCEDURE — 87635 SARS-COV-2 COVID-19 AMP PRB: CPT

## 2021-01-28 PROCEDURE — 97535 SELF CARE MNGMENT TRAINING: CPT

## 2021-01-28 PROCEDURE — 74011250637 HC RX REV CODE- 250/637: Performed by: HOSPITALIST

## 2021-01-28 PROCEDURE — 85025 COMPLETE CBC W/AUTO DIFF WBC: CPT

## 2021-01-28 RX ORDER — PANTOPRAZOLE SODIUM 40 MG/1
40 TABLET, DELAYED RELEASE ORAL
Status: DISCONTINUED | OUTPATIENT
Start: 2021-01-29 | End: 2021-02-02 | Stop reason: HOSPADM

## 2021-01-28 RX ADMIN — Medication 10 ML: at 21:15

## 2021-01-28 RX ADMIN — ENOXAPARIN SODIUM 30 MG: 30 INJECTION SUBCUTANEOUS at 09:55

## 2021-01-28 RX ADMIN — Medication 10 ML: at 14:17

## 2021-01-28 RX ADMIN — Medication 2000 UNITS: at 09:55

## 2021-01-28 RX ADMIN — Medication 10 ML: at 05:54

## 2021-01-28 RX ADMIN — AMLODIPINE BESYLATE 5 MG: 5 TABLET ORAL at 09:56

## 2021-01-28 NOTE — PROGRESS NOTES
End of Shift Note    Bedside shift change report given to Amado Burrell RN (oncoming nurse) by Nicholas Smith RN (offgoing nurse). Report included the following information SBAR    Shift worked:  1900-0730     Shift summary and any significant changes:     uneventful shift pt has no complaints of pain. Concerns for physician to address:  None     Zone phone for oncoming shift:   [phone]       Activity:  Activity Level: Up with Assistance  Number times ambulated in hallways past shift: 0  Number of times OOB to chair past shift: 0    Cardiac:   Cardiac Monitoring: No           Access:   Current line(s): PIV     Genitourinary:   Urinary status: external catheter    Respiratory:   O2 Device: Room air  Chronic home O2 use?: NO  Incentive spirometer at bedside: NO     GI:  Last Bowel Movement Date: 01/25/21  Current diet:  DIET ONE TIME MESSAGE  DIET ONE TIME MESSAGE  DIET ONE TIME MESSAGE  DIET REGULAR  DIET ONE TIME MESSAGE  Passing flatus: YES  Tolerating current diet: YES  % Diet Eaten: 75 %(Lunch)    Pain Management:   Patient states pain is manageable on current regimen: YES    Skin:  Cordell Score: 19  Interventions: PT/OT consult and internal/external urinary devices    Patient Safety:  Fall Score:  Total Score: 4  Interventions: bed/chair alarm and gripper socks  High Fall Risk: Yes    Length of Stay:  Expected LOS: 2d 14h  Actual LOS: [address] RN

## 2021-01-28 NOTE — PROGRESS NOTES
Bedside and Verbal shift change report given to [facility] (oncoming nurse) by Chicho Vyas (offgoing nurse). Report included the following information SBAR, Kardex, Intake/Output, MAR and Recent Results.

## 2021-01-28 NOTE — PROGRESS NOTES
Problem: Mobility Impaired (Adult and Pediatric)  Goal: *Acute Goals and Plan of Care (Insert Text)  Description: FUNCTIONAL STATUS PRIOR TO ADMISSION: Pt lives in a one story alone. She had just returned home from [facility] and had fall necessitating return to ER. She states that she has a caregiver 5hrs 3x/week that assists with IADLs and showers. She states she used a rollator. HOME SUPPORT PRIOR TO ADMISSION: The patient lived alone with limited support. Physical Therapy Goals  Initiated 1/27/2021  1. Patient will move from supine to sit and sit to supine , scoot up and down, and roll side to side in bed with independence within 7 day(s). 2.  Patient will transfer from bed to chair and chair to bed with supervision/set-up using the least restrictive device within 7 day(s). 3.  Patient will perform sit to stand with supervision/set-up within 7 day(s). 4.  Patient will ambulate with supervision/set-up for 100 feet with the least restrictive device within 7 day(s). 5.  Patient will ascend/descend 2 stairs with handrail(s) with minimal assistance/contact guard assist within 7 day(s). Outcome: Progressing Towards Goal   PHYSICAL THERAPY TREATMENT  Patient: Anne Barraza (89 y.o. 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 XXXA]  Hip pain [M25.559]  Physical deconditioning [R53.81] <principal problem not specified>       Precautions: fall   Chart, physical therapy assessment, plan of care and goals were reviewed. ASSESSMENT  Patient continues with skilled PT services and is progressing towards goals. Pt's mobility improved today, basically min A of 1 but pt very othostatic after activity. Attained BP in supine and sitting, pt without sxs and BP increased with sitting. Attempted to check BP in standing but pt very insistant upon walking to toilet stating she felt the need to go immediately. Helped pt to BR with RW with CGA.  Pt without sxs and toileted urinating and having bowel movement but noted not to be straining. Pt performed hygiene. Pt still without sxs. Returned to bedside chair and pt then stated she felt very dizzy after sitting down. BP taken and noted to be 81/39. Chair tilted back and LEs elevated. Sxs improved with BP back to 119/57. Pt returned to bed with standing transfer after which BP 98/59 in supine. After 5 min pressure up to 122/64. (see flow sheets for all details). All reported to nursing. Current Level of Function Impacting Discharge (mobility/balance): Min A for bed mobility; min A sit to stand to RW and min A of 1 with RW to turn to chair. Safety and technique cues needed. Amb with RW with CGA/min A of 1. Other factors to consider for discharge: Pt was apparently alone at least part of the time. Will need 24 hr assist/supervision for completion of amb/mobility and for safety. Pt still with significant orthostatic issues         PLAN :  Patient continues to benefit from skilled intervention to address the above impairments. Continue treatment per established plan of care. to address goals. Recommendation for discharge: (in order for the patient to meet his/her long term goals)  Therapy up to 5 days/week in SNF setting; pt states she is unsure about wanting to return to rehab; if she returns home would need 24 hr assist and supervision and HHPT/OT    This discharge recommendation:  A follow-up discussion with the attending provider and/or case management is planned    IF patient discharges home will need the following DME: bedside commode, shower chair, and rolling walker       SUBJECTIVE:   Patient stated I am doing better today, maybe I will be even better tomorrow.     OBJECTIVE DATA SUMMARY:   Critical Behavior:  Neurologic State: Alert, Appropriate for age  Orientation Level: Appropriate for age, Oriented X4  Cognition: Appropriate decision making, Follows commands     Functional Mobility Training:  Bed Mobility:  Rolling: Minimum assistance  Supine to Sit: Minimum assistance  Sit to Supine: Minimum assistance  Scooting: Minimum assistance        Transfers:  Sit to Stand: Minimum assistance;Assist x1  Stand to Sit: Minimum assistance;Assist x1        Bed to Chair: Minimum assistance;Assist x1;Other (comment)(RW)                    Balance:  Sitting: Intact  Standing: Impaired  Standing - Static: Fair;Constant support; Other (comment)(RW)  Standing - Dynamic : Constant support;Fair; Other (comment)(RW)  Ambulation/Gait Training:  Distance (ft): 12 Feet (ft)(x2)  Assistive Device: Walker, rolling;Gait belt  Ambulation - Level of Assistance: Contact guard assistance;Minimal assistance;Assist x1        Gait Abnormalities: Decreased step clearance;Trunk sway increased; Other(flexed posture with increased proximity from walker)        Base of Support: Widened     Speed/Roseline: Pace decreased (<100 feet/min); Slow  Step Length: Right shortened;Left shortened              Pain Rating:  No c/o pain    Activity Tolerance:   Fair, see above narrative and doc flow for full vitals    After treatment patient left in no apparent distress:   Supine in bed, Call bell within reach, Caregiver / family present, and Side rails x 3    COMMUNICATION/COLLABORATION:   The patients plan of care was discussed with: Occupational therapist and Registered nurse.      Orestes Ruffin, PT   Time Calculation: 43 mins

## 2021-01-28 NOTE — PROGRESS NOTES
Bedside and Verbal shift change report given to Denae Rosario RN (oncoming nurse) by Malu Boogie (offgoing nurse). Report given with SBAR, Kardex, Intake/Output, MAR and Recent Results.

## 2021-01-28 NOTE — CONSULTS
GI CONSULTATION NOTE  Zackary Kennedy NP  [phone] NP in-hospital cell phone M-F until 4:30  After 5pm or on weekends, please call  for physician on call    NAME: Andreia Wayne   :  1934   MRN:  [id number]   Attending:  Dr. Therese Fischer  Primary GI:  Dr. Brittany Malone   Date/Time:  2021 3:21 PM    Assessment:   Anemia   · Patient denies any upper or lower GI complaints   · Denies hematochezia/melena   · Never had EGD/colonosocpy in the past   · Hgb 9.8; down from 13.8 on 2021  · Iron 38, TIBC 232, % saturations 16    Generalized weakness  GLF at home   · Treatment per primary team     Plan:   · Possible EGD tomorrow, pending COVID results. Patient was COVID + less than 1 month ago. · Monitor for s/s of bleeding; goal for hgb >7.0. Transfuse as clinically indicated   · FOBT pending  · Start pantoprazole 40 mg PO daily   · Symptomatic care per primary team  Plan discussed with Dr. Myla Fuentes:     HISTORY OF PRESENT ILLNESS:     Andreia Wayne is an 80 y.o.  female who we are asked to see for complaint of anemia. Patient mild drop in Hgb since early in January. Patient denies any GI complaints. Denies any hematochezia/melena. No hematemesis. Patient states that she has never had and EGD or colonoscopy in the past.    Patient was COVID + on 2020. Will consider EGD in the AM pending rapid COVID results. Past Medical History:   Diagnosis Date    CAD (coronary artery disease)     Hypertension       History reviewed. No pertinent surgical history. Social History     Tobacco Use    Smoking status: Never Smoker    Smokeless tobacco: Never Used   Substance Use Topics    Alcohol use: No      History reviewed. No pertinent family history. Allergies   Allergen Reactions    Codeine Hives    Pcn [Penicillins] Hives    Sulfa (Sulfonamide Antibiotics) Hives      Prior to Admission medications    Medication Sig Start Date End Date Taking? Authorizing Provider   amLODIPine (NORVASC) 5 mg tablet Take 1 Tab by mouth daily. 1/7/21  Yes Danisha Wang MD   cholecalciferol (VITAMIN D3) (1000 Units /25 mcg) tablet Take 2 Tabs by mouth daily. 1/7/21  Yes Danisha Wang MD       Patient Active Problem List   Diagnosis Code    Generalized weakness R53.1    Orthostatic hypotension I95.1    Hypertension I10    CAD (coronary artery disease) I25.10    Hip pain M25.559    Fall W19. [place]    General weakness R53.1    Orthostatic hypertension I10    Physical deconditioning R53.81       REVIEW OF SYSTEMS:    Constitutional: negative fever, negative chills, negative weight loss  Eyes:   negative visual changes  ENT:   negative sore throat, tongue or lip swelling   Respiratory:  negative cough, negative dyspnea  Cards:  negative for chest pain, palpitations, lower extremity edema  GI:   See HPI  :  negative for frequency, dysuria  Integument:  negative for rash and pruritus  Heme:  negative for easy bruising and gum/nose bleeding  Musculoskel: negative for myalgias,  back pain and muscle weakness  Neuro: negative for headaches, dizziness, vertigo  Psych:  negative for feelings of anxiety, depression     Objective:   VITALS:    Visit Vitals  BP (!) 144/65 (BP Patient Position: Sitting)   Pulse 89   Temp 98.3 °F (36.8 °C)   Resp 18   Ht 5' 4\" (1.626 m)   Wt 65.8 kg (145 lb 1 oz)   SpO2 97%   BMI 24.90 kg/m²       PHYSICAL EXAM:   General:           Pleasant elderly AA female. NAD    Head:               Normocephalic, without obvious abnormality, atraumatic. Eyes:               Conjunctivae clear and pale, anicteric sclerae. Pupils are equal  Nose:               Nares normal. No drainage or sinus tenderness. Throat:             Lips, mucosa, and tongue normal.  No Thrush  Neck:               Supple, symmetrical,  no adenopathy, thyroid: non tender  Back:               Symmetric,  No CVA tenderness. Lungs:             CTA bilaterally.   No wheezing/rhonchi/rales. Chest wall:      No tenderness or deformity. No Accessory muscle use. Heart:              Regular rate and rhythm,  no murmur, rub or gallop. Abdomen:        Soft, non-tender. Not distended. Bowel sounds normal. No masses  Extremities:     Atraumatic, No cyanosis. No edema. No clubbing  Skin:                Texture, turgor normal. No rashes/lesions/jaundice  Psych:             Good insight. Not depressed. Not anxious or agitated. Neurologic:      EOMs intact. No facial asymmetry. No aphasia or slurred speech. Normal                        strength, A/O X 3. LAB DATA REVIEWED:    Recent Results (from the past 24 hour(s))   CBC WITH AUTOMATED DIFF    Collection Time: 01/28/21  4:21 AM   Result Value Ref Range    WBC 4.9 3.6 - 11.0 K/uL    RBC 3.90 3.80 - 5.20 M/uL    HGB 9.8 (L) 11.5 - 16.0 g/dL    HCT 30.4 (L) 35.0 - 47.0 %    MCV 77.9 (L) 80.0 - 99.0 FL    MCH 25.1 (L) 26.0 - 34.0 PG    MCHC 32.2 30.0 - 36.5 g/dL    RDW 15.9 (H) 11.5 - 14.5 %    PLATELET 160 529 - 433 K/uL    MPV 10.4 8.9 - 12.9 FL    NRBC 0.0 0  WBC    ABSOLUTE NRBC 0.00 0.00 - 0.01 K/uL    NEUTROPHILS 38 32 - 75 %    LYMPHOCYTES 39 12 - 49 %    MONOCYTES 12 5 - 13 %    EOSINOPHILS 9 (H) 0 - 7 %    BASOPHILS 1 0 - 1 %    IMMATURE GRANULOCYTES 1 (H) 0.0 - 0.5 %    ABS. NEUTROPHILS 1.9 1.8 - 8.0 K/UL    ABS. LYMPHOCYTES 2.0 0.8 - 3.5 K/UL    ABS. MONOCYTES 0.6 0.0 - 1.0 K/UL    ABS. EOSINOPHILS 0.4 0.0 - 0.4 K/UL    ABS. BASOPHILS 0.1 0.0 - 0.1 K/UL    ABS. IMM.  GRANS. 0.1 (H) 0.00 - 0.04 K/UL    DF AUTOMATED         IMAGING RESULTS:  I have personally reviewed the imaging reports      Total time spent with patient: 50 minutes ________________________________________________________________________  Care Plan discussed with:  Patient x   Family     RN x              Consultant:  Hospitalist      CT 1/28/2021:  ________________________________________________________________________    ___________________________________________________  Consulting Provider: Marielena Angeles NP      1/28/2021  3:21 PM

## 2021-01-28 NOTE — PROGRESS NOTES
Hospitalist Progress Note    NAME: Ying Barahona   :  1934   MRN:  [id number]       Assessment / Plan:  Generalized weakness  Hx of Symptomatic orthostatic hypotension  S/P GLM at home  CAD  HTN  Anemia   Mild dropped in H/H  Check iron studies. FOBT. No evidence of Hemolysis or active bleeding   check orthostatics   PT/OT/CM   Likely will need Rehab   C/w norvasc    Compression stockings  No evidence of infection  PE is non focal  CXR negative  Pelvis xray is negative  Resume Home meds as per Med rec.       Code Status: Full  Surrogate Decision Maker: Son  DVT Prophylaxis: Lovenox  GI Prophylaxis: not indicated  Baseline: Dischareg from Rehab yesterday           Subjective:     Chief Complaint / Reason for Physician Visit  FU anbulatory dysfunction . No acute issues awaiting placement  Discussed with RN events overnight. Review of Systems:  Symptom Y/N Comments  Symptom Y/N Comments   Fever/Chills n   Chest Pain n    Poor Appetite    Edema     Cough    Abdominal Pain n    Sputum    Joint Pain     SOB/AGUILERA n   Pruritis/Rash     Nausea/vomit    Tolerating PT/OT     Diarrhea    Tolerating Diet     Constipation    Other       Could NOT obtain due to:      Objective:     VITALS:   Last 24hrs VS reviewed since prior progress note.  Most recent are:  Patient Vitals for the past 24 hrs:   Temp Pulse Resp BP SpO2   21 1919 98.4 °F (36.9 °C) 65 16  98 %   21 1521 98 °F (36.7 °C) 67 16 (!) 127/57 97 %   21 0734 98.3 °F (36.8 °C) 63 16 (!) 115/58 97 %   21 0308 98.5 °F (36.9 °C) 69 18 125/60 94 %   21 2306 98.3 °F (36.8 °C) 73 18 (!) 113/58 97 %   21 1926 98.4 °F (36.9 °C) 77 18 117/60 98 %       Intake/Output Summary (Last 24 hours) at 2021  Last data filed at 2021 1330  Gross per 24 hour   Intake 960 ml   Output 1100 ml   Net -140 ml        I had a face to face encounter and independently examined this patient on 2021, as outlined below:  PHYSICAL EXAM:  General: WD, WN. Alert, cooperative, no acute distress    EENT:  EOMI. Anicteric sclerae. MMM  Resp:  CTA bilaterally, no wheezing or rales. No accessory muscle use  CV:  Regular  rhythm,  No edema  GI:  Soft, Non distended, Non tender. +Bowel sounds  Neurologic:  Alert and oriented X 3, normal speech,   Psych:   Good insight. Not anxious nor agitated  Skin:  No rashes. No jaundice    Reviewed most current lab test results and cultures  YES  Reviewed most current radiology test results   YES  Review and summation of old records today    NO  Reviewed patient's current orders and MAR    YES  PMH/SH reviewed - no change compared to H&P  ________________________________________________________________________  Care Plan discussed with:    Comments   Patient x    Family      RN x    Care Manager     Consultant                        Multidiciplinary team rounds were held today with , nursing, pharmacist and clinical coordinator. Patient's plan of care was discussed; medications were reviewed and discharge planning was addressed. ________________________________________________________________________  Total NON critical care TIME35  Minutes    Total CRITICAL CARE TIME Spent:   Minutes non procedure based      Comments   >50% of visit spent in counseling and coordination of care     ________________________________________________________________________  Maria De Jesus Tang MD     Procedures: see electronic medical records for all procedures/Xrays and details which were not copied into this note but were reviewed prior to creation of Plan. LABS:  I reviewed today's most current labs and imaging studies.   Pertinent labs include:  Recent Labs     01/27/21  0309 01/25/21 2111   WBC 4.2 6.4   HGB 10.0* 10.7*   HCT 30.8* 32.8*    247     Recent Labs     01/27/21 0309 01/25/21 2111    142   K 4.1 4.3   * 110*   CO2 25 26   GLU 97 105*   BUN 20 21*   CREA 1.11* 1.29*   CA 9.2 9.3   MG 1.9  --        Signed: Martir Carranza MD

## 2021-01-28 NOTE — PROGRESS NOTES
Problem: Self Care Deficits Care Plan (Adult)  Goal: *Acute Goals and Plan of Care (Insert Text)  Description:   FUNCTIONAL STATUS PRIOR TO ADMISSION: The patient was living alone in a one story home, using RW for functional mobility. Recently left [facility], however back to hospital after fall at home. Per patient, has had caregivers coming 5x/week and helping with bathing and home IADLs (per patient, very limited support actually provided). HOME SUPPORT: The patient lived alone with caregivers M-F for 5 hours periods each day. Occupational Therapy Goals  Initiated 1/28/2021  1. Patient will perform grooming with supervision/set-up using RW prn within 7 day(s). 2.  Patient will perform upper body dressing and lower body dressing with supervision/set-up using RW prn within 7 day(s). 3.  Patient will perform toilet transfers with supervision/set-up using RW prn within 7 day(s). 4.  Patient will perform all aspects of toileting with supervision/set-up using RW prn within 7 day(s). 5.  Patient will utilize fall prevention techniques during functional activities with minimal verbal cues within 7 day(s). Outcome: Progressing Towards Goal   OCCUPATIONAL THERAPY EVALUATION  Patient: Donald Duke (4 y.o. female)  Date: 1/28/2021  Primary Diagnosis: General weakness [R53.1]  Orthostatic hypertension [I10]  Fall [W19. XXXA]  Hip pain [M25.559]  Physical deconditioning [R53.81]        Precautions:        ASSESSMENT  Based on the objective data described below, the patient presents with generalized weakness, decreased activity tolerance, impaired functional mobility and balance, and increased fall risk s/p admission after fall at home. Patient received supine in bed, agreeable to session. Patient able to come to EOB and stand with overall min assist and UE support of therapist (no RW present in patient room). Upon standing, patient reporting need for toileting.  Completed stand-pivot to [facility] where patient required mod-max assist for management of protective undergarment, however was able to complete hygiene with set-up. Patient with no symptoms of orthostatic hypotension and VSS throughout session. Patient generally unsteady and with decreased safety awareness - concern for patient safety if returns home with current set-up as she is a high risk for falls with any functional mobility or dynamic ADL participation. Recommend discharge to SNF to maximize functional gains; if unable, would require  and 24/7 hands-on assistance for safety. Current Level of Function Impacting Discharge (ADLs/self-care): min A functional transfers + ADLs    Functional Outcome Measure: The patient scored 45/100 on the Barthel Index. Other factors to consider for discharge: fall risk     Patient will benefit from skilled therapy intervention to address the above noted impairments. PLAN :  Recommendations and Planned Interventions: self care training, functional mobility training, therapeutic exercise, balance training, therapeutic activities, endurance activities, patient education, and home safety training    Frequency/Duration: Patient will be followed by occupational therapy 4 times a week to address goals. Recommendation for discharge: (in order for the patient to meet his/her long term goals)  Therapy up to 5 days/week in SNF setting; If unable, would require [facility] as well as 24/7 hands-on assistance for safety with functional mobility and dynamic ADL participation. This discharge recommendation:  Has not yet been discussed the attending provider and/or case management    IF patient discharges home will need the following DME: TBD       SUBJECTIVE:   Patient stated I think my bowels are going to move again.     OBJECTIVE DATA SUMMARY:   HISTORY:   Past Medical History:   Diagnosis Date    CAD (coronary artery disease)     Hypertension    History reviewed. No pertinent surgical history.     Expanded or extensive additional review of patient history:     Home Situation  Home Environment: Private residence  # Steps to Enter: 2  Rails to Enter: No  One/Two Story Residence: One story  Living Alone: Yes  Support Systems: Child(jorden), Family member(s), Friends \ neighbors, Home care staff  Current DME Used/Available at Home: Shower chair, Walker, rollator, Commode, bedside, Cane, straight  Tub or Shower Type: Tub/Shower combination    Hand dominance: Right    EXAMINATION OF PERFORMANCE DEFICITS:  Cognitive/Behavioral Status:  Neurologic State: Alert; Appropriate for age  Orientation Level: Oriented X4  Cognition: Follows commands; Appropriate for age attention/concentration  Perception: Appears intact  Perseveration: No perseveration noted  Safety/Judgement: Home safety; Fall prevention; Awareness of environment    Hearing: Auditory  Auditory Impairment: None    Vision/Perceptual:    Acuity: Within Defined Limits    Corrective Lenses: Glasses    Range of Motion:  AROM: Generally decreased, functional    Strength:  Strength: Generally decreased, functional    Coordination:  Coordination: Generally decreased, functional       Tone & Sensation:  Tone: Normal    Balance:  Sitting: Intact  Standing: Impaired  Standing - Static: Fair;Constant support; Other (comment)(RW)  Standing - Dynamic : Constant support;Fair; Other (comment)(RW)    Functional Mobility and Transfers for ADLs:  Bed Mobility:  Rolling: Minimum assistance  Supine to Sit: Minimum assistance; Additional time  Sit to Supine: Minimum assistance; Additional time  Scooting: Contact guard assistance    Transfers:  Sit to Stand: Minimum assistance; Additional time  Stand to Sit: Minimum assistance; Additional time  Bed to Chair: Minimum assistance; Additional time  Toilet Transfer : Minimum assistance; Additional time    ADL Assessment:  Feeding: Setup    Oral Facial Hygiene/Grooming: Setup    Upper Body Dressing: Setup    Lower Body Dressing: Minimum assistance    Toileting: Minimum assistance    ADL Intervention and task modifications:  Grooming  Grooming Assistance: Set-up  Washing Hands: Set-up(using wipes)    Lower Body Dressing Assistance  Dressing Assistance: Minimum assistance  Protective Undergarmet: Minimum assistance  Socks: Stand-by assistance  Leg Crossed Method Used: No  Position Performed: Bending forward method;Seated edge of bed;Standing  Cues: Verbal cues provided;Visual cues provided;Physical assistance    Toileting  Toileting Assistance: Maximum assistance(urgency)  Bladder Hygiene: Set-up  Bowel Hygiene: Set-up  Clothing Management: Maximum assistance  Cues: Verbal cues provided;Physical assistance for pants up;Physical assistance for pants down    Cognitive Retraining  Safety/Judgement: Home safety; Fall prevention; Awareness of environment    Functional Measure:  Barthel Index:    Bathin  Bladder: 5  Bowels: 5  Groomin  Dressin  Feeding: 10  Mobility: 0  Stairs: 0  Toilet Use: 5  Transfer (Bed to Chair and Back): 10  Total: 45/100        The Barthel ADL Index: Guidelines  1. The index should be used as a record of what a patient does, not as a record of what a patient could do. 2. The main aim is to establish degree of independence from any help, physical or verbal, however minor and for whatever reason. 3. The need for supervision renders the patient not independent. 4. A patient's performance should be established using the best available evidence. Asking the patient, friends/relatives and nurses are the usual sources, but direct observation and common sense are also important. However direct testing is not needed. 5. Usually the patient's performance over the preceding 24-48 hours is important, but occasionally longer periods will be relevant. 6. Middle categories imply that the patient supplies over 50 per cent of the effort. 7. Use of aids to be independent is allowed. Elbert Ramos. Barthel, D.W. (7041). Functional evaluation: the Barthel Index.  [facility] Med J ([id number]. GISEL DunawayF, Devon Hadley., Kelsi Hair., Donna Quigley, [address] (1999). Measuring the change indisability after inpatient rehabilitation; comparison of the responsiveness of the Barthel Index and Functional [place] Measure. Journal of Neurology, Neurosurgery, and Psychiatry, 66(4), 606-559. REJI Hua, KONRAD Rajan, & Rashmi Morales M.A. (2004.) Assessment of post-stroke quality of life in cost-effectiveness studies: The usefulness of the Barthel Index and the EuroQoL-5D. Quality of Life Research, 15, 854-35     Occupational Therapy Evaluation Charge Determination   History Examination Decision-Making   LOW Complexity : Brief history review  LOW Complexity : 1-3 performance deficits relating to physical, cognitive , or psychosocial skils that result in activity limitations and / or participation restrictions  LOW Complexity : No comorbidities that affect functional and no verbal or physical assistance needed to complete eval tasks       Based on the above components, the patient evaluation is determined to be of the following complexity level: LOW   Pain Rating:  No reports. Activity Tolerance:   Fair and requires rest breaks    After treatment patient left in no apparent distress:    Supine in bed, Call bell within reach, and Side rails x 3    COMMUNICATION/EDUCATION:   The patients plan of care was discussed with: Physical therapist and Registered nurse. Home safety education was provided and the patient/caregiver indicated understanding., Patient/family have participated as able in goal setting and plan of care. , and Patient/family agree to work toward stated goals and plan of care.     Thank you for this referral.  Joanne Hamilton, OT  Time Calculation: 37 mins

## 2021-01-28 NOTE — PROGRESS NOTES
Physical Therapy    Pt's mobility improved today but pt very othostatic after activity. Attained BP in supine and sitting, pt without sxs and BP increased with sitting. Attempted to check BP in standing but pt very insistant upon walking to toilet stating she felt the need to go immediately. Helped pt to BR with RW with CGA. Pt without sxs and toileted urinating and having bowel movement but noted not to be straining. Pt still without sxs. Returned to bedside chair and pt then stated she felt very dizzy. BP taken and noted to be 81/39. Chair tilted back and LEs elevated. Sxs improved with BP back to 119/57. Pt returned to bed with standing transfer after which BP 98/59 in supine. After 5 min pressure up to 122/64. (see flow sheets for all details). All reported to nursing. Full note to follow.     Danielle Dillon, PT

## 2021-01-29 ENCOUNTER — ANESTHESIA EVENT (OUTPATIENT)
Dept: ENDOSCOPY | Age: 86
DRG: 392 | End: 2021-01-29
Payer: MEDICARE

## 2021-01-29 ENCOUNTER — ANESTHESIA (OUTPATIENT)
Dept: ENDOSCOPY | Age: 86
DRG: 392 | End: 2021-01-29
Payer: MEDICARE

## 2021-01-29 LAB
ANION GAP SERPL CALC-SCNC: 4 MMOL/L (ref 5–15)
BASOPHILS # BLD: 0.1 K/UL (ref 0–0.1)
BASOPHILS NFR BLD: 1 % (ref 0–1)
BUN SERPL-MCNC: 22 MG/DL (ref 6–20)
BUN/CREAT SERPL: 18 (ref 12–20)
CALCIUM SERPL-MCNC: 9.8 MG/DL (ref 8.5–10.1)
CHLORIDE SERPL-SCNC: 106 MMOL/L (ref 97–108)
CO2 SERPL-SCNC: 28 MMOL/L (ref 21–32)
CREAT SERPL-MCNC: 1.21 MG/DL (ref 0.55–1.02)
DIFFERENTIAL METHOD BLD: ABNORMAL
EOSINOPHIL # BLD: 0.4 K/UL (ref 0–0.4)
EOSINOPHIL NFR BLD: 7 % (ref 0–7)
ERYTHROCYTE [DISTWIDTH] IN BLOOD BY AUTOMATED COUNT: 15.9 % (ref 11.5–14.5)
GLUCOSE SERPL-MCNC: 93 MG/DL (ref 65–100)
HCT VFR BLD AUTO: 31.5 % (ref 35–47)
HGB BLD-MCNC: 10.1 G/DL (ref 11.5–16)
IMM GRANULOCYTES # BLD AUTO: 0.1 K/UL (ref 0–0.04)
IMM GRANULOCYTES NFR BLD AUTO: 1 % (ref 0–0.5)
LYMPHOCYTES # BLD: 2.1 K/UL (ref 0.8–3.5)
LYMPHOCYTES NFR BLD: 39 % (ref 12–49)
MAGNESIUM SERPL-MCNC: 1.7 MG/DL (ref 1.6–2.4)
MCH RBC QN AUTO: 25.2 PG (ref 26–34)
MCHC RBC AUTO-ENTMCNC: 32.1 G/DL (ref 30–36.5)
MCV RBC AUTO: 78.6 FL (ref 80–99)
MONOCYTES # BLD: 0.7 K/UL (ref 0–1)
MONOCYTES NFR BLD: 13 % (ref 5–13)
NEUTS SEG # BLD: 2.1 K/UL (ref 1.8–8)
NEUTS SEG NFR BLD: 39 % (ref 32–75)
NRBC # BLD: 0 K/UL (ref 0–0.01)
NRBC BLD-RTO: 0 PER 100 WBC
PHOSPHATE SERPL-MCNC: 4.7 MG/DL (ref 2.6–4.7)
PLATELET # BLD AUTO: 305 K/UL (ref 150–400)
PMV BLD AUTO: 10.6 FL (ref 8.9–12.9)
POTASSIUM SERPL-SCNC: 4.2 MMOL/L (ref 3.5–5.1)
RBC # BLD AUTO: 4.01 M/UL (ref 3.8–5.2)
SODIUM SERPL-SCNC: 138 MMOL/L (ref 136–145)
WBC # BLD AUTO: 5.5 K/UL (ref 3.6–11)

## 2021-01-29 PROCEDURE — 99218 HC RM OBSERVATION: CPT

## 2021-01-29 PROCEDURE — 76040000019: Performed by: INTERNAL MEDICINE

## 2021-01-29 PROCEDURE — 94760 N-INVAS EAR/PLS OXIMETRY 1: CPT

## 2021-01-29 PROCEDURE — 74011250636 HC RX REV CODE- 250/636: Performed by: STUDENT IN AN ORGANIZED HEALTH CARE EDUCATION/TRAINING PROGRAM

## 2021-01-29 PROCEDURE — 80048 BASIC METABOLIC PNL TOTAL CA: CPT

## 2021-01-29 PROCEDURE — 83735 ASSAY OF MAGNESIUM: CPT

## 2021-01-29 PROCEDURE — 76060000031 HC ANESTHESIA FIRST 0.5 HR: Performed by: INTERNAL MEDICINE

## 2021-01-29 PROCEDURE — 85025 COMPLETE CBC W/AUTO DIFF WBC: CPT

## 2021-01-29 PROCEDURE — 74011250636 HC RX REV CODE- 250/636: Performed by: NURSE PRACTITIONER

## 2021-01-29 PROCEDURE — 74011250637 HC RX REV CODE- 250/637: Performed by: STUDENT IN AN ORGANIZED HEALTH CARE EDUCATION/TRAINING PROGRAM

## 2021-01-29 PROCEDURE — 74011000250 HC RX REV CODE- 250: Performed by: ANESTHESIOLOGY

## 2021-01-29 PROCEDURE — 88305 TISSUE EXAM BY PATHOLOGIST: CPT

## 2021-01-29 PROCEDURE — 84100 ASSAY OF PHOSPHORUS: CPT

## 2021-01-29 PROCEDURE — 0DB68ZX EXCISION OF STOMACH, VIA NATURAL OR ARTIFICIAL OPENING ENDOSCOPIC, DIAGNOSTIC: ICD-10-PCS | Performed by: INTERNAL MEDICINE

## 2021-01-29 PROCEDURE — 96372 THER/PROPH/DIAG INJ SC/IM: CPT

## 2021-01-29 PROCEDURE — 2709999900 HC NON-CHARGEABLE SUPPLY: Performed by: INTERNAL MEDICINE

## 2021-01-29 PROCEDURE — 74011250636 HC RX REV CODE- 250/636: Performed by: ANESTHESIOLOGY

## 2021-01-29 PROCEDURE — 96375 TX/PRO/DX INJ NEW DRUG ADDON: CPT

## 2021-01-29 PROCEDURE — 74011250637 HC RX REV CODE- 250/637: Performed by: NURSE PRACTITIONER

## 2021-01-29 PROCEDURE — 74011250637 HC RX REV CODE- 250/637: Performed by: HOSPITALIST

## 2021-01-29 PROCEDURE — 36415 COLL VENOUS BLD VENIPUNCTURE: CPT

## 2021-01-29 PROCEDURE — 77030019988 HC FCPS ENDOSC DISP BSC -B: Performed by: INTERNAL MEDICINE

## 2021-01-29 PROCEDURE — 77030039825 HC MSK NSL PAP SUPERNO2VA VYRM -B: Performed by: ANESTHESIOLOGY

## 2021-01-29 RX ORDER — NALOXONE HYDROCHLORIDE 0.4 MG/ML
0.4 INJECTION, SOLUTION INTRAMUSCULAR; INTRAVENOUS; SUBCUTANEOUS
Status: DISCONTINUED | OUTPATIENT
Start: 2021-01-29 | End: 2021-01-29 | Stop reason: HOSPADM

## 2021-01-29 RX ORDER — SODIUM CHLORIDE 0.9 % (FLUSH) 0.9 %
5-40 SYRINGE (ML) INJECTION AS NEEDED
Status: DISCONTINUED | OUTPATIENT
Start: 2021-01-29 | End: 2021-02-02 | Stop reason: HOSPADM

## 2021-01-29 RX ORDER — PROPOFOL 10 MG/ML
INJECTION, EMULSION INTRAVENOUS AS NEEDED
Status: DISCONTINUED | OUTPATIENT
Start: 2021-01-29 | End: 2021-01-29 | Stop reason: HOSPADM

## 2021-01-29 RX ORDER — LIDOCAINE HYDROCHLORIDE 20 MG/ML
INJECTION, SOLUTION EPIDURAL; INFILTRATION; INTRACAUDAL; PERINEURAL AS NEEDED
Status: DISCONTINUED | OUTPATIENT
Start: 2021-01-29 | End: 2021-01-29 | Stop reason: HOSPADM

## 2021-01-29 RX ORDER — FLUMAZENIL 0.1 MG/ML
0.2 INJECTION INTRAVENOUS
Status: DISCONTINUED | OUTPATIENT
Start: 2021-01-29 | End: 2021-01-29 | Stop reason: HOSPADM

## 2021-01-29 RX ORDER — NYSTATIN 100000 [USP'U]/G
POWDER TOPICAL 2 TIMES DAILY
Status: DISCONTINUED | OUTPATIENT
Start: 2021-01-29 | End: 2021-02-02 | Stop reason: HOSPADM

## 2021-01-29 RX ORDER — SODIUM CHLORIDE 9 MG/ML
25 INJECTION, SOLUTION INTRAVENOUS CONTINUOUS
Status: DISCONTINUED | OUTPATIENT
Start: 2021-01-29 | End: 2021-01-29 | Stop reason: HOSPADM

## 2021-01-29 RX ORDER — SODIUM CHLORIDE 9 MG/ML
25 INJECTION, SOLUTION INTRAVENOUS CONTINUOUS
Status: DISPENSED | OUTPATIENT
Start: 2021-01-29 | End: 2021-01-29

## 2021-01-29 RX ORDER — EPINEPHRINE 0.1 MG/ML
1 INJECTION INTRACARDIAC; INTRAVENOUS
Status: DISCONTINUED | OUTPATIENT
Start: 2021-01-29 | End: 2021-01-29 | Stop reason: HOSPADM

## 2021-01-29 RX ORDER — DEXTROMETHORPHAN/PSEUDOEPHED 2.5-7.5/.8
1.2 DROPS ORAL
Status: DISCONTINUED | OUTPATIENT
Start: 2021-01-29 | End: 2021-01-29 | Stop reason: HOSPADM

## 2021-01-29 RX ORDER — SODIUM CHLORIDE 0.9 % (FLUSH) 0.9 %
5-40 SYRINGE (ML) INJECTION EVERY 8 HOURS
Status: DISCONTINUED | OUTPATIENT
Start: 2021-01-29 | End: 2021-02-02 | Stop reason: HOSPADM

## 2021-01-29 RX ORDER — ATROPINE SULFATE 0.1 MG/ML
0.5 INJECTION INTRAVENOUS
Status: DISCONTINUED | OUTPATIENT
Start: 2021-01-29 | End: 2021-01-29 | Stop reason: HOSPADM

## 2021-01-29 RX ADMIN — Medication 10 ML: at 06:00

## 2021-01-29 RX ADMIN — AMLODIPINE BESYLATE 5 MG: 5 TABLET ORAL at 08:14

## 2021-01-29 RX ADMIN — Medication 2000 UNITS: at 08:14

## 2021-01-29 RX ADMIN — LIDOCAINE HYDROCHLORIDE 80 MG: 20 INJECTION, SOLUTION EPIDURAL; INFILTRATION; INTRACAUDAL; PERINEURAL at 10:29

## 2021-01-29 RX ADMIN — Medication 10 ML: at 22:18

## 2021-01-29 RX ADMIN — Medication 10 ML: at 15:42

## 2021-01-29 RX ADMIN — IRON SUCROSE 200 MG: 20 INJECTION, SOLUTION INTRAVENOUS at 00:35

## 2021-01-29 RX ADMIN — NYSTATIN: 100000 POWDER TOPICAL at 17:29

## 2021-01-29 RX ADMIN — ACETAMINOPHEN 650 MG: 325 TABLET ORAL at 08:17

## 2021-01-29 RX ADMIN — PANTOPRAZOLE SODIUM 40 MG: 40 TABLET, DELAYED RELEASE ORAL at 08:14

## 2021-01-29 RX ADMIN — SODIUM CHLORIDE 25 ML/HR: 9 INJECTION, SOLUTION INTRAVENOUS at 10:05

## 2021-01-29 RX ADMIN — PROPOFOL 60 MG: 10 INJECTION, EMULSION INTRAVENOUS at 10:40

## 2021-01-29 NOTE — PROCEDURES
NAME:  Vin Shanks   :   1934   MRN:   [id number]     Date/Time:  2021 10:36 AM    Esophagogastroduodenoscopy (EGD) Procedure Note    Procedure: Esophagogastroduodenoscopy with biopsy    Indication: RALEIGH  Pre-operative Diagnosis: see indication above  Post-operative Diagnosis: see findings below  :  Nishi Pearl MD  Referring Provider:   -Lucita Topete MD    Exam:  Airway: clear, no airway problems anticipated  Heart: RRR, without gallops or rubs  Lungs: clear bilaterally without wheezes, crackles, or rhonchi  Abdomen: soft, nontender, nondistended, bowel sounds present  Mental Status: awake, alert and oriented to person, place and time     Anethesia/Sedation:  MAC anesthesia Propofol  Procedure Details   After informed consent was obtained for the procedure, with all risks and benefits of procedure explained the patient was taken to the endoscopy suite and placed in the left lateral decubitus position. Following sequential administration of sedation as per above, the [id number] gastroscope was inserted into the mouth and advanced under direct vision to second portion of the duodenum. A careful inspection was made as the gastroscope was withdrawn, including a retroflexed view of the proximal stomach; findings and interventions are described below. Findings:   OROPHARYNX: Cords and pyriform recesses normal.   ESOPHAGUS: The esophagus is normal. The proximal, mid, and distal portions are normal. The Z-Line is intact. STOMACH: The fundus on antegrade and retroflex views is normal. The body, antrum, and pylorus have patchy erythema, and focal erosions with scant acid hematin present. DUODENUM: The bulb, second and third portions are normal.    Therapies:   biopsy of stomach cardia, body, antrum    Specimens: gastric    EBL:  None. Complications:   None; patient tolerated the procedure well.            Impression:  -- moderate gastritis, with erosions and scant acid hematin present; this could be the cause or at least contribution to her progressive iron deficiency anemia. Biopsied for h.pylori rule out. -- otherwise, normal.    Recommendations:  -- await pathology results  -- resume diet  -- agree with IV iron  -- follow GI output  -- consider outpatient vs inpatient colonoscopy if hgb continues to trend down or signs of GI bleeding. Would not start bowel preparation now.     Discharge disposition:  Home in the company of  when able to ambulate    Fermín Kelly MD

## 2021-01-29 NOTE — PROGRESS NOTES
Endoscope was pre-cleaned at the bedside immediately following procedure by Royal Blanco RN. For medications administered by anesthesia, see anesthesia chart.

## 2021-01-29 NOTE — PROGRESS NOTES
End of Shift Note    Bedside shift change report given to Tanya Jaime (oncoming nurse) by Ken Molina RN (offgoing nurse). Report included the following information SBAR and Recent Results    Shift worked:  day     Shift summary and any significant changes:     EGD today     Concerns for physician to address:  orthostasis     Zone phone for oncoming shift:   NA       Activity:  Activity Level: Up with Assistance  Number times ambulated in hallways past shift: 0  Number of times OOB to chair past shift: 0    Cardiac:   Cardiac Monitoring: No      Cardiac Rhythm: Normal sinus rhythm    Access:   Current line(s): PIV     Genitourinary:   Urinary status: voiding and external catheter    Respiratory:   O2 Device: Room air  Chronic home O2 use?: NO  Incentive spirometer at bedside: N/A     GI:  Last Bowel Movement Date: 01/28/21  Current diet:  DIET ONE TIME MESSAGE  DIET ONE TIME MESSAGE  DIET ONE TIME MESSAGE  DIET ONE TIME MESSAGE  DIET REGULAR  Passing flatus: YES  Tolerating current diet: YES  % Diet Eaten: 90 %    Pain Management:   Patient states pain is manageable on current regimen: YES    Skin:  Cordell Score: 18  Interventions: turn team, float heels, increase time out of bed, PT/OT consult and limit briefs    Patient Safety:  Fall Score:  Total Score: 4  Interventions: bed/chair alarm, assistive device (walker, cane, etc), gripper socks, pt to call before getting OOB and stay with me (per policy)  High Fall Risk: Yes    Length of Stay:  Expected LOS: 2d 14h  Actual LOS: Indiana Braden, RN

## 2021-01-29 NOTE — PROGRESS NOTES
Kristina Sandoval  1934  [id number]    Situation:  Verbal report received from: Solis Bender RN  Procedure: Procedure(s):  ESOPHAGOGASTRODUODENOSCOPY (EGD)  ESOPHAGOGASTRODUODENAL (EGD) BIOPSY    Background:    Preoperative diagnosis: Iron deficiency anemia due to chronic blood loss [D50.0]  Postoperative diagnosis: Gastritis     :  Dr. Yumiko Gutierrez  Assistant(s): Endoscopy RN-1: Rusty REDDING    Specimens:   ID Type Source Tests Collected by Time Destination   1 : Gastric Biopsy Preservative Gastric  Kavitha Rocha MD 1/29/2021 1037 Pathology     H. Pylori  no    Assessment:  Intra-procedure medications     Anesthesia gave intra-procedure sedation and medications, see anesthesia flow sheet yes    Intravenous fluids: NS@ KVO     Vital signs stable      Abdominal assessment: round and soft      Recommendation:  Discharge patient per MD order .   Return to floor

## 2021-01-29 NOTE — PROGRESS NOTES
End of Shift Note    Bedside shift change report given to Eliazar Hopson (oncoming nurse) by Anusha Diaz RN (offgoing nurse). Report included the following information SBAR, Kardex, ED Summary, Procedure Summary, Intake/Output, MAR, Accordion and Recent Results    Shift worked:  [id number]     Shift summary and any significant changes:     No acute issues overnight. VS stable, no complaints of pain. Pt remained NPO after MN for possible EGD today. IV iron given overnight. Pt tolerated purewick in place for incontinence issues. Concerns for physician to address:  n/a      Zone phone for oncoming shift:   [phone]       Activity:  Activity Level: Bed Rest  Number times ambulated in hallways past shift: 0  Number of times OOB to chair past shift: 0    Cardiac:   Cardiac Monitoring: No        Access:   Current line(s): PIV     Genitourinary:   Urinary status: voiding, incontinent and external catheter    Respiratory:   O2 Device: Room air  Chronic home O2 use?: NO  Incentive spirometer at bedside: NO     GI:  Last Bowel Movement Date: 01/28/21  Current diet:  NPO  Passing flatus: Yes  Tolerating current diet: Yes  % Diet Eaten: 85 %    Pain Management:   Patient states pain is manageable on current regimen: YES    Skin:  Cordell Score: 19  Interventions: turn team, float heels, increase time out of bed, foam dressing, PT/OT consult, limit briefs, internal/external urinary devices and nutritional support     Patient Safety:  Fall Score:  Total Score: 4  Interventions: bed/chair alarm, assistive device (walker, cane, etc), gripper socks, pt to call before getting OOB and stay with me (per policy)  High Fall Risk: Yes    Length of Stay:  Expected LOS: 2d 14h  Actual LOS: 1      Anusha Diaz RN

## 2021-01-29 NOTE — PROGRESS NOTES
Hospitalist Progress Note    NAME: Emiliano Douglas   :  1934   MRN:  [id number]       Assessment / Plan:  Generalized weakness  Hx of Symptomatic orthostatic hypotension  S/P GLM at home  CAD  HTN  Anemia   Mild drop in H/H  iron studies. With iron saturation 16%, low TIBC also so likely both element of chronic illness and iron defeciency, MCV low. Will start IV iron. FOBT. No evidence of Hemolysis or active bleeding  Consulted GI planning for EGD in the a.m. Check orthostatics       C/w norvasc    Compression stockings  No evidence of infection  PE is non focal  CXR negative  Pelvis xray is negative  Resume Home meds as per Med rec. PT/OT/CM   , discussed with Ms. Ritu Waters and she is not willing to go for rehab considering previous bad experience. Discussed also with son to explore options and he is unable to provide 24/7 supervision at home. Discussed this with CM will explore options       Code Status: Full  Surrogate Decision Maker: Son  DVT Prophylaxis: Lovenox  GI Prophylaxis: PO pantoprazole   Baseline: Discharge from Rehab recently           Subjective:     Chief Complaint / Reason for Physician Visit  FU ambulatory dysfunction . No acute issues. Will undergo EGD in the morning. Discussed with RN events overnight. Review of Systems:  Symptom Y/N Comments  Symptom Y/N Comments   Fever/Chills n   Chest Pain n    Poor Appetite    Edema     Cough    Abdominal Pain n    Sputum    Joint Pain     SOB/AGUILERA n   Pruritis/Rash     Nausea/vomit    Tolerating PT/OT     Diarrhea    Tolerating Diet     Constipation    Other       Could NOT obtain due to:      Objective:     VITALS:   Last 24hrs VS reviewed since prior progress note.  Most recent are:  Patient Vitals for the past 24 hrs:   Temp Pulse Resp BP SpO2   21 98.5 °F (36.9 °C) 76 18 (!) 122/56 95 %   21 1537   16     21 1535 98 °F (36.7 °C) 74 18 (!) 119/56 95 %   21 1533  90  136/63    21 1519  89  (!) 144/65    01/28/21 1513  75  (!) 119/56    01/28/21 1148 98.3 °F (36.8 °C) 76 18 (!) 128/57 97 %   01/28/21 1044  75  122/64    01/28/21 1038  81  (!) 98/59    01/28/21 1034  84  (!) 119/57    01/28/21 1031  84  (!) 81/39    01/28/21 1017    (!) 148/74    01/28/21 1011  77  136/66    01/28/21 0713 98.3 °F (36.8 °C) 76 16 120/64 94 %   01/28/21 0254 98.3 °F (36.8 °C) 76 16 (!) 122/54 94 %       Intake/Output Summary (Last 24 hours) at 1/28/2021 2338  Last data filed at 1/28/2021 2244  Gross per 24 hour   Intake 1080 ml   Output 1076 ml   Net 4 ml        I had a face to face encounter and independently examined this patient on 1/28/2021, as outlined below:  PHYSICAL EXAM:  General: WD, WN. Alert, cooperative, no acute distress    EENT:  EOMI. Anicteric sclerae. MMM  Resp:  CTA bilaterally, no wheezing or rales. No accessory muscle use  CV:  Regular  rhythm,  No edema  GI:  Soft, Non distended, Non tender. +Bowel sounds  Neurologic:  Alert and oriented X 3, normal speech,   Psych:   Good insight. Not anxious nor agitated  Skin:  No rashes. No jaundice    Reviewed most current lab test results and cultures  YES  Reviewed most current radiology test results   YES  Review and summation of old records today    YES  Reviewed patient's current orders and MAR    YES  PMH/SH reviewed - no change compared to H&P  ________________________________________________________________________  Care Plan discussed with:    Comments   Patient x    Family      RN x    Care Manager     Consultant                        Multidiciplinary team rounds were held today with , nursing, pharmacist and clinical coordinator. Patient's plan of care was discussed; medications were reviewed and discharge planning was addressed.      ________________________________________________________________________  Total NON critical care TIME 42  Minutes    Total CRITICAL CARE TIME Spent:   Minutes non procedure based      Comments   >50% of visit spent in counseling and coordination of care x    ________________________________________________________________________  Aisha Monroy MD     Procedures: see electronic medical records for all procedures/Xrays and details which were not copied into this note but were reviewed prior to creation of Plan. LABS:  I reviewed today's most current labs and imaging studies.   Pertinent labs include:  Recent Labs     01/28/21  0421 01/27/21  0309   WBC 4.9 4.2   HGB 9.8* 10.0*   HCT 30.4* 30.8*    246     Recent Labs     01/27/21  0309      K 4.1   *   CO2 25   GLU 97   BUN 20   CREA 1.11*   CA 9.2   MG 1.9       Signed: Aisha Monroy MD

## 2021-01-29 NOTE — PROGRESS NOTES
TRANSFER - OUT REPORT:    Verbal report given to Jamar Chahal RN on Jose L Little  being transferred to [facility] for routine progression of care       Report consisted of patients Situation, Background, Assessment and   Recommendations(SBAR). Information from the following report(s) SBAR was reviewed with the receiving nurse. Lines:   Peripheral IV 01/25/21 Right Arm (Active)   Site Assessment Clean, dry, & intact 01/29/21 0732   Phlebitis Assessment 0 01/29/21 0732   Infiltration Assessment 0 01/29/21 0732   Dressing Status Clean, dry, & intact 01/29/21 0732   Dressing Type Transparent 01/29/21 0732   Hub Color/Line Status Capped;Flushed 01/29/21 0148   Action Taken Open ports on tubing capped 01/29/21 0148   Alcohol Cap Used Yes 01/29/21 0148        Opportunity for questions and clarification was provided.       Patient transported with:

## 2021-01-29 NOTE — ANESTHESIA PREPROCEDURE EVALUATION
Relevant Problems   No relevant active problems       Anesthetic History   No history of anesthetic complications            Review of Systems / Medical History  Patient summary reviewed, nursing notes reviewed and pertinent labs reviewed    Pulmonary  Within defined limits                 Neuro/Psych   Within defined limits           Cardiovascular  Within defined limits  Hypertension          CAD    Exercise tolerance: >4 METS     GI/Hepatic/Renal  Within defined limits              Endo/Other  Within defined limits           Other Findings              Physical Exam    Airway  Mallampati: II  TM Distance: 4 - 6 cm  Neck ROM: normal range of motion   Mouth opening: Normal     Cardiovascular  Regular rate and rhythm,  S1 and S2 normal,  no murmur, click, rub, or gallop             Dental    Dentition: Edentulous     Pulmonary  Breath sounds clear to auscultation               Abdominal  GI exam deferred       Other Findings            Anesthetic Plan    ASA: 3  Anesthesia type: general and total IV anesthesia          Induction: Intravenous  Anesthetic plan and risks discussed with: Patient      Propofol MAC

## 2021-01-29 NOTE — H&P
Date of Surgery Update: Leslie Pitts was seen and examined. History and physical has been reviewed. The patient has been examined.  There have been no significant clinical changes since the completion of the originally dated History and Physical.    Signed By: Terry Sahu MD     January 29, 2021 10:18 AM

## 2021-01-29 NOTE — ANESTHESIA POSTPROCEDURE EVALUATION
Procedure(s):  ESOPHAGOGASTRODUODENOSCOPY (EGD)  ESOPHAGOGASTRODUODENAL (EGD) BIOPSY. total IV anesthesia    Anesthesia Post Evaluation        Patient location during evaluation: PACU  Note status: Adequate. Level of consciousness: responsive to verbal stimuli and sleepy but conscious  Pain management: satisfactory to patient  Airway patency: patent  Anesthetic complications: no  Cardiovascular status: acceptable  Respiratory status: acceptable  Hydration status: acceptable  Comments: +Post-Anesthesia Evaluation and Assessment    Patient: Ai Felix MRN: [id number]  SSN: [id number]   YOB: 1934  Age: 80 y.o. Sex: female      Cardiovascular Function/Vital Signs    BP (!) 146/65 (BP 1 Location: Left upper arm, BP Patient Position: Supine)   Pulse 68   Temp 36.6 °C (97.8 °F)   Resp 14   Ht 5' 4\" (1.626 m)   Wt 65.8 kg (145 lb 1 oz)   SpO2 99%   BMI 24.90 kg/m²     Patient is status post Procedure(s):  ESOPHAGOGASTRODUODENOSCOPY (EGD)  ESOPHAGOGASTRODUODENAL (EGD) BIOPSY. Nausea/Vomiting: Controlled. Postoperative hydration reviewed and adequate. Pain:  Pain Scale 1: Numeric (0 - 10) (01/29/21 1151)  Pain Intensity 1: 0 (01/29/21 1151)   Managed. Neurological Status: At baseline. Mental Status and Level of Consciousness: Arousable. Pulmonary Status:   O2 Device: Room air (01/29/21 1151)   Adequate oxygenation and airway patent. Complications related to anesthesia: None    Post-anesthesia assessment completed. No concerns.     Signed By: Toribio Malhotra DO    1/29/2021  Post anesthesia nausea and vomiting:  controlled      INITIAL Post-op Vital signs:   Vitals Value Taken Time   /65 01/29/21 1151   Temp 36.6 °C (97.8 °F) 01/29/21 1151   Pulse 68 01/29/21 1151   Resp 14 01/29/21 1151   SpO2 99 % 01/29/21 1151

## 2021-01-29 NOTE — PROGRESS NOTES
Problem: Falls - Risk of  Goal: *Absence of Falls  Description: Document Berta Rey Fall Risk and appropriate interventions in the flowsheet.   Outcome: Progressing Towards Goal  Note: Fall Risk Interventions:  Mobility Interventions: Bed/chair exit alarm, Communicate number of staff needed for ambulation/transfer, OT consult for ADLs, Patient to call before getting OOB, PT Consult for mobility concerns, PT Consult for assist device competence, Strengthening exercises (ROM-active/passive)         Medication Interventions: Assess postural VS orthostatic hypotension, Bed/chair exit alarm, Evaluate medications/consider consulting pharmacy, Patient to call before getting OOB, Teach patient to arise slowly    Elimination Interventions: Bed/chair exit alarm, Call light in reach, Elevated toilet seat, Stay With Me (per policy), Toilet paper/wipes in reach, Patient to call for help with toileting needs, Toileting schedule/hourly rounds    History of Falls Interventions: Bed/chair exit alarm, Consult care management for discharge planning, Door open when patient unattended, Evaluate medications/consider consulting pharmacy, Investigate reason for fall, Room close to nurse's station, Utilize gait belt for transfer/ambulation         Problem: Patient Education: Go to Patient Education Activity  Goal: Patient/Family Education  Outcome: Progressing Towards Goal     Problem: Patient Education: Go to Patient Education Activity  Goal: Patient/Family Education  Outcome: Progressing Towards Goal     Problem: Patient Education: Go to Patient Education Activity  Goal: Patient/Family Education  Outcome: Progressing Towards Goal

## 2021-01-29 NOTE — PROGRESS NOTES
Physical Therapy    Chart reviewed. Spoke with RN. Pt currently BETTY for EGD. Will continue to follow as able.  Continue to recommend SNF rehab but if pt is not open to it would need 24/hr supervision/assist.    Ursula Warren, PT

## 2021-01-29 NOTE — PROGRESS NOTES
Occupational Therapy  Medical record reviewed. Pt is off the floor for EGD. Will  Defer and continue to follow.

## 2021-01-29 NOTE — PROGRESS NOTES
TRANSFER - IN REPORT:    Verbal report received from [facility] (name) on John Gary  being received from Neuro, room 0027 (unit) for ordered procedure      Report consisted of patients Situation, Background, Assessment and   Recommendations(SBAR). Information from the following report(s) SBAR, Procedure Summary, Intake/Output, MAR and Recent Results was reviewed with the receiving nurse. Opportunity for questions and clarification was provided. Will give report to primary Endoscopy nurse upon arrival to unit.

## 2021-01-30 LAB
ANION GAP SERPL CALC-SCNC: 5 MMOL/L (ref 5–15)
BASOPHILS # BLD: 0.1 K/UL (ref 0–0.1)
BASOPHILS NFR BLD: 1 % (ref 0–1)
BUN SERPL-MCNC: 21 MG/DL (ref 6–20)
BUN/CREAT SERPL: 20 (ref 12–20)
CALCIUM SERPL-MCNC: 9.9 MG/DL (ref 8.5–10.1)
CHLORIDE SERPL-SCNC: 107 MMOL/L (ref 97–108)
CO2 SERPL-SCNC: 26 MMOL/L (ref 21–32)
CREAT SERPL-MCNC: 1.04 MG/DL (ref 0.55–1.02)
DIFFERENTIAL METHOD BLD: ABNORMAL
EOSINOPHIL # BLD: 0.3 K/UL (ref 0–0.4)
EOSINOPHIL NFR BLD: 5 % (ref 0–7)
ERYTHROCYTE [DISTWIDTH] IN BLOOD BY AUTOMATED COUNT: 15.8 % (ref 11.5–14.5)
GLUCOSE SERPL-MCNC: 90 MG/DL (ref 65–100)
HCT VFR BLD AUTO: 32.3 % (ref 35–47)
HGB BLD-MCNC: 10.3 G/DL (ref 11.5–16)
IMM GRANULOCYTES # BLD AUTO: 0.2 K/UL (ref 0–0.04)
IMM GRANULOCYTES NFR BLD AUTO: 3 % (ref 0–0.5)
LYMPHOCYTES # BLD: 1.8 K/UL (ref 0.8–3.5)
LYMPHOCYTES NFR BLD: 31 % (ref 12–49)
MAGNESIUM SERPL-MCNC: 1.9 MG/DL (ref 1.6–2.4)
MCH RBC QN AUTO: 25.1 PG (ref 26–34)
MCHC RBC AUTO-ENTMCNC: 31.9 G/DL (ref 30–36.5)
MCV RBC AUTO: 78.6 FL (ref 80–99)
MONOCYTES # BLD: 0.6 K/UL (ref 0–1)
MONOCYTES NFR BLD: 11 % (ref 5–13)
NEUTS SEG # BLD: 2.9 K/UL (ref 1.8–8)
NEUTS SEG NFR BLD: 49 % (ref 32–75)
NRBC # BLD: 0 K/UL (ref 0–0.01)
NRBC BLD-RTO: 0 PER 100 WBC
PLATELET # BLD AUTO: 345 K/UL (ref 150–400)
PMV BLD AUTO: 10.2 FL (ref 8.9–12.9)
POTASSIUM SERPL-SCNC: 4 MMOL/L (ref 3.5–5.1)
RBC # BLD AUTO: 4.11 M/UL (ref 3.8–5.2)
RBC MORPH BLD: ABNORMAL
RBC MORPH BLD: ABNORMAL
SODIUM SERPL-SCNC: 138 MMOL/L (ref 136–145)
WBC # BLD AUTO: 5.9 K/UL (ref 3.6–11)

## 2021-01-30 PROCEDURE — 74011250636 HC RX REV CODE- 250/636: Performed by: HOSPITALIST

## 2021-01-30 PROCEDURE — 74011250637 HC RX REV CODE- 250/637: Performed by: HOSPITALIST

## 2021-01-30 PROCEDURE — 96376 TX/PRO/DX INJ SAME DRUG ADON: CPT

## 2021-01-30 PROCEDURE — 74011250637 HC RX REV CODE- 250/637: Performed by: NURSE PRACTITIONER

## 2021-01-30 PROCEDURE — 99218 HC RM OBSERVATION: CPT

## 2021-01-30 PROCEDURE — 83735 ASSAY OF MAGNESIUM: CPT

## 2021-01-30 PROCEDURE — 36415 COLL VENOUS BLD VENIPUNCTURE: CPT

## 2021-01-30 PROCEDURE — 96372 THER/PROPH/DIAG INJ SC/IM: CPT

## 2021-01-30 PROCEDURE — 94760 N-INVAS EAR/PLS OXIMETRY 1: CPT

## 2021-01-30 PROCEDURE — 74011250636 HC RX REV CODE- 250/636: Performed by: STUDENT IN AN ORGANIZED HEALTH CARE EDUCATION/TRAINING PROGRAM

## 2021-01-30 PROCEDURE — 80048 BASIC METABOLIC PNL TOTAL CA: CPT

## 2021-01-30 PROCEDURE — 85025 COMPLETE CBC W/AUTO DIFF WBC: CPT

## 2021-01-30 RX ADMIN — AMLODIPINE BESYLATE 5 MG: 5 TABLET ORAL at 09:22

## 2021-01-30 RX ADMIN — Medication 2000 UNITS: at 09:23

## 2021-01-30 RX ADMIN — NYSTATIN: 100000 POWDER TOPICAL at 09:23

## 2021-01-30 RX ADMIN — ENOXAPARIN SODIUM 30 MG: 30 INJECTION SUBCUTANEOUS at 09:23

## 2021-01-30 RX ADMIN — Medication 10 ML: at 06:33

## 2021-01-30 RX ADMIN — IRON SUCROSE 200 MG: 20 INJECTION, SOLUTION INTRAVENOUS at 09:23

## 2021-01-30 RX ADMIN — Medication 10 ML: at 14:00

## 2021-01-30 RX ADMIN — ACETAMINOPHEN 650 MG: 325 TABLET ORAL at 06:32

## 2021-01-30 RX ADMIN — NYSTATIN: 100000 POWDER TOPICAL at 17:41

## 2021-01-30 RX ADMIN — Medication 10 ML: at 21:06

## 2021-01-30 RX ADMIN — PANTOPRAZOLE SODIUM 40 MG: 40 TABLET, DELAYED RELEASE ORAL at 06:32

## 2021-01-30 NOTE — PROGRESS NOTES
1900 Bedside shift change report given to Raimundo Hayes (oncoming nurse) by Birdie Phillips (offgoing nurse). Report included the following information SBAR, Kardex, Intake/Output and Recent Results. End of Shift Note    Bedside shift change report given to RN (oncoming nurse) by Renell Dancer (offgoing nurse). Report included the following information SBAR, Kardex and Intake/Output    Shift worked:  7p-7a     Shift summary and any significant changes:     none     Concerns for physician to address:  none     [facility]   [phone]       Activity:  Activity Level: Up with Assistance  Number times ambulated in hallways past shift: 0  Number of times OOB to chair past shift: 0    Cardiac:   Cardiac Monitoring: No      Cardiac Rhythm: Normal sinus rhythm    Access:   Current line(s): PIV     Genitourinary:   Urinary status: voiding and external catheter    Respiratory:   O2 Device: Room air  Chronic home O2 use?: NO  Incentive spirometer at bedside: YES     GI:  Last Bowel Movement Date: 01/28/21  Current diet:  DIET ONE TIME MESSAGE  DIET ONE TIME MESSAGE  DIET ONE TIME MESSAGE  DIET ONE TIME MESSAGE  DIET REGULAR  Passing flatus: YES  Tolerating current diet: YES  % Diet Eaten: 90 %    Pain Management:   Patient states pain is manageable on current regimen: YES    Skin:  Cordell Score: 18  Interventions: increase time out of bed    Patient Safety:  Fall Score:  Total Score: 4  Interventions: bed/chair alarm  High Fall Risk: Yes    Length of Stay:  Expected LOS: 2d 14h  Actual LOS: 1      Renell Dancer

## 2021-01-30 NOTE — PROGRESS NOTES
Hospitalist Progress Note    NAME: Kathleen Barbosa   :  1934   MRN:  [id number]     I reviewed pertinent labs/imaging and discussed plan of care with Dr. Florinda Degroot who is in agreement. Assessment / Plan:  Generalized weakness  - PT has recommended rehab but so far patient has declined rehab. - Patient is now willing to consider rehab, CM notified to provide with list of options. CAD  HTN  H/O symptomatic hypotension  - Adequate BP control.  - Continue amlodipine 5 mg po daily. - CAD history unclear, patient was not on asa PTA - given anemia will not start at this time. Anemia   EGD 21:  OROPHARYNX:  Cords and pyriform recesses normal.   ESOPHAGUS:  The esophagus is normal.  The proximal, mid, and distal portions are normal.  The Z-Line is intact. STOMACH:  The fundus on antegrade and retroflex views is normal.  The body, antrum, and pylorus have patchy erythema, and focal erosions with scant acid hematin present. DUODENUM:  The bulb, second and third portions are normal.  - H/H remains stable. - Continue pantoprazole 40 mg po daily. - Continue iron sucrose 200 mg IV daily. - Will need colonoscopy if not while hospitalized as OP.    - Continue to monitor blood count. CKD stage III, baseline Cr 1.2-1.3  - Renal function improved today. - Continue to avoid nephrotoxins.  - Monitor. 18.5 - 24.9 Normal weight / Body mass index is 24.9 kg/m². Code status: Full  Prophylaxis: Lovenox  Recommended Disposition: TBD     Subjective:     Chief Complaint / Reason for Physician Visit  No complaints. Plan of care and pertinent events reviewed with bedside nurse.      Review of Systems:  Symptom Y/N Comments  Symptom Y/N Comments   Fever/Chills N   Chest Pain N    Poor Appetite N   Edema N    Cough N   Abdominal Pain N    Sputum N   Joint Pain N    SOB/AGUILERA N   Pruritis/Rash N    Nausea/vomit N   Tolerating PT/OT     Diarrhea N   Tolerating Diet Y    Constipation N   Other Could NOT obtain due to:      Objective:     VITALS:   Last 24hrs VS reviewed since prior progress note. Most recent are:  Patient Vitals for the past 24 hrs:   Temp Pulse Resp BP SpO2   01/30/21 1448 97.7 °F (36.5 °C) 81 16 136/62 97 %   01/30/21 1119 97.9 °F (36.6 °C) 74 16 (!) 119/58 100 %   01/30/21 0755    (!) 110/58    01/30/21 0750    (!) 127/59    01/30/21 0743 97.9 °F (36.6 °C) 82 16 (!) 141/66 96 %   01/30/21 0401 97.9 °F (36.6 °C) 83 12 (!) 144/64 96 %   01/29/21 2352 97.9 °F (36.6 °C) 70 14 122/61 95 %   01/29/21 2002 97.9 °F (36.6 °C) 69 16 (!) 115/56 96 %       Intake/Output Summary (Last 24 hours) at 1/30/2021 1820  Last data filed at 1/30/2021 1448  Gross per 24 hour   Intake    Output 1050 ml   Net -1050 ml        I had a face to face encounter and independently examined this patient on 1/30/2021, as outlined below:  PHYSICAL EXAM:  General:  A/A/O X 3. NAD. HEENT:  Normocephalic. Sclera anicteric. EOMI. Mucous membranes moist.    Chest:  Resps even/unlabored with symmetrical CWE. Air entry full. Lungs CTA. No use of accessory muscles. CV:  RRR. Normal S1/S2. No M/C/R appreciated. No JVD. No peripheral edema. GI:  Abdomen soft/NT/ND. No organomegaly. No hernia. ABT X 4.    :  Voiding. Neurologic:  Nonfocal.  CN II-XII grossly intact. Face symmetrical.  Speech normal.     Psych:  Cooperative. No anxiety or agitation. No suicidal/homicidal ideation. Skin:  No rashes or jaundice.       Reviewed most current lab test results and cultures  YES  Reviewed most current radiology test results   YES  Review and summation of old records today    NO  Reviewed patient's current orders and MAR    YES  PMH/SH reviewed - no change compared to H&P  ________________________________________________________________________  Care Plan discussed with:    Comments   Patient Y    Family      RN Y    Care Manager     Consultant                        Multidiciplinary team rounds were held today with , nursing, pharmacist and clinical coordinator. Patient's plan of care was discussed; medications were reviewed and discharge planning was addressed. ________________________________________________________________________  Deirdre Palmer NP     Procedures: see electronic medical records for all procedures/Xrays and details which were not copied into this note but were reviewed prior to creation of Plan. LABS:  I reviewed today's most current labs and imaging studies.   Pertinent labs include:  Recent Labs     01/30/21 0409 01/29/21 0341 01/28/21  0421   WBC 5.9 5.5 4.9   HGB 10.3* 10.1* 9.8*   HCT 32.3* 31.5* 30.4*    305 291     Recent Labs     01/30/21 0409 01/29/21  0341    138   K 4.0 4.2    106   CO2 26 28   GLU 90 93   BUN 21* 22*   CREA 1.04* 1.21*   CA 9.9 9.8   MG 1.9 1.7   PHOS  --  4.7       Signed: Deirdre Palmer NP

## 2021-01-30 NOTE — PROGRESS NOTES
Problem: Falls - Risk of  Goal: *Absence of Falls  Description: Document Matthew [place] Fall Risk and appropriate interventions in the flowsheet. Outcome: Progressing Towards Goal  Note: Fall Risk Interventions:  Mobility Interventions: Bed/chair exit alarm         Medication Interventions: Bed/chair exit alarm    Elimination Interventions: Bed/chair exit alarm, Call light in reach    History of Falls Interventions: Bed/chair exit alarm         Problem: Patient Education: Go to Patient Education Activity  Goal: Patient/Family Education  Outcome: Progressing Towards Goal     Problem: Patient Education: Go to Patient Education Activity  Goal: Patient/Family Education  Outcome: Progressing Towards Goal     Problem: Patient Education: Go to Patient Education Activity  Goal: Patient/Family Education  Outcome: Progressing Towards Goal     Problem: Pressure Injury - Risk of  Goal: *Prevention of pressure injury  Description: Document Cordell Scale and appropriate interventions in the flowsheet.   Outcome: Progressing Towards Goal  Note: Pressure Injury Interventions:  Sensory Interventions: Assess changes in LOC, Monitor skin under medical devices    Moisture Interventions: Check for incontinence Q2 hours and as needed    Activity Interventions: PT/OT evaluation    Mobility Interventions: HOB 30 degrees or less    Nutrition Interventions: Document food/fluid/supplement intake, Offer support with meals,snacks and hydration    Friction and Shear Interventions: Apply protective barrier, creams and emollients, HOB 30 degrees or less                Problem: Patient Education: Go to Patient Education Activity  Goal: Patient/Family Education  Outcome: Progressing Towards Goal

## 2021-01-30 NOTE — PROGRESS NOTES
Hospitalist Progress Note    NAME: Trice Quesada   :  1934   MRN:  [id number]     I reviewed pertinent labs/imaging and discussed plan of care with Dr. Abdullahi Zimmerman who is in agreement. Assessment / Plan:  Generalized weakness  - PT has recommended rehab but so far patient has declined rehab. - If patient is unwilling to be placed in SNF for rehab we ill have no choice but to discharge home with maximum available support. CAD  HTN  H/O symptomatic hypotension  - Adequate BP control.  - Continue amlodipine 5 mg po daily. - CAD history unclear, patient was not on asa PTA - given anemia will not start at this time. Anemia   EGD 21:  OROPHARYNX:  Cords and pyriform recesses normal.   ESOPHAGUS:  The esophagus is normal.  The proximal, mid, and distal portions are normal.  The Z-Line is intact. STOMACH:  The fundus on antegrade and retroflex views is normal.  The body, antrum, and pylorus have patchy erythema, and focal erosions with scant acid hematin present. DUODENUM:  The bulb, second and third portions are normal.  - Continue pantoprazole 40 mg po daily. - Continue iron sucrose 200 mg IV daily. - Will need colonoscopy if not while hospitalized as OP.    - Monitor blood count. CKD stage III, baseline Cr 1.2-1.3  - Avoid nephrotoxins.  - Monitor. 18.5 - 24.9 Normal weight / Body mass index is 24.9 kg/m². Code status: Full  Prophylaxis: Lovenox  Recommended Disposition: TBD     Subjective:     Chief Complaint / Reason for Physician Visit  No complaints. Plan of care and pertinent events reviewed with bedside nurse.      Review of Systems:  Symptom Y/N Comments  Symptom Y/N Comments   Fever/Chills N   Chest Pain N    Poor Appetite N   Edema N    Cough N   Abdominal Pain N    Sputum N   Joint Pain N    SOB/AGUILERA N   Pruritis/Rash N    Nausea/vomit N   Tolerating PT/OT     Diarrhea N   Tolerating Diet Y    Constipation N   Other       Could NOT obtain due to:      Objective: VITALS:   Last 24hrs VS reviewed since prior progress note. Most recent are:  Patient Vitals for the past 24 hrs:   Temp Pulse Resp BP SpO2   01/29/21 1530 97.9 °F (36.6 °C) 67 14 124/61 98 %   01/29/21 1151 97.8 °F (36.6 °C) 68 14 (!) 146/65 99 %   01/29/21 1127  67 11 137/61 90 %   01/29/21 1124  65 12 (!) 141/59 91 %   01/29/21 1121  67 10 (!) 141/55 92 %   01/29/21 1118  66 13 (!) 150/58 91 %   01/29/21 1116  85 16 (!) 145/76 92 %   01/29/21 1112  70 15 (!) 146/53 91 %   01/29/21 1109  67 13 (!) 138/57 92 %   01/29/21 1106  69 17 (!) 132/53 92 %   01/29/21 1103  68 15 (!) 137/57 92 %   01/29/21 1100  69 16 (!) 141/59 92 %   01/29/21 1057  70 16 (!) 140/63 93 %   01/29/21 1054  68 19 (!) 144/63 93 %   01/29/21 1051  71 23 136/66 93 %   01/29/21 1050 97.6 °F (36.4 °C) 71 23 136/66 93 %   01/29/21 1042  67 21 (!) 140/53 99 %   01/29/21 0732 97.7 °F (36.5 °C) 82 16 (!) 124/59 95 %   01/29/21 0332 98.7 °F (37.1 °C) 85 16 (!) 124/58 96 %   01/29/21 0015 98.8 °F (37.1 °C) 75 18 (!) 122/58 97 %   01/28/21 2013 98.5 °F (36.9 °C) 76 18 (!) 122/56 95 %       Intake/Output Summary (Last 24 hours) at 1/29/2021 1924  Last data filed at 1/29/2021 1738  Gross per 24 hour   Intake 800 ml   Output 650 ml   Net 150 ml        I had a face to face encounter and independently examined this patient on 1/29/2021, as outlined below:  PHYSICAL EXAM:  General:  A/A/O X 3. NAD. HEENT:  Normocephalic. Sclera anicteric. EOMI. Mucous membranes moist.    Chest:  Resps even/unlabored with symmetrical CWE. Air entry full. Lungs CTA. No use of accessory muscles. CV:  RRR. Normal S1/S2. No M/C/R appreciated. No JVD. No peripheral edema. GI:  Abdomen soft/NT/ND. No organomegaly. No hernia. ABT X 4.    :  Voiding. Neurologic:  Nonfocal.  CN II-XII grossly intact. Face symmetrical.  Speech normal.     Psych:  Cooperative. No anxiety or agitation. No suicidal/homicidal ideation.   Skin:  No rashes or jaundice. Reviewed most current lab test results and cultures  YES  Reviewed most current radiology test results   YES  Review and summation of old records today    NO  Reviewed patient's current orders and MAR    YES  PMH/SH reviewed - no change compared to H&P  ________________________________________________________________________  Care Plan discussed with:    Comments   Patient [address]     Consultant                        Multidiciplinary team rounds were held today with , nursing, pharmacist and clinical coordinator. Patient's plan of care was discussed; medications were reviewed and discharge planning was addressed. ________________________________________________________________________  Yan Cisneros NP     Procedures: see electronic medical records for all procedures/Xrays and details which were not copied into this note but were reviewed prior to creation of Plan. LABS:  I reviewed today's most current labs and imaging studies.   Pertinent labs include:  Recent Labs     01/29/21 0341 01/28/21  0421 01/27/21  0309   WBC 5.5 4.9 4.2   HGB 10.1* 9.8* 10.0*   HCT 31.5* 30.4* 30.8*    291 246     Recent Labs     01/29/21  0341 01/27/21  0309    140   K 4.2 4.1    109*   CO2 28 25   GLU 93 97   BUN 22* 20   CREA 1.21* 1.11*   CA 9.8 9.2   MG 1.7 1.9   PHOS 4.7  --        Signed: Yan Cisneros NP

## 2021-01-31 LAB
ANION GAP SERPL CALC-SCNC: 4 MMOL/L (ref 5–15)
BASOPHILS # BLD: 0.1 K/UL (ref 0–0.1)
BASOPHILS NFR BLD: 1 % (ref 0–1)
BUN SERPL-MCNC: 23 MG/DL (ref 6–20)
BUN/CREAT SERPL: 18 (ref 12–20)
CALCIUM SERPL-MCNC: 9.8 MG/DL (ref 8.5–10.1)
CHLORIDE SERPL-SCNC: 107 MMOL/L (ref 97–108)
CO2 SERPL-SCNC: 28 MMOL/L (ref 21–32)
CREAT SERPL-MCNC: 1.27 MG/DL (ref 0.55–1.02)
DIFFERENTIAL METHOD BLD: ABNORMAL
EOSINOPHIL # BLD: 0.2 K/UL (ref 0–0.4)
EOSINOPHIL NFR BLD: 3 % (ref 0–7)
ERYTHROCYTE [DISTWIDTH] IN BLOOD BY AUTOMATED COUNT: 15.9 % (ref 11.5–14.5)
GLUCOSE SERPL-MCNC: 74 MG/DL (ref 65–100)
HCT VFR BLD AUTO: 33.7 % (ref 35–47)
HGB BLD-MCNC: 10.7 G/DL (ref 11.5–16)
IMM GRANULOCYTES # BLD AUTO: 0.3 K/UL (ref 0–0.04)
IMM GRANULOCYTES NFR BLD AUTO: 4 % (ref 0–0.5)
LYMPHOCYTES # BLD: 2.5 K/UL (ref 0.8–3.5)
LYMPHOCYTES NFR BLD: 33 % (ref 12–49)
MCH RBC QN AUTO: 25.1 PG (ref 26–34)
MCHC RBC AUTO-ENTMCNC: 31.8 G/DL (ref 30–36.5)
MCV RBC AUTO: 79.1 FL (ref 80–99)
MONOCYTES # BLD: 1.1 K/UL (ref 0–1)
MONOCYTES NFR BLD: 14 % (ref 5–13)
NEUTS SEG # BLD: 3.4 K/UL (ref 1.8–8)
NEUTS SEG NFR BLD: 45 % (ref 32–75)
NRBC # BLD: 0 K/UL (ref 0–0.01)
NRBC BLD-RTO: 0 PER 100 WBC
PLATELET # BLD AUTO: 380 K/UL (ref 150–400)
PMV BLD AUTO: 10.4 FL (ref 8.9–12.9)
POTASSIUM SERPL-SCNC: 4.1 MMOL/L (ref 3.5–5.1)
RBC # BLD AUTO: 4.26 M/UL (ref 3.8–5.2)
RBC MORPH BLD: ABNORMAL
SODIUM SERPL-SCNC: 139 MMOL/L (ref 136–145)
WBC # BLD AUTO: 7.6 K/UL (ref 3.6–11)
WBC MORPH BLD: ABNORMAL

## 2021-01-31 PROCEDURE — 74011250636 HC RX REV CODE- 250/636: Performed by: STUDENT IN AN ORGANIZED HEALTH CARE EDUCATION/TRAINING PROGRAM

## 2021-01-31 PROCEDURE — 74011250636 HC RX REV CODE- 250/636: Performed by: HOSPITALIST

## 2021-01-31 PROCEDURE — 96376 TX/PRO/DX INJ SAME DRUG ADON: CPT

## 2021-01-31 PROCEDURE — 74011250637 HC RX REV CODE- 250/637: Performed by: HOSPITALIST

## 2021-01-31 PROCEDURE — 99218 HC RM OBSERVATION: CPT

## 2021-01-31 PROCEDURE — 85025 COMPLETE CBC W/AUTO DIFF WBC: CPT

## 2021-01-31 PROCEDURE — 96372 THER/PROPH/DIAG INJ SC/IM: CPT

## 2021-01-31 PROCEDURE — 36415 COLL VENOUS BLD VENIPUNCTURE: CPT

## 2021-01-31 PROCEDURE — 94760 N-INVAS EAR/PLS OXIMETRY 1: CPT

## 2021-01-31 PROCEDURE — 74011250637 HC RX REV CODE- 250/637: Performed by: NURSE PRACTITIONER

## 2021-01-31 PROCEDURE — 80048 BASIC METABOLIC PNL TOTAL CA: CPT

## 2021-01-31 RX ADMIN — ACETAMINOPHEN 650 MG: 325 TABLET ORAL at 06:41

## 2021-01-31 RX ADMIN — Medication 2000 UNITS: at 08:31

## 2021-01-31 RX ADMIN — PANTOPRAZOLE SODIUM 40 MG: 40 TABLET, DELAYED RELEASE ORAL at 06:41

## 2021-01-31 RX ADMIN — Medication 10 ML: at 06:15

## 2021-01-31 RX ADMIN — IRON SUCROSE 200 MG: 20 INJECTION, SOLUTION INTRAVENOUS at 08:31

## 2021-01-31 RX ADMIN — Medication 10 ML: at 22:20

## 2021-01-31 RX ADMIN — NYSTATIN: 100000 POWDER TOPICAL at 22:31

## 2021-01-31 RX ADMIN — NYSTATIN: 100000 POWDER TOPICAL at 08:31

## 2021-01-31 RX ADMIN — AMLODIPINE BESYLATE 5 MG: 5 TABLET ORAL at 08:31

## 2021-01-31 RX ADMIN — ENOXAPARIN SODIUM 30 MG: 30 INJECTION SUBCUTANEOUS at 08:31

## 2021-01-31 NOTE — PROGRESS NOTES
Bedside shift change report given to Eliud Thapa (oncoming nurse) by Lola Vaca (offgoing nurse). Report included the following information SBAR, Kardex, Intake/Output and Recent Results.

## 2021-01-31 NOTE — PROGRESS NOTES
Hospitalist Progress Note    NAME: Shravan Heredia   :  1934   MRN:  [id number]     I reviewed pertinent labs/imaging and discussed plan of care with Dr. Alfonso Vu who is in agreement. Assessment / Plan:  Generalized weakness  - Patient remains weak. - Approx 20 mm drop in systolic BP from lying to standing without significant symptoms.   - Patient is now willing to consider rehab, CM notified to provide with list of options. CAD  HTN  H/O symptomatic hypotension  - Adequate BP control.  - Continue amlodipine 5 mg po daily. - CAD history unclear, patient was not on asa PTA - given anemia will not start at this time. Anemia   EGD 21:  OROPHARYNX:  Cords and pyriform recesses normal.   ESOPHAGUS:  The esophagus is normal.  The proximal, mid, and distal portions are normal.  The Z-Line is intact. STOMACH:  The fundus on antegrade and retroflex views is normal.  The body, antrum, and pylorus have patchy erythema, and focal erosions with scant acid hematin present. DUODENUM:  The bulb, second and third portions are normal.  - H/H remains stable, labs this a.m. pending.    - Continue pantoprazole 40 mg po daily. - Continue iron sucrose 200 mg IV daily, stop after 5 doses. - Spoke with GI and no plans at this time for colonoscopy. - Continue to monitor blood count. CKD stage III, baseline Cr 1.2-1.3  - Labs pending today. - Continue to avoid nephrotoxins.  - Monitor. 18.5 - 24.9 Normal weight / Body mass index is 24.9 kg/m². Code status: Full  Prophylaxis: Lovenox  Recommended Disposition: TBD     Subjective:     Chief Complaint / Reason for Physician Visit  Complains of back and leg pain. States that she doesn't like to take pain medication. Plan of care and pertinent events reviewed with bedside nurse.      Review of Systems:  Symptom Y/N Comments  Symptom Y/N Comments   Fever/Chills N   Chest Pain N    Poor Appetite N   Edema N    Cough N   Abdominal Pain N Sputum N   Joint Pain N    SOB/AGUILERA N   Pruritis/Rash N    Nausea/vomit N   Tolerating PT/OT     Diarrhea N   Tolerating Diet Y    Constipation N   Other       Could NOT obtain due to:      Objective:     VITALS:   Last 24hrs VS reviewed since prior progress note. Most recent are:  Patient Vitals for the past 24 hrs:   Temp Pulse Resp BP SpO2   01/31/21 0720 98.2 °F (36.8 °C) 76 16 131/60 95 %   01/31/21 0311 98.2 °F (36.8 °C) 79 16 117/75 95 %   01/30/21 2302 98.2 °F (36.8 °C) 85 18 128/62 (!) 85 %   01/30/21 1829 99.1 °F (37.3 °C) 78 16 128/60 96 %   01/30/21 1448 97.7 °F (36.5 °C) 81 16 136/62 97 %   01/30/21 1119 97.9 °F (36.6 °C) 74 16 (!) 119/58 100 %       Intake/Output Summary (Last 24 hours) at 1/31/2021 0913  Last data filed at 1/31/2021 0857  Gross per 24 hour   Intake 440 ml   Output 1225 ml   Net -785 ml        I had a face to face encounter and independently examined this patient on 1/31/2021, as outlined below:  PHYSICAL EXAM:  General:  A/A/O X 3. NAD. HEENT:  Normocephalic. Sclera anicteric. EOMI. Mucous membranes moist.    Chest:  Resps even/unlabored with symmetrical CWE. Air entry full. Lungs CTA. No use of accessory muscles. CV:  RRR. Normal S1/S2. No M/C/R appreciated. No JVD. No peripheral edema. GI:  Abdomen soft/NT/ND. No organomegaly. No hernia. ABT X 4.    :  Voiding. Neurologic:  Nonfocal.  CN II-XII grossly intact. Face symmetrical.  Speech normal.     Psych:  Cooperative. No anxiety or agitation. No suicidal/homicidal ideation. Skin:  No rashes or jaundice.       Reviewed most current lab test results and cultures  YES  Reviewed most current radiology test results   YES  Review and summation of old records today    NO  Reviewed patient's current orders and MAR    YES  PMH/SH reviewed - no change compared to H&P  ________________________________________________________________________  Care Plan discussed with:    Comments   Patient Y    Family      RN Y Care Manager     Consultant                        Multidiciplinary team rounds were held today with , nursing, pharmacist and clinical coordinator. Patient's plan of care was discussed; medications were reviewed and discharge planning was addressed. ________________________________________________________________________  Lauro Bullock NP     Procedures: see electronic medical records for all procedures/Xrays and details which were not copied into this note but were reviewed prior to creation of Plan. LABS:  I reviewed today's most current labs and imaging studies.   Pertinent labs include:  Recent Labs     01/30/21  0409 01/29/21  0341   WBC 5.9 5.5   HGB 10.3* 10.1*   HCT 32.3* 31.5*    305     Recent Labs     01/30/21  0409 01/29/21  0341    138   K 4.0 4.2    106   CO2 26 28   GLU 90 93   BUN 21* 22*   CREA 1.04* 1.21*   CA 9.9 9.8   MG 1.9 1.7   PHOS  --  4.7       Signed: Lauro Bullock, NP

## 2021-01-31 NOTE — PROGRESS NOTES
End of Shift Note    Bedside shift change report given to Pratima Dennis (oncoming nurse) by Bran Luu (offgoing nurse). Report included the following information SBAR, Kardex, MAR and Recent Results    Shift worked:  night     Shift summary and any significant changes:     none     Concerns for physician to address:  none     Zone phone for oncoming shift:   [phone]       Activity:  Activity Level: Up with Assistance  Number times ambulated in hallways past shift: 0  Number of times OOB to chair past shift: 0    Cardiac:   Cardiac Monitoring: No      Cardiac Rhythm: Normal sinus rhythm    Access:   Current line(s): PIV     Genitourinary:   Urinary status: voiding    Respiratory:   O2 Device: Room air  Chronic home O2 use?: NO  Incentive spirometer at bedside: NO     GI:  Last Bowel Movement Date: 01/28/21  Current diet:  DIET ONE TIME MESSAGE  DIET ONE TIME MESSAGE  DIET ONE TIME MESSAGE  DIET ONE TIME MESSAGE  DIET REGULAR  Passing flatus: YES  Tolerating current diet: YES  % Diet Eaten: 90 %    Pain Management:   Patient states pain is manageable on current regimen: N/A    Skin:  Cordell Score: 18  Interventions: increase time out of bed and internal/external urinary devices    Patient Safety:  Fall Score:  Total Score: 4  Interventions: bed/chair alarm, assistive device (walker, cane, etc), gripper socks and pt to call before getting OOB  High Fall Risk: Yes    Length of Stay:  Expected LOS: 2d 14h  Actual LOS: [facility]

## 2021-01-31 NOTE — PROGRESS NOTES
Problem: Falls - Risk of  Goal: *Absence of Falls  Description: Document Aly Tubbs Fall Risk and appropriate interventions in the flowsheet. Outcome: Progressing Towards Goal  Note: Fall Risk Interventions:  Mobility Interventions: Bed/chair exit alarm, Patient to call before getting OOB         Medication Interventions: Bed/chair exit alarm    Elimination Interventions: Bed/chair exit alarm, Call light in reach    History of Falls Interventions: Bed/chair exit alarm         Problem: Patient Education: Go to Patient Education Activity  Goal: Patient/Family Education  Outcome: Progressing Towards Goal     Problem: Patient Education: Go to Patient Education Activity  Goal: Patient/Family Education  Outcome: Progressing Towards Goal     Problem: Patient Education: Go to Patient Education Activity  Goal: Patient/Family Education  Outcome: Progressing Towards Goal     Problem: Pressure Injury - Risk of  Goal: *Prevention of pressure injury  Description: Document Cordell Scale and appropriate interventions in the flowsheet.   Outcome: Progressing Towards Goal  Note: Pressure Injury Interventions:  Sensory Interventions: Assess changes in LOC, Check visual cues for pain, Minimize linen layers    Moisture Interventions: Apply protective barrier, creams and emollients, Check for incontinence Q2 hours and as needed, Internal/External urinary devices    Activity Interventions: Chair cushion, Increase time out of bed    Mobility Interventions: HOB 30 degrees or less    Nutrition Interventions: Document food/fluid/supplement intake, Offer support with meals,snacks and hydration    Friction and Shear Interventions: Apply protective barrier, creams and emollients, HOB 30 degrees or less                Problem: Patient Education: Go to Patient Education Activity  Goal: Patient/Family Education  Outcome: Progressing Towards Goal

## 2021-02-01 PROBLEM — D64.9 ANEMIA: Status: ACTIVE | Noted: 2021-02-01

## 2021-02-01 PROBLEM — I10 HTN (HYPERTENSION): Status: ACTIVE | Noted: 2021-02-01

## 2021-02-01 LAB
ANION GAP SERPL CALC-SCNC: 6 MMOL/L (ref 5–15)
BUN SERPL-MCNC: 25 MG/DL (ref 6–20)
BUN/CREAT SERPL: 20 (ref 12–20)
CALCIUM SERPL-MCNC: 9.6 MG/DL (ref 8.5–10.1)
CHLORIDE SERPL-SCNC: 109 MMOL/L (ref 97–108)
CO2 SERPL-SCNC: 26 MMOL/L (ref 21–32)
COVID-19 RAPID TEST, COVR: NOT DETECTED
CREAT SERPL-MCNC: 1.23 MG/DL (ref 0.55–1.02)
GLUCOSE SERPL-MCNC: 85 MG/DL (ref 65–100)
HCT VFR BLD AUTO: 31.2 % (ref 35–47)
HGB BLD-MCNC: 9.9 G/DL (ref 11.5–16)
POTASSIUM SERPL-SCNC: 4 MMOL/L (ref 3.5–5.1)
SARS-COV-2, COV2: NORMAL
SODIUM SERPL-SCNC: 141 MMOL/L (ref 136–145)
SOURCE, COVRS: NORMAL

## 2021-02-01 PROCEDURE — 94760 N-INVAS EAR/PLS OXIMETRY 1: CPT

## 2021-02-01 PROCEDURE — 87635 SARS-COV-2 COVID-19 AMP PRB: CPT

## 2021-02-01 PROCEDURE — 96376 TX/PRO/DX INJ SAME DRUG ADON: CPT

## 2021-02-01 PROCEDURE — 74011250637 HC RX REV CODE- 250/637: Performed by: HOSPITALIST

## 2021-02-01 PROCEDURE — 99218 HC RM OBSERVATION: CPT

## 2021-02-01 PROCEDURE — 80048 BASIC METABOLIC PNL TOTAL CA: CPT

## 2021-02-01 PROCEDURE — 74011250636 HC RX REV CODE- 250/636: Performed by: NURSE PRACTITIONER

## 2021-02-01 PROCEDURE — 74011250637 HC RX REV CODE- 250/637: Performed by: NURSE PRACTITIONER

## 2021-02-01 PROCEDURE — 36415 COLL VENOUS BLD VENIPUNCTURE: CPT

## 2021-02-01 PROCEDURE — 85018 HEMOGLOBIN: CPT

## 2021-02-01 PROCEDURE — 74011250636 HC RX REV CODE- 250/636: Performed by: HOSPITALIST

## 2021-02-01 PROCEDURE — 97110 THERAPEUTIC EXERCISES: CPT

## 2021-02-01 PROCEDURE — 97116 GAIT TRAINING THERAPY: CPT

## 2021-02-01 PROCEDURE — 65270000029 HC RM PRIVATE

## 2021-02-01 RX ORDER — POLYETHYLENE GLYCOL 3350 17 G/17G
17 POWDER, FOR SOLUTION ORAL DAILY
Qty: 30 PACKET | Refills: 0 | Status: SHIPPED
Start: 2021-02-01 | End: 2021-03-03

## 2021-02-01 RX ORDER — PANTOPRAZOLE SODIUM 40 MG/1
40 TABLET, DELAYED RELEASE ORAL
Qty: 30 TAB | Refills: 0 | Status: SHIPPED
Start: 2021-02-02 | End: 2021-03-04

## 2021-02-01 RX ORDER — FERROUS GLUCONATE 324(37.5)
324 TABLET ORAL
Qty: 30 TAB | Refills: 0 | Status: SHIPPED
Start: 2021-02-01 | End: 2021-03-03

## 2021-02-01 RX ORDER — NYSTATIN 100000 [USP'U]/G
POWDER TOPICAL 2 TIMES DAILY
Qty: 1 BOTTLE | Refills: 0 | Status: SHIPPED
Start: 2021-02-02 | End: 2021-02-12

## 2021-02-01 RX ADMIN — Medication 10 ML: at 14:00

## 2021-02-01 RX ADMIN — NYSTATIN: 100000 POWDER TOPICAL at 09:00

## 2021-02-01 RX ADMIN — PANTOPRAZOLE SODIUM 40 MG: 40 TABLET, DELAYED RELEASE ORAL at 08:04

## 2021-02-01 RX ADMIN — Medication 2000 UNITS: at 08:04

## 2021-02-01 RX ADMIN — Medication 10 ML: at 05:11

## 2021-02-01 RX ADMIN — IRON SUCROSE 200 MG: 20 INJECTION, SOLUTION INTRAVENOUS at 08:04

## 2021-02-01 RX ADMIN — Medication 10 ML: at 21:50

## 2021-02-01 RX ADMIN — NYSTATIN: 100000 POWDER TOPICAL at 18:00

## 2021-02-01 RX ADMIN — ENOXAPARIN SODIUM 30 MG: 30 INJECTION SUBCUTANEOUS at 08:04

## 2021-02-01 RX ADMIN — AMLODIPINE BESYLATE 5 MG: 5 TABLET ORAL at 08:04

## 2021-02-01 RX ADMIN — ACETAMINOPHEN 650 MG: 325 TABLET ORAL at 08:03

## 2021-02-01 NOTE — PROGRESS NOTES
Bedside shift change report given to Hema Bui (oncoming nurse) by Guillermo Marcus (offgoing nurse).   Report included the following information SBAR, Kardex, [facility] and Recent Results

## 2021-02-01 NOTE — DISCHARGE INSTRUCTIONS

## 2021-02-01 NOTE — PROGRESS NOTES
Transition of Care Plan to SNF/Rehab    SNF/Rehab Transition:  Patient has been accepted to [facility] and meets criteria for admission. Patient will transported by [facility] and expected to leave at 11AM - pending results of COVID test.    Communication to Patient/Family:  Met with patient and son - Manuel Hinojosa  (identified care giver) and they are agreeable to the transition plan. Communication to SNF/Rehab:  Bedside RN, Lian Garcia  has been notified to update the transition plan to the facility and call report (phone number [phone]. Discharge information has been updated on the AVS.     Discharge instructions to be fax'd to facility at [facility] # [phone]). Nursing Please include all hard scripts for controlled substances, med rec and dc summary, and AVS in packet. Reviewed and confirmed with facility, [facility] [place] can manage the patient care needs for the following:     Claretha Samples with (X) only those applicable:    Medication:  [x]  Medications will be available at the facility  []  IV Antibiotics   [x]  Controlled Substance - hard copy to be sent with patient   []  Weekly Labs   Documents:  [] Hard RX  [x] MAR  [x] Kardex  [x] AVS  []Transfer Summary  [x]Discharge   Equipment:  []  CPAP/BiPAP  []  Wound Vacuum  []  Ny or Urinary Device  []  PICC/Central Line  []  Nebulizer  []  Ventilator   Treatment:  []Isolation (for MRSA, VRE, etc.)  []Surgical Drain Management  []Tracheostomy Care  []Dressing Changes  []Dialysis with transportation and chair time   []PEG Care  []Oxygen  []Daily Weights for Heart Failure   Dietary:  []Any diet limitations  []Tube Feedings   []Total Parenteral Management (TPN)   Eligible for Medicaid Long Term Services and Supports  Yes:  [x] Eligible for medical assistance or will become eligible within 180 days and UAI completed. [] Provider/Patient and/or support system has requested screening.   [] UAI copy provided to patient or responsible party, .  [] UAI unavailable at discharge will send once processed to SNF provider. [] UAI unavailable at discharged mailed to patient  No:   [] Private pay and is not financially eligible for Medicaid within the next 180 days. [] Reside out-of-state.   [] A residents of a state owned/operated facility that is licensed  by [facility] and Developmental Services or [employer]  [] Enrollment in [facility] hospice services  [] [address] [place] [address]  [] Patient /Family declines to have screening completed or provide financial information for screening     Financial Resources:  Medicaid    [] Initiated and application pending   [] Full coverage     Advanced Care Plan:  []Surrogate Decision Maker of Care  []POA  []Communicated Code Status FULL CODE STATUS  (DDNR\", \"Full\")    Other     Raeann Birmingham RN, BSN, [address] [place] [address]    Coordinator  [phone]

## 2021-02-01 NOTE — PROGRESS NOTES
Problem: Falls - Risk of  Goal: *Absence of Falls  Description: Document Lear Shaker Fall Risk and appropriate interventions in the flowsheet. Outcome: Progressing Towards Goal  Note: Fall Risk Interventions:  Mobility Interventions: Communicate number of staff needed for ambulation/transfer         Medication Interventions: Teach patient to arise slowly, Patient to call before getting OOB    Elimination Interventions: Call light in reach    History of Falls Interventions: Bed/chair exit alarm         Problem: Patient Education: Go to Patient Education Activity  Goal: Patient/Family Education  Outcome: Progressing Towards Goal     Problem: Patient Education: Go to Patient Education Activity  Goal: Patient/Family Education  Outcome: Progressing Towards Goal     Problem: Patient Education: Go to Patient Education Activity  Goal: Patient/Family Education  Outcome: Progressing Towards Goal     Problem: Pressure Injury - Risk of  Goal: *Prevention of pressure injury  Description: Document Cordell Scale and appropriate interventions in the flowsheet.   Outcome: Progressing Towards Goal  Note: Pressure Injury Interventions:  Sensory Interventions: Assess changes in LOC, Check visual cues for pain, Minimize linen layers, Float heels, Pressure redistribution bed/mattress (bed type)    Moisture Interventions: Apply protective barrier, creams and emollients    Activity Interventions: PT/OT evaluation    Mobility Interventions: PT/OT evaluation    Nutrition Interventions: Document food/fluid/supplement intake    Friction and Shear Interventions: Minimize layers                Problem: Patient Education: Go to Patient Education Activity  Goal: Patient/Family Education  Outcome: Progressing Towards Goal

## 2021-02-01 NOTE — PROGRESS NOTES
ANISHA PLAN: RUR 16%    1) SNF - FOC offered and patient's son assisting with choices - 1) [address] [place] [address],[address]; 2) [facility]  3) [facility]. - referrals sent via CC - son would like to plan for patient to return home after rehab.     2) Covid testing completed - 1/28/2021 - Rapid test negative    3) 2nd IM letter prior to discharge    4) Patient will require [facility] transport at discharge    ADDENDUM:  11:00am  Patient has been accepted by [facility] (bed available 2/2) and [facility] (unsure of bed date) - insurance auth waived by [address] [place] [address] for SNF thru 2/28 - CM submitted initial clinicals - Ref #[id number]      Rock Ennis RN, BSN, [address] [place] [address]    Coordinator  [phone]

## 2021-02-01 NOTE — PROGRESS NOTES
0700 shift change report given to Rohini Nora (oncoming nurse) by nightshift (offgoing nurse). Report included the following information SBAR, Kardex and Intake/Output.      1554 rapid COVID sent for SNF

## 2021-02-01 NOTE — PROGRESS NOTES
I reviewed pertinent labs and imaging, and discussed /agreed on the plan of care with Dr. Stormy Sanchez      Hospitalist Progress Note    NAME: Giovanna Salcedo   :  1934   MRN:  [id number]       Assessment / Plan:   Generalized weakness- improved   - Patient remains weak. - Approx 20 mm drop in systolic BP from lying to standing without significant symptoms.   - Patient is now willing to consider rehab, CM notified to provide with list of options.  - PT/OT recc 5 days / week for Therapy   - 2nd admission for weakness refuses to consider [facility]   - CM for SNF placement Covid test ordered       CAD  HTN  H/O symptomatic hypotension  - Adequate BP control.  - Continue amlodipine 5 mg po daily. - CAD history unclear, patient was not on asa PTA - given anemia will not start at this time.     Anemia   Hgb 13.8 1/6 noted 10.7 on this admission   EGD 21:  OROPHARYNX:  Cords and pyriform recesses normal.   ESOPHAGUS:  The esophagus is normal.  The proximal, mid, and distal portions are normal.  The Z-Line is intact. STOMACH:  The fundus on antegrade and retroflex views is normal.  The body, antrum, and pylorus have patchy erythema, and focal erosions with scant acid hematin present. DUODENUM:  The bulb, second and third portions are normal.  - H/H remains stable, 9.9 today    - Continue pantoprazole 40 mg po daily. - Continue iron sucrose 200 mg IV daily, stop after 5 doses (2/2)  - Spoke with GI and no plans at this time for colonoscopy. - Continue to monitor blood count. - start Iron supplement at DC      CKD stage III, baseline Cr 1.2-1.3  - Cr. 1.23 pt at baseline   - Continue to avoid nephrotoxins.  - Monitor.          Pt scheduled to DC in am . Son made aware      / Body mass index is 24.9 kg/m². Code status: Full  Prophylaxis: Lovenox  Recommended Disposition: SNF/LTC     Subjective:     Chief Complaint / Reason for Physician Visit  \"I am not going to an assisted living \".   Discussed with RN events overnight. Discussed plan for SNF encouraged pt to discuss with son caregivers. Review of Systems:  Symptom Y/N Comments  Symptom Y/N Comments   Fever/Chills n   Chest Pain n    Poor Appetite n   Edema n    Cough n   Abdominal Pain n    Sputum n   Joint Pain     SOB/AGUILERA n   Pruritis/Rash n    Nausea/vomit n   Tolerating PT/OT n    Diarrhea n   Tolerating Diet     Constipation    Other       Could NOT obtain due to:      Objective:     VITALS:   Last 24hrs VS reviewed since prior progress note. Most recent are:  Patient Vitals for the past 24 hrs:   Temp Pulse Resp BP SpO2   02/01/21 0730 98.3 °F (36.8 °C) 73 16 (!) 115/58 95 %   02/01/21 0405 98.2 °F (36.8 °C) 85 16 (!) 118/57 94 %   01/31/21 2346 98.2 °F (36.8 °C) 86 16 138/65 94 %   01/31/21 1822 98.2 °F (36.8 °C) 73 16 131/60 99 %   01/31/21 1550 98.4 °F (36.9 °C) 70 16 (!) 120/58 97 %   01/31/21 1118 98.1 °F (36.7 °C) 71 16 (!) 120/59 96 %   01/31/21 0913  78  125/61        Intake/Output Summary (Last 24 hours) at 2/1/2021 6745  Last data filed at 1/31/2021 1315  Gross per 24 hour   Intake    Output 550 ml   Net -550 ml        I had a face to face encounter and independently examined this patient on 2/1/2021, as outlined below:  PHYSICAL EXAM:  General: WD, WN. Alert, cooperative, no acute distress    EENT:  EOMI. Anicteric sclerae. MMM [place]   Resp:  , no wheezing or rales. No accessory muscle use  CV:  Regular  rhythm,  No edema  GI:  Soft, Non distended, Non tender. +Bowel sounds  Neurologic:  Alert and oriented X 3, normal speech,   Psych:   t. Not anxious nor agitated  Skin:  No rashes.   No jaundice W  /D/I     Reviewed most current lab test results and cultures  YES  Reviewed most current radiology test results   YES  Review and summation of old records today    NO  Reviewed patient's current orders and MAR    YES  PMH/SH reviewed - no change compared to H&P  ________________________________________________________________________  Care Plan discussed with:    Comments   Patient x    Family      RN x    Care Manager     Consultant                        Multidiciplinary team rounds were held today with , nursing, pharmacist and clinical coordinator. Patient's plan of care was discussed; medications were reviewed and discharge planning was addressed. ________________________________________________________________________  Total NON critical care TIME: 30   Minutes    Total CRITICAL CARE TIME Spent:   Minutes non procedure based      Comments   >50% of visit spent in counseling and coordination of care     ________________________________________________________________________  Saji Byrne, NP     Procedures: see electronic medical records for all procedures/Xrays and details which were not copied into this note but were reviewed prior to creation of Plan. LABS:  I reviewed today's most current labs and imaging studies. Pertinent labs include:  Recent Labs     02/01/21 0525 01/31/21  1052 01/30/21  0409   WBC  --  7.6 5.9   HGB 9.9* 10.7* 10.3*   HCT 31.2* 33.7* 32.3*   PLT  --  380 345     Recent Labs     02/01/21 0525 01/31/21  1052 01/30/21  0409    139 138   K 4.0 4.1 4.0   * 107 107   CO2 26 28 26   GLU 85 74 90   BUN 25* 23* 21*   CREA 1.23* 1.27* 1.04*   CA 9.6 9.8 9.9   MG  --   --  1.9       Signed: Yolette Byrne, NP

## 2021-02-01 NOTE — PROGRESS NOTES
Problem: Mobility Impaired (Adult and Pediatric)  Goal: *Acute Goals and Plan of Care (Insert Text)  Description: FUNCTIONAL STATUS PRIOR TO ADMISSION: Pt lives in a one story alone. She had just returned home from [facility] and had fall necessitating return to ER. She states that she has a caregiver 5hrs 3x/week that assists with IADLs and showers. She states she used a rollator. HOME SUPPORT PRIOR TO ADMISSION: The patient lived alone with limited support. Physical Therapy Goals  Initiated 1/27/2021  1. Patient will move from supine to sit and sit to supine , scoot up and down, and roll side to side in bed with independence within 7 day(s). 2.  Patient will transfer from bed to chair and chair to bed with supervision/set-up using the least restrictive device within 7 day(s). 3.  Patient will perform sit to stand with supervision/set-up within 7 day(s). 4.  Patient will ambulate with supervision/set-up for 100 feet with the least restrictive device within 7 day(s). 5.  Patient will ascend/descend 2 stairs with handrail(s) with minimal assistance/contact guard assist within 7 day(s). Outcome: Progressing Towards Goal   PHYSICAL THERAPY TREATMENT  Patient: Coreen Robb (89 y.o. 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 XXXA]  Hip pain [M25.559]  Physical deconditioning [R53.81]  Generalized weakness [R53.1]  HTN (hypertension) [I10]  CAD (coronary artery disease) [I25.10]  Anemia [D64.9] <principal problem not specified>  Procedure(s) (LRB):  ESOPHAGOGASTRODUODENOSCOPY (EGD) (N/A)  ESOPHAGOGASTRODUODENAL (EGD) BIOPSY (N/A) 3 Days Post-Op  Precautions:    Chart, physical therapy assessment, plan of care and goals were reviewed. ASSESSMENT  Patient continues with skilled PT services and is progressing towards goals. Pt states she continues to feel weak but did not experience dizziness or drop in BP this session.  She is min to CGA with bed mobility and min A for shifting weight forward over feet with extra time. Pt amb with RW with min A for 10' and requires mod cues for improving upright posture, decreasing distance from walker, and completing full turn to chair. Pt tolerated 10 reps AROM BLE seated in chair marching, hip abd/add, knee ext and ankle pumps. Pt left in chair with LEs elevated, call bell in reach, nurse notified. Current Level of Function Impacting Discharge (mobility/balance): min to CGA overall, uses RW    Other factors to consider for discharge: would need 24 hr assist, fall risk, monitoring BP         PLAN :  Patient continues to benefit from skilled intervention to address the above impairments. Continue treatment per established plan of care. to address goals. Recommendation for discharge: (in order for the patient to meet his/her long term goals)  Therapy up to 5 days/week in SNF setting    This discharge recommendation:  Has been made in collaboration with the attending provider and/or case management    IF patient discharges home will need the following DME: rolling walker, BSC       SUBJECTIVE:   Patient stated I just feel weak.     OBJECTIVE DATA SUMMARY:   Critical Behavior:  Neurologic State: Alert  Orientation Level: Oriented X4  Cognition: Follows commands  Safety/Judgement: Home safety, Fall prevention, Awareness of environment  Functional Mobility Training:  Bed Mobility:  Rolling: Contact guard assistance; Additional time  Supine to Sit: Minimum assistance;Assist x1     Scooting: Minimum assistance        Transfers:  Sit to Stand: Minimum assistance;Assist x1;Additional time(aid in shifting weight forward over feet)  Stand to Sit: Contact guard assistance        Bed to Chair: Minimum assistance;Assist x1;Additional time                    Balance:  Sitting: Intact  Standing: Impaired  Standing - Static: Fair;Constant support; Other (comment)(RW)  Standing - Dynamic : Fair;Constant support; Other (comment)(RW)  Ambulation/Gait Training:  Distance (ft): 10 Feet (ft)  Assistive Device: Gait belt;Walker, rolling  Ambulation - Level of Assistance: Minimal assistance;Assist x1        Gait Abnormalities: Decreased step clearance; Other(flexed posture with increased proximity from walker)              Speed/Roseline: Slow;Pace decreased (<100 feet/min)  Step Length: Right shortened;Left shortened                 Pain Rating:  No c/o    Activity Tolerance:   Fair    After treatment patient left in no apparent distress:   Sitting in chair, Heels elevated for pressure relief, and Call bell within reach    COMMUNICATION/COLLABORATION:   The patients plan of care was discussed with: Registered nurse.      Shiv Ruffin, PT   Time Calculation: 25 mins

## 2021-02-01 NOTE — PROGRESS NOTES
ANISHA PLAN: 16% RUR    1) SNF - accepted and selected [address] [facility] [id number]    2) Covide Rapid Test pending     3) [facility] transport with [facility] transport set for 11AM on 2/2/2021. Care Management Interventions  PCP Verified by CM:  Yes  Mode of Transport at Discharge: BLS  Transition of Care Consult (CM Consult): SNF  Partner SNF: Yes  Discharge Durable Medical Equipment: (Pt has RW, rollator, and shower chair)  Physical Therapy Consult: Yes  Occupational Therapy Consult: Yes  Current Support Network: Lives Alone(Pt resides in an apartment alone )  Confirm Follow Up Transport: Family(Pt uses family vs Medicaid transport to her appts )  The Patient and/or Patient Representative was Provided with a Choice of Provider and Agrees with the Discharge Plan?: Yes  Name of the Patient Representative Who was Provided with a Choice of Provider and Agrees with the Discharge Plan: patient and son  Freedom of Choice List was Provided with Basic Dialogue that Supports the Patient's Individualized Plan of Care/Goals, Treatment Preferences and Shares the Quality Data Associated with the Providers?: Yes  Discharge Location  Discharge Placement: Skilled nursing facility([facility])    Marquez Epps RN, BSN, [address] [place] [address]    Coordinator  [phone]

## 2021-02-02 VITALS
OXYGEN SATURATION: 97 % | SYSTOLIC BLOOD PRESSURE: 111 MMHG | DIASTOLIC BLOOD PRESSURE: 58 MMHG | WEIGHT: 145.06 LBS | HEART RATE: 82 BPM | RESPIRATION RATE: 18 BRPM | BODY MASS INDEX: 24.77 KG/M2 | HEIGHT: 64 IN | TEMPERATURE: 98.2 F

## 2021-02-02 PROCEDURE — 74011250637 HC RX REV CODE- 250/637: Performed by: HOSPITALIST

## 2021-02-02 PROCEDURE — 74011250636 HC RX REV CODE- 250/636: Performed by: HOSPITALIST

## 2021-02-02 PROCEDURE — 74011250637 HC RX REV CODE- 250/637: Performed by: NURSE PRACTITIONER

## 2021-02-02 PROCEDURE — 74011250636 HC RX REV CODE- 250/636: Performed by: NURSE PRACTITIONER

## 2021-02-02 RX ADMIN — NYSTATIN: 100000 POWDER TOPICAL at 09:00

## 2021-02-02 RX ADMIN — AMLODIPINE BESYLATE 5 MG: 5 TABLET ORAL at 10:06

## 2021-02-02 RX ADMIN — IRON SUCROSE 200 MG: 20 INJECTION, SOLUTION INTRAVENOUS at 09:00

## 2021-02-02 RX ADMIN — ENOXAPARIN SODIUM 30 MG: 30 INJECTION SUBCUTANEOUS at 10:06

## 2021-02-02 RX ADMIN — Medication 5 ML: at 06:00

## 2021-02-02 RX ADMIN — Medication 2000 UNITS: at 10:06

## 2021-02-02 RX ADMIN — PANTOPRAZOLE SODIUM 40 MG: 40 TABLET, DELAYED RELEASE ORAL at 10:06

## 2021-02-02 NOTE — ROUTINE PROCESS
Bedside and Verbal shift change report given to Krista Lozano RN (oncoming nurse) by Laina Pro RN (offgoing nurse). Report included the following information SBAR, Kardex and Quality Measures.

## 2021-02-02 NOTE — PROGRESS NOTES
End of Shift Note    Bedside shift change report given to Manjinder Malone RN(oncoming nurse) by Cb Hu RN (offgoing nurse). Report included the following information SBAR, Kardex, MAR and Recent Results    Shift worked:  night     Shift summary and any significant changes:     none     Concerns for physician to address:  none     Zone phone for oncoming shift:   [phone]       Activity:  Activity Level: Bed Rest, Chair  Number times ambulated in hallways past shift: 0  Number of times OOB to chair past shift: 0    Cardiac:   Cardiac Monitoring: No      Cardiac Rhythm  Access:   Current line(s): PIV     Genitourinary:   Urinary status: voiding/purewick    Respiratory:   O2 Device: Room air  Chronic home O2 use?: NO  Incentive spirometer at bedside: NO     GI:  Last Bowel Movement Date: 01/31/21  Current diet:  DIET ONE TIME MESSAGE  DIET ONE TIME MESSAGE  DIET ONE TIME MESSAGE  DIET ONE TIME MESSAGE  DIET REGULAR  Passing flatus: YES  Tolerating current diet: YES  % Diet Eaten: 45 %    Pain Management:   Patient states pain is manageable on current regimen: N/A    Skin:  Cordell Score: 19  Interventions: increase time out of bed and internal/external urinary devices    Patient Safety:  Fall Score:  Total Score: 4  Interventions: bed/chair alarm, assistive device (walker, cane, etc), gripper socks and pt to call before getting OOB  High Fall Risk: Yes    Length of Stay:  Expected LOS: 2d 14h  Actual LOS: [address], JADE

## 2021-02-02 NOTE — PROGRESS NOTES
Patient's Rapid Covid Test is negative. Patient and her son notified of discharge today and are in agreement with discharge plan. [facility] has requested to  patient at 10AM this morning and [facility] is in agreement with early pick-up. Nursing updated of time change and magnet on door.     Boby Ray, RN, BSN, [facility]    Coordinator  [phone]

## 2021-02-02 NOTE — PROGRESS NOTES
TRANSFER - OUT REPORT:    Verbal report given to JADE Delgadillo(name) on Clifford Alexis  being transferred to [facility]) for routine progression of care       Report consisted of patients Situation, Background, Assessment and   Recommendations(SBAR). Information from the following report(s) SBAR, Kardex, ED Summary, Procedure Summary, Intake/Output, MAR, Recent Results and Med Rec Status was reviewed with the receiving nurse. Lines:       Opportunity for questions and clarification was provided.       Patient transported with:   Monitor

## 2021-02-02 NOTE — PROGRESS NOTES
Problem: Falls - Risk of  Goal: *Absence of Falls  Description: Document Shaunna Serrato Fall Risk and appropriate interventions in the flowsheet. Outcome: Resolved/Met  Note: Fall Risk Interventions:  Mobility Interventions: Bed/chair exit alarm, Communicate number of staff needed for ambulation/transfer         Medication Interventions: Bed/chair exit alarm, Patient to call before getting OOB    Elimination Interventions: Bed/chair exit alarm, Call light in reach    History of Falls Interventions: Bed/chair exit alarm         Problem: Patient Education: Go to Patient Education Activity  Goal: Patient/Family Education  Outcome: Resolved/Met     Problem: Patient Education: Go to Patient Education Activity  Goal: Patient/Family Education  Outcome: Resolved/Met     Problem: Patient Education: Go to Patient Education Activity  Goal: Patient/Family Education  Outcome: Resolved/Met     Problem: Pressure Injury - Risk of  Goal: *Prevention of pressure injury  Description: Document Cordell Scale and appropriate interventions in the flowsheet.   Outcome: Resolved/Met  Note: Pressure Injury Interventions:  Sensory Interventions: Assess changes in LOC, Check visual cues for pain, Minimize linen layers, Float heels, Pressure redistribution bed/mattress (bed type)    Moisture Interventions: Absorbent underpads, Apply protective barrier, creams and emollients    Activity Interventions: Increase time out of bed, PT/OT evaluation    Mobility Interventions: Float heels, PT/OT evaluation    Nutrition Interventions: Document food/fluid/supplement intake, Discuss nutritional consult with provider    Friction and Shear Interventions: Minimize layers                Problem: Patient Education: Go to Patient Education Activity  Goal: Patient/Family Education  Outcome: Resolved/Met     Problem: Patient Education: Go to Patient Education Activity  Goal: Patient/Family Education  Outcome: Resolved/Met

## 2021-02-10 ENCOUNTER — PATIENT OUTREACH (OUTPATIENT)
Dept: CASE MANAGEMENT | Age: 86
End: 2021-02-10

## 2021-02-10 NOTE — DISCHARGE SUMMARY
.                    Hospitalist Discharge Summary     Patient ID:  Lake Howell  [id number]  80 y.o.  1934  1/25/2021    PCP on record: Socorro Nelson MD    Admit date: 1/25/2021  Discharge date and time: 2/9/2021    DISCHARGE DIAGNOSIS:    Active Hospital Problems    Diagnosis Date Noted    HTN (hypertension) 02/01/2021    Anemia 02/01/2021    Physical deconditioning 01/27/2021    Hip pain 01/26/2021    Fall 01/26/2021    General weakness 01/26/2021    Orthostatic hypertension 01/26/2021    CAD (coronary artery disease) 01/01/2021    Generalized weakness 12/29/2020       CONSULTATIONS:  IP CONSULT TO GASTROENTEROLOGY    Excerpted HPI from H&P of [place] Gaytan MD:  CHIEF COMPLAINT:  weakness         HISTORY OF PRESENT ILLNESS:     Deneen Kim is a 80 y.o.   female who presentsto ED via EMS c/o falling and weakness in lower extremities. Pt is AOX4 and monitored X3. Pt reports that she was released from a rehab facility yesterday. Pt states \"my legs just felt like rubber and then I went down. she presented with similar complaints recently 12/29 and was discharged 1/7 to rehab. See discharge summary for detail below. At that time, pt received IVF , D/C her losartan ,PT/OT and then discharged to rehab . Denies Fever chills rigors sob CP, Focal weakness. We were asked to admit for work up and evaluation of the above problems.        ______________________________________________________________________  DISCHARGE SUMMARY/HOSPITAL COURSE:  for full details see H&P, daily progress notes, labs, consult notes. Daniel Appl y.o. Female was admitted to [address] [place] [address] on 1/25/2021 and treated for the following medical complaints:     Generalized weakness- improved   - Patient remains weak.   - Approx 20 mm drop in systolic BP from lying to standing without significant symptoms.   - Patient is now willing to consider rehab, CM notified to provide with list of options.  - PT/OT recc 5 days / week for Therapy   - 2nd admission for weakness refuses to consider [facility]   - CM for SNF placement Covid test ordered       CAD  HTN  H/O symptomatic hypotension- stable   - Adequate BP control.  - Continue amlodipine 5 mg po daily. - CAD history unclear, patient was not on asa PTA - given anemia will not start at this time.     Anemia - stable   Hgb 13.8 1/6 noted 10.7 on this admission   EGD 01/29/21:  OROPHARYNX:  Cords and pyriform recesses normal.   ESOPHAGUS:  The esophagus is normal.  The proximal, mid, and distal portions are normal.  The Z-Line is intact. STOMACH:  The fundus on antegrade and retroflex views is normal.  The body, antrum, and pylorus have patchy erythema, and focal erosions with scant acid hematin present. DUODENUM:  The bulb, second and third portions are normal.  -   H/H remains stable, 9.9 today    - Continue pantoprazole 40 mg po daily.    - Continue iron sucrose 200 mg IV daily, stop after 5 doses (2/2)  - Spoke with GI and no plans at this time for colonoscopy.   - Continue to monitor blood count.    - start Iron supplement at DC      CKD stage III, baseline Cr 1.2-1.3- stable  - Cr. 1.23 pt at baseline   - Continue to avoid nephrotoxins.  - Monitor.               _______________________________________________________________________  Patient seen and examined by me on discharge day. Pertinent Findings:  Gen:    Not in distress  Chest: Clear lungs  CVS:   Regular rhythm. No edema  Abd:  Soft, not distended, not tender  Neuro:  Alert, Oriented x3  _______________________________________________________________________  DISCHARGE MEDICATIONS:   Discharge Medication List as of 2/2/2021 10:29 AM      START taking these medications    Details   nystatin (MYCOSTATIN) powder Apply  to affected area two (2) times a day for 10 days. Abdominal folds, No Print, Disp-1 Bottle, R-0      pantoprazole (PROTONIX) 40 mg tablet Take 1 Tab by mouth Daily (before breakfast) for 30 days.  Indications: gastroesophageal reflux disease, No Print, Disp-30 Tab, R-0      polyethylene glycol (MIRALAX) 17 gram packet Take 1 Packet by mouth daily for 30 days. Indications: constipation, No Print, Disp-30 Packet, R-0      ferrous gluconate 324 mg (37.5 mg iron) tablet Take 1 Tab by mouth Daily (before breakfast) for 30 days. , No Print, Disp-30 Tab, R-0         CONTINUE these medications which have NOT CHANGED    Details   amLODIPine (NORVASC) 5 mg tablet Take 1 Tab by mouth daily. , Normal, Disp-30 Tab, R-0      cholecalciferol (VITAMIN D3) (1000 Units /25 mcg) tablet Take 2 Tabs by mouth daily. , Normal, Disp-30 Tab, R-0         STOP taking these medications       dexAMETHasone (DECADRON) 4 mg tablet Comments:   Reason for Stopping:                 Patient Follow Up Instructions: Activity: Activity as tolerated  Diet: Regular Diet  Wound Care: None needed    Follow-up with PCP in 1 week. Follow-up tests/labs     Follow-up Information     Follow up With Specialties Details Why Katie ESPINOZA [facility] Maurisio Barton [address]    [facility], [address]   [address] [facility]  [phone]          ________________________________________________________________    Risk of deterioration: Moderate    Condition at Discharge:  Stable  __________________________________________________________________    Disposition  SNF/LTC    ____________________________________________________________________    Code Status: Full Code  ___________________________________________________________________      Total time in minutes spent coordinating this discharge (includes going over instructions, follow-up, prescriptions, and preparing report for sign off to her PCP) :  35 minutes    Signed:  Yolette Giraldo NP

## 2021-02-13 NOTE — PROGRESS NOTES
Post Acute Facility Update     *The information contained in this note was received during a weekly care coordination call with the post acute facility*    Current Facility: [address] (SNF)  Anticipated Discharge Date: TBD    Facility Nursing Update: No current updates  Facility Social Work Update: no current updates  ADL's: Contact Guard Assist - hands on patient for balance   Current Level of Assistance: Contact Guard Assist - hands on patient for balance   Bed Mobility: Minimal Assistance   Transfers: Minimal Assistance   Ambulation: Minimal Assistance   How far (in feet) is the patient ambulating?  50  Does patient use an assistive device: yes  If yes, device used: Walker  Barriers to Discharge: TBD    Adriana Villalta

## 2021-02-17 ENCOUNTER — PATIENT OUTREACH (OUTPATIENT)
Dept: CASE MANAGEMENT | Age: 86
End: 2021-02-17

## 2021-02-24 ENCOUNTER — PATIENT OUTREACH (OUTPATIENT)
Dept: CASE MANAGEMENT | Age: 86
End: 2021-02-24

## 2021-02-27 NOTE — PROGRESS NOTES
[address] [place] [address] Dr Discharge Note    *The information contained in this note was received during a weekly care coordination call with the post acute facility*      Patient was discharged from [address] [place] [address] ([facility]) on 2/19/2021. Maggie Longoria PCP: MD HERMINIO Suggs appointment: Family to scheduled PCP follow up appointment    Home Health/Outpatient orders at discharge: PT, OT and [address]: Ann[facility] at [facility] ordered at discharge: None  [facility] [address] received prior to discharge: [address] [facility] Team will sign off at this time. Medications were not reconciled and general patient assessment was not completed during this skilled nursing facility outreach.      Coreen GARRETTN, RN

## 2021-02-27 NOTE — PROGRESS NOTES
Post Acute Facility Update     *The information contained in this note was received during a weekly care coordination call with the post acute facility*    Current Facility: [address] (SNF)  Anticipated Discharge Date: D    Facility Nursing Update: No current updates  Facility Social Work Update: No current updates  ADL's: Stand by Assist - Presence and Cueing  Current Level of Assistance: Stand by Assist - Presence and Cueing  Bed Mobility: Stand by Assist - Presence and Cueing  Transfers: Stand by Assist - Presence and Cueing  Ambulation: Stand by Assist - Presence and Cueing  How far (in feet) is the patient ambulating?  40  Does patient use an assistive device: yes  If yes, device used: Walker  Barriers to Discharge: D    [facility]

## 2021-08-03 PROBLEM — I10 HTN (HYPERTENSION): Status: RESOLVED | Noted: 2021-02-01 | Resolved: 2021-08-03

## 2021-08-03 PROBLEM — I10 HYPERTENSION: Status: RESOLVED | Noted: 2021-01-01 | Resolved: 2021-08-03

## 2022-03-18 PROBLEM — I95.1 ORTHOSTATIC HYPOTENSION: Status: ACTIVE | Noted: 2020-12-31

## 2022-03-18 PROBLEM — I10 ORTHOSTATIC HYPERTENSION: Status: ACTIVE | Noted: 2021-01-26

## 2022-03-18 PROBLEM — M25.559 HIP PAIN: Status: ACTIVE | Noted: 2021-01-26

## 2022-03-18 PROBLEM — R53.1 GENERAL WEAKNESS: Status: ACTIVE | Noted: 2021-01-26
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
## 2022-03-19 PROBLEM — R53.81 PHYSICAL DECONDITIONING: Status: ACTIVE | Noted: 2021-01-27

## 2022-03-19 PROBLEM — I25.10 CAD (CORONARY ARTERY DISEASE): Status: ACTIVE | Noted: 2021-01-01

## 2022-03-19 PROBLEM — R53.1 GENERALIZED WEAKNESS: Status: ACTIVE | Noted: 2020-12-29

## 2022-03-19 PROBLEM — D64.9 ANEMIA: Status: ACTIVE | Noted: 2021-02-01

## 2023-05-19 RX ORDER — AMLODIPINE BESYLATE 5 MG/1
5 TABLET ORAL DAILY
COMMUNITY
Start: 2021-01-07

## (undated) DEVICE — CONTAINER SPEC 20 ML LID NEUT BUFF FORMALIN 10 % POLYPR STS

## (undated) DEVICE — SET ADMIN 16ML TBNG L100IN 2 Y INJ SITE IV PIGGY BK DISP

## (undated) DEVICE — BLOCK BITE ENDOSCP AD 21 MM W/ DIL BLU LF DISP

## (undated) DEVICE — Device

## (undated) DEVICE — NEONATAL-ADULT SPO2 SENSOR: Brand: NELLCOR

## (undated) DEVICE — Z DISCONTINUED PER MEDLINE LINE GAS SAMPLING O2/CO2 LNG AD 13 FT NSL W/ TBNG FILTERLINE

## (undated) DEVICE — NEEDLE HYPO 18GA L1.5IN PNK S STL HUB POLYPR SHLD REG BVL

## (undated) DEVICE — TOWEL 4 PLY TISS 19X30 SUE WHT

## (undated) DEVICE — SYR 3ML LL TIP 1/10ML GRAD --

## (undated) DEVICE — BAG SPEC BIOHZRD 10 X 10 IN --

## (undated) DEVICE — FORCEPS BX L160CM DIA8MM GRSP DISECT CUP TIP NONLOCKING ROT

## (undated) DEVICE — BASIN EMSIS 16OZ GRAPHITE PLAS KID SHP MOLD GRAD FOR ORAL

## (undated) DEVICE — SOLIDIFIER FLD 2OZ 1500CC N DISINF IN BTL DISP SAFESORB

## (undated) DEVICE — YANKAUER,TAPERED BULBOUS TIP,W/O VENT: Brand: MEDLINE

## (undated) DEVICE — CATH IV AUTOGRD BC PNK 20GA 25 -- INSYTE

## (undated) DEVICE — 1200 GUARD II KIT W/5MM TUBE W/O VAC TUBE: Brand: GUARDIAN

## (undated) DEVICE — SYR 10ML LUER LOK 1/5ML GRAD --

## (undated) DEVICE — MEDI-VAC YANK SUCT HNDL W/TPRD BULBOUS TIP & NON-CONDUCTIVE: Brand: CARDINAL HEALTH

## (undated) DEVICE — ELECTRODE,RADIOTRANSLUCENT,FOAM,5PK: Brand: MEDLINE